# Patient Record
Sex: FEMALE | Race: WHITE | Employment: FULL TIME | URBAN - METROPOLITAN AREA
[De-identification: names, ages, dates, MRNs, and addresses within clinical notes are randomized per-mention and may not be internally consistent; named-entity substitution may affect disease eponyms.]

---

## 2017-01-06 RX ORDER — GABAPENTIN 300 MG/1
CAPSULE ORAL
Qty: 90 CAPSULE | Refills: 0 | OUTPATIENT
Start: 2017-01-06

## 2017-01-11 RX ORDER — GABAPENTIN 300 MG/1
CAPSULE ORAL
Qty: 90 CAPSULE | Refills: 0 | Status: SHIPPED | OUTPATIENT
Start: 2017-01-11 | End: 2017-02-07 | Stop reason: SDUPTHER

## 2017-01-13 RX ORDER — BUSPIRONE HYDROCHLORIDE 10 MG/1
TABLET ORAL
Qty: 30 TABLET | Refills: 0 | Status: SHIPPED | OUTPATIENT
Start: 2017-01-13 | End: 2017-02-15 | Stop reason: SDUPTHER

## 2017-01-23 RX ORDER — BUSPIRONE HYDROCHLORIDE 10 MG/1
TABLET ORAL
Qty: 30 TABLET | Refills: 0 | OUTPATIENT
Start: 2017-01-23

## 2017-02-07 RX ORDER — GABAPENTIN 300 MG/1
CAPSULE ORAL
Qty: 90 CAPSULE | Refills: 0 | Status: SHIPPED | OUTPATIENT
Start: 2017-02-07 | End: 2017-03-07 | Stop reason: SDUPTHER

## 2017-02-15 RX ORDER — BUSPIRONE HYDROCHLORIDE 10 MG/1
TABLET ORAL
Qty: 30 TABLET | Refills: 0 | Status: SHIPPED | OUTPATIENT
Start: 2017-02-15 | End: 2017-03-15

## 2017-02-17 RX ORDER — BUSPIRONE HYDROCHLORIDE 10 MG/1
TABLET ORAL
Qty: 30 TABLET | Refills: 0 | Status: SHIPPED | OUTPATIENT
Start: 2017-02-17 | End: 2017-03-03 | Stop reason: SDUPTHER

## 2017-03-03 RX ORDER — BUSPIRONE HYDROCHLORIDE 10 MG/1
TABLET ORAL
Qty: 30 TABLET | Refills: 0 | Status: SHIPPED | OUTPATIENT
Start: 2017-03-03 | End: 2017-03-14 | Stop reason: SDUPTHER

## 2017-03-07 RX ORDER — GABAPENTIN 300 MG/1
CAPSULE ORAL
Qty: 90 CAPSULE | Refills: 0 | Status: SHIPPED | OUTPATIENT
Start: 2017-03-07 | End: 2017-03-15 | Stop reason: SDUPTHER

## 2017-03-14 ENCOUNTER — HOSPITAL ENCOUNTER (OUTPATIENT)
Dept: WOMENS IMAGING | Age: 57
Discharge: HOME | End: 2017-03-14
Attending: INTERNAL MEDICINE | Admitting: INTERNAL MEDICINE

## 2017-03-14 DIAGNOSIS — Z12.31 VISIT FOR SCREENING MAMMOGRAM: ICD-10-CM

## 2017-03-14 RX ORDER — BUSPIRONE HYDROCHLORIDE 10 MG/1
TABLET ORAL
Qty: 30 TABLET | Refills: 0 | Status: SHIPPED | OUTPATIENT
Start: 2017-03-14 | End: 2017-03-22 | Stop reason: SDUPTHER

## 2017-03-15 ENCOUNTER — OFFICE VISIT (OUTPATIENT)
Dept: FAMILY MEDICINE CLINIC | Age: 57
End: 2017-03-15

## 2017-03-15 VITALS
HEIGHT: 66 IN | WEIGHT: 194 LBS | BODY MASS INDEX: 31.18 KG/M2 | TEMPERATURE: 98.1 F | SYSTOLIC BLOOD PRESSURE: 118 MMHG | DIASTOLIC BLOOD PRESSURE: 82 MMHG

## 2017-03-15 DIAGNOSIS — Z00.00 PHYSICAL EXAM, ANNUAL: Primary | ICD-10-CM

## 2017-03-15 DIAGNOSIS — Z13.220 SCREENING FOR LIPOID DISORDERS: ICD-10-CM

## 2017-03-15 DIAGNOSIS — Z13.1 SCREENING FOR DIABETES MELLITUS: ICD-10-CM

## 2017-03-15 PROCEDURE — 99396 PREV VISIT EST AGE 40-64: CPT | Performed by: INTERNAL MEDICINE

## 2017-03-15 RX ORDER — GABAPENTIN 300 MG/1
CAPSULE ORAL
Qty: 90 CAPSULE | Refills: 0 | Status: SHIPPED | OUTPATIENT
Start: 2017-03-15 | End: 2017-04-19 | Stop reason: SDUPTHER

## 2017-03-15 ASSESSMENT — ENCOUNTER SYMPTOMS
SHORTNESS OF BREATH: 0
NAUSEA: 0
APNEA: 0
SORE THROAT: 0
SINUS PRESSURE: 0
COUGH: 0
BLOOD IN STOOL: 0
WHEEZING: 0
RHINORRHEA: 0
CONSTIPATION: 0
ABDOMINAL PAIN: 0
DIARRHEA: 0
VOMITING: 0

## 2017-03-15 ASSESSMENT — PATIENT HEALTH QUESTIONNAIRE - PHQ9
SUM OF ALL RESPONSES TO PHQ QUESTIONS 1-9: 0
1. LITTLE INTEREST OR PLEASURE IN DOING THINGS: 0
2. FEELING DOWN, DEPRESSED OR HOPELESS: 0
SUM OF ALL RESPONSES TO PHQ9 QUESTIONS 1 & 2: 0

## 2017-03-22 RX ORDER — BUSPIRONE HYDROCHLORIDE 10 MG/1
TABLET ORAL
Qty: 30 TABLET | Refills: 0 | Status: SHIPPED | OUTPATIENT
Start: 2017-03-22 | End: 2017-03-29 | Stop reason: SDUPTHER

## 2017-03-27 DIAGNOSIS — Z13.1 SCREENING FOR DIABETES MELLITUS: ICD-10-CM

## 2017-03-27 DIAGNOSIS — Z13.220 SCREENING FOR LIPOID DISORDERS: ICD-10-CM

## 2017-03-27 LAB
CHOLESTEROL, TOTAL: 217 MG/DL (ref 0–199)
GLUCOSE BLD-MCNC: 102 MG/DL (ref 70–99)
HDLC SERPL-MCNC: 58 MG/DL (ref 40–60)
LDL CHOLESTEROL CALCULATED: 138 MG/DL
TRIGL SERPL-MCNC: 106 MG/DL (ref 0–150)
VLDLC SERPL CALC-MCNC: 21 MG/DL

## 2017-03-29 RX ORDER — BUSPIRONE HYDROCHLORIDE 10 MG/1
TABLET ORAL
Qty: 30 TABLET | Refills: 0 | Status: SHIPPED | OUTPATIENT
Start: 2017-03-29 | End: 2017-04-07 | Stop reason: SDUPTHER

## 2017-04-07 RX ORDER — BUSPIRONE HYDROCHLORIDE 10 MG/1
TABLET ORAL
Qty: 30 TABLET | Refills: 0 | Status: SHIPPED | OUTPATIENT
Start: 2017-04-07 | End: 2017-04-14 | Stop reason: SDUPTHER

## 2017-04-17 RX ORDER — BUSPIRONE HYDROCHLORIDE 10 MG/1
TABLET ORAL
Qty: 30 TABLET | Refills: 0 | Status: SHIPPED | OUTPATIENT
Start: 2017-04-17 | End: 2017-04-19 | Stop reason: SDUPTHER

## 2017-04-18 RX ORDER — NAPROXEN 500 MG/1
TABLET ORAL
Qty: 20 TABLET | Refills: 0 | Status: SHIPPED | OUTPATIENT
Start: 2017-04-18 | End: 2017-04-19 | Stop reason: SDUPTHER

## 2017-04-19 RX ORDER — BUSPIRONE HYDROCHLORIDE 10 MG/1
TABLET ORAL
Qty: 30 TABLET | Refills: 1 | Status: SHIPPED | OUTPATIENT
Start: 2017-04-19 | End: 2017-07-03 | Stop reason: SDUPTHER

## 2017-04-19 RX ORDER — GABAPENTIN 300 MG/1
CAPSULE ORAL
Qty: 90 CAPSULE | Refills: 0 | Status: SHIPPED | OUTPATIENT
Start: 2017-04-19 | End: 2017-06-16 | Stop reason: SDUPTHER

## 2017-04-19 RX ORDER — NAPROXEN 500 MG/1
TABLET ORAL
Qty: 20 TABLET | Refills: 0 | Status: SHIPPED | OUTPATIENT
Start: 2017-04-19 | End: 2017-09-11 | Stop reason: SDUPTHER

## 2017-06-16 RX ORDER — GABAPENTIN 300 MG/1
CAPSULE ORAL
Qty: 90 CAPSULE | Refills: 0 | Status: SHIPPED | OUTPATIENT
Start: 2017-06-16 | End: 2017-07-28 | Stop reason: SDUPTHER

## 2017-06-27 ENCOUNTER — OFFICE VISIT (OUTPATIENT)
Dept: FAMILY MEDICINE CLINIC | Age: 57
End: 2017-06-27

## 2017-06-27 VITALS
BODY MASS INDEX: 30.89 KG/M2 | SYSTOLIC BLOOD PRESSURE: 134 MMHG | WEIGHT: 192.2 LBS | DIASTOLIC BLOOD PRESSURE: 80 MMHG | HEIGHT: 66 IN | TEMPERATURE: 98.1 F

## 2017-06-27 DIAGNOSIS — J01.10 ACUTE NON-RECURRENT FRONTAL SINUSITIS: Primary | ICD-10-CM

## 2017-06-27 PROCEDURE — 99213 OFFICE O/P EST LOW 20 MIN: CPT | Performed by: INTERNAL MEDICINE

## 2017-06-27 RX ORDER — CIPROFLOXACIN 500 MG/1
500 TABLET, FILM COATED ORAL 2 TIMES DAILY
Qty: 20 TABLET | Refills: 0 | Status: SHIPPED | OUTPATIENT
Start: 2017-06-27 | End: 2017-07-07

## 2017-06-27 ASSESSMENT — ENCOUNTER SYMPTOMS
SHORTNESS OF BREATH: 0
APNEA: 0
ABDOMINAL PAIN: 0
COUGH: 1

## 2017-07-03 RX ORDER — BUSPIRONE HYDROCHLORIDE 10 MG/1
TABLET ORAL
Qty: 30 TABLET | Refills: 0 | Status: SHIPPED | OUTPATIENT
Start: 2017-07-03 | End: 2017-07-12 | Stop reason: SDUPTHER

## 2017-07-12 RX ORDER — BUSPIRONE HYDROCHLORIDE 10 MG/1
TABLET ORAL
Qty: 30 TABLET | Refills: 0 | Status: SHIPPED | OUTPATIENT
Start: 2017-07-12 | End: 2017-07-19 | Stop reason: SDUPTHER

## 2017-07-19 RX ORDER — BUSPIRONE HYDROCHLORIDE 10 MG/1
TABLET ORAL
Qty: 30 TABLET | Refills: 0 | Status: SHIPPED | OUTPATIENT
Start: 2017-07-19 | End: 2017-07-28 | Stop reason: SDUPTHER

## 2017-07-24 RX ORDER — GABAPENTIN 300 MG/1
CAPSULE ORAL
Qty: 90 CAPSULE | Status: CANCELLED | OUTPATIENT
Start: 2017-07-24

## 2017-07-28 ENCOUNTER — TELEPHONE (OUTPATIENT)
Dept: FAMILY MEDICINE CLINIC | Age: 57
End: 2017-07-28

## 2017-07-28 RX ORDER — BUSPIRONE HYDROCHLORIDE 10 MG/1
TABLET ORAL
Qty: 30 TABLET | Refills: 0 | Status: SHIPPED | OUTPATIENT
Start: 2017-07-28 | End: 2017-08-03 | Stop reason: SDUPTHER

## 2017-07-28 RX ORDER — GABAPENTIN 300 MG/1
CAPSULE ORAL
Qty: 90 CAPSULE | Refills: 1 | Status: SHIPPED | OUTPATIENT
Start: 2017-07-28 | End: 2017-09-22 | Stop reason: SDUPTHER

## 2017-08-04 RX ORDER — BUSPIRONE HYDROCHLORIDE 10 MG/1
TABLET ORAL
Qty: 30 TABLET | Refills: 0 | Status: SHIPPED | OUTPATIENT
Start: 2017-08-04 | End: 2017-08-11 | Stop reason: SDUPTHER

## 2017-08-11 RX ORDER — BUSPIRONE HYDROCHLORIDE 10 MG/1
TABLET ORAL
Qty: 30 TABLET | Refills: 0 | Status: SHIPPED | OUTPATIENT
Start: 2017-08-11 | End: 2017-08-21 | Stop reason: SDUPTHER

## 2017-08-21 RX ORDER — BUSPIRONE HYDROCHLORIDE 10 MG/1
TABLET ORAL
Qty: 30 TABLET | Refills: 0 | Status: SHIPPED | OUTPATIENT
Start: 2017-08-21 | End: 2017-08-29 | Stop reason: SDUPTHER

## 2017-08-29 RX ORDER — BUSPIRONE HYDROCHLORIDE 10 MG/1
TABLET ORAL
Qty: 30 TABLET | Refills: 0 | Status: SHIPPED | OUTPATIENT
Start: 2017-08-29 | End: 2017-09-11 | Stop reason: SDUPTHER

## 2017-09-11 RX ORDER — BUSPIRONE HYDROCHLORIDE 10 MG/1
TABLET ORAL
Qty: 30 TABLET | Refills: 0 | Status: SHIPPED | OUTPATIENT
Start: 2017-09-11 | End: 2018-03-07 | Stop reason: ALTCHOICE

## 2017-09-11 RX ORDER — BUSPIRONE HYDROCHLORIDE 10 MG/1
TABLET ORAL
Qty: 30 TABLET | Refills: 3 | Status: SHIPPED | OUTPATIENT
Start: 2017-09-11 | End: 2017-10-20 | Stop reason: SDUPTHER

## 2017-09-12 RX ORDER — NAPROXEN 500 MG/1
TABLET ORAL
Qty: 20 TABLET | Refills: 0 | Status: SHIPPED | OUTPATIENT
Start: 2017-09-12 | End: 2018-03-07 | Stop reason: SDUPTHER

## 2017-09-22 RX ORDER — GABAPENTIN 300 MG/1
CAPSULE ORAL
Qty: 90 CAPSULE | Refills: 0 | Status: SHIPPED | OUTPATIENT
Start: 2017-09-22 | End: 2019-12-20 | Stop reason: SDUPTHER

## 2017-09-25 RX ORDER — GABAPENTIN 300 MG/1
CAPSULE ORAL
Qty: 90 CAPSULE | Refills: 0 | Status: SHIPPED | OUTPATIENT
Start: 2017-09-25 | End: 2017-11-21 | Stop reason: SDUPTHER

## 2017-10-04 ENCOUNTER — TELEPHONE (OUTPATIENT)
Dept: FAMILY MEDICINE CLINIC | Age: 57
End: 2017-10-04

## 2017-10-04 RX ORDER — PERMETHRIN 50 MG/G
CREAM TOPICAL
Qty: 1 TUBE | Refills: 0 | Status: SHIPPED | OUTPATIENT
Start: 2017-10-04 | End: 2018-03-07 | Stop reason: ALTCHOICE

## 2017-10-04 NOTE — TELEPHONE ENCOUNTER
Pt knows for sure it is scabies, exposed at work 2 weeks ago and work is being treated. Pt stated that she has a small batch on her back, it itches and burns and getting intense.     Pl advise   [phone]

## 2017-10-20 RX ORDER — BUSPIRONE HYDROCHLORIDE 10 MG/1
TABLET ORAL
Qty: 30 TABLET | Refills: 1 | Status: SHIPPED | OUTPATIENT
Start: 2017-10-20 | End: 2017-11-08 | Stop reason: SDUPTHER

## 2017-10-20 RX ORDER — NAPROXEN 500 MG/1
TABLET ORAL
Qty: 60 TABLET | Refills: 1 | Status: SHIPPED | OUTPATIENT
Start: 2017-10-20 | End: 2018-03-07 | Stop reason: SDUPTHER

## 2017-11-08 RX ORDER — BUSPIRONE HYDROCHLORIDE 10 MG/1
TABLET ORAL
Qty: 30 TABLET | Refills: 0 | Status: SHIPPED | OUTPATIENT
Start: 2017-11-08 | End: 2017-11-18 | Stop reason: SDUPTHER

## 2017-11-20 RX ORDER — BUSPIRONE HYDROCHLORIDE 10 MG/1
TABLET ORAL
Qty: 30 TABLET | Refills: 0 | Status: SHIPPED | OUTPATIENT
Start: 2017-11-20 | End: 2017-11-27 | Stop reason: SDUPTHER

## 2017-11-21 RX ORDER — GABAPENTIN 300 MG/1
CAPSULE ORAL
Qty: 90 CAPSULE | Refills: 0 | Status: SHIPPED | OUTPATIENT
Start: 2017-11-21 | End: 2017-12-21 | Stop reason: SDUPTHER

## 2017-11-27 RX ORDER — BUSPIRONE HYDROCHLORIDE 10 MG/1
TABLET ORAL
Qty: 30 TABLET | Refills: 0 | Status: SHIPPED | OUTPATIENT
Start: 2017-11-27 | End: 2018-03-07 | Stop reason: ALTCHOICE

## 2017-12-22 RX ORDER — GABAPENTIN 300 MG/1
CAPSULE ORAL
Qty: 90 CAPSULE | Refills: 0 | Status: SHIPPED | OUTPATIENT
Start: 2017-12-22 | End: 2018-01-19 | Stop reason: SDUPTHER

## 2018-01-19 RX ORDER — GABAPENTIN 300 MG/1
CAPSULE ORAL
Qty: 90 CAPSULE | Refills: 0 | Status: SHIPPED | OUTPATIENT
Start: 2018-01-19 | End: 2018-02-17 | Stop reason: SDUPTHER

## 2018-02-19 RX ORDER — GABAPENTIN 300 MG/1
CAPSULE ORAL
Qty: 90 CAPSULE | Refills: 0 | Status: SHIPPED | OUTPATIENT
Start: 2018-02-19 | End: 2018-03-07 | Stop reason: SDUPTHER

## 2018-03-07 ENCOUNTER — OFFICE VISIT (OUTPATIENT)
Dept: FAMILY MEDICINE CLINIC | Age: 58
End: 2018-03-07

## 2018-03-07 ENCOUNTER — HOSPITAL ENCOUNTER (OUTPATIENT)
Dept: NON INVASIVE DIAGNOSTICS | Age: 58
Discharge: HOME | End: 2018-03-07
Attending: INTERNAL MEDICINE | Admitting: INTERNAL MEDICINE

## 2018-03-07 VITALS
DIASTOLIC BLOOD PRESSURE: 74 MMHG | BODY MASS INDEX: 29.92 KG/M2 | SYSTOLIC BLOOD PRESSURE: 128 MMHG | HEIGHT: 66 IN | TEMPERATURE: 98.2 F | WEIGHT: 186.2 LBS

## 2018-03-07 DIAGNOSIS — M79.644 PAIN OF RIGHT THUMB: ICD-10-CM

## 2018-03-07 DIAGNOSIS — H10.021 PINK EYE DISEASE OF RIGHT EYE: ICD-10-CM

## 2018-03-07 PROCEDURE — 1036F TOBACCO NON-USER: CPT | Performed by: INTERNAL MEDICINE

## 2018-03-07 PROCEDURE — G8427 DOCREV CUR MEDS BY ELIG CLIN: HCPCS | Performed by: INTERNAL MEDICINE

## 2018-03-07 PROCEDURE — 3014F SCREEN MAMMO DOC REV: CPT | Performed by: INTERNAL MEDICINE

## 2018-03-07 PROCEDURE — G8484 FLU IMMUNIZE NO ADMIN: HCPCS | Performed by: INTERNAL MEDICINE

## 2018-03-07 PROCEDURE — G8417 CALC BMI ABV UP PARAM F/U: HCPCS | Performed by: INTERNAL MEDICINE

## 2018-03-07 PROCEDURE — 99213 OFFICE O/P EST LOW 20 MIN: CPT | Performed by: INTERNAL MEDICINE

## 2018-03-07 PROCEDURE — 3017F COLORECTAL CA SCREEN DOC REV: CPT | Performed by: INTERNAL MEDICINE

## 2018-03-07 RX ORDER — NAPROXEN 500 MG/1
TABLET ORAL
Qty: 20 TABLET | Refills: 3 | Status: SHIPPED | OUTPATIENT
Start: 2018-03-07 | End: 2018-12-18 | Stop reason: ALTCHOICE

## 2018-03-07 RX ORDER — MOXIFLOXACIN 5 MG/ML
1 SOLUTION OPHTHALMIC 2 TIMES DAILY
Qty: 3 ML | Refills: 0 | Status: SHIPPED | OUTPATIENT
Start: 2018-03-07 | End: 2018-12-18 | Stop reason: ALTCHOICE

## 2018-03-07 ASSESSMENT — ENCOUNTER SYMPTOMS
SINUS PAIN: 0
RHINORRHEA: 0
CONSTIPATION: 0
BLOOD IN STOOL: 0
SHORTNESS OF BREATH: 0
COUGH: 0

## 2018-03-07 NOTE — PROGRESS NOTES
MG tablet Take 25 mg by mouth nightly as needed. No current facility-administered medications for this visit. Allergies: Dye [iodides]; Cephalexin; Clindamycin/lincomycin cross reactors; Erythromycin; Nortriptyline; Paxil [paroxetine]; Pcn [penicillins]; Sertraline; Sulfa antibiotics; Tetracyclines & related; and Amoxicillin-pot clavulanate  Past Medical History:   Diagnosis Date    Allergic rhinitis     Anxiety disorder     Depression     HSV (herpes simplex virus) infection     IBS (irritable bowel syndrome)     SVT (supraventricular tachycardia) (HCC)     Stewart-Parkinson-White syndrome      Past Surgical History:   Procedure Laterality Date    APPENDECTOMY  07/05/1999    CHOLECYSTECTOMY      INCISIONAL HERNIA REPAIR  6-8-16    with mesh    PARTIAL HYSTERECTOMY  07/16/1999    TONSILLECTOMY       Family History   Problem Relation Age of Onset    Heart Disease Father     Diabetes Father     Cancer Mother      uterine    Kidney Disease Paternal Aunt      Social History   Substance Use Topics    Smoking status: Former Smoker     Quit date: 6/20/2008    Smokeless tobacco: Never Used    Alcohol use Yes      Comment: social use       Review of Systems   Constitutional: Negative for chills, diaphoresis and fatigue. HENT: Negative for congestion, postnasal drip, rhinorrhea and sinus pain. Eyes:        Patient presents with:  Conjunctivitis: patient c/o ? conjunctivitis- right eye x 3-4 days; redness, matting this morning and drainage  Finger Pain: patient c/o right thumb pain; ? arthritis. patient request Rx refill: Naprosyn     Respiratory: Negative for cough and shortness of breath. Cardiovascular: Negative for palpitations. Gastrointestinal: Negative for blood in stool and constipation. Genitourinary: Negative for dysuria and frequency.    Musculoskeletal:        Patient presents with:  Conjunctivitis: patient c/o ? conjunctivitis- right eye x 3-4 days; redness, matting this morning and drainage  Finger Pain: patient c/o right thumb pain; ? arthritis. patient request Rx refill: Naprosyn         Objective:   Physical Exam   Constitutional: She is oriented to person, place, and time. She appears well-developed and well-nourished. HENT:   Head: Normocephalic and atraumatic. Eyes:   [place] R   Cardiovascular: Normal rate. No murmur heard. Pulmonary/Chest: Effort normal and breath sounds normal. No respiratory distress. She has no wheezes. She has no rales. Abdominal: She exhibits no distension. There is no tenderness. There is no rebound. Neurological: She is alert and oriented to person, place, and time. Assessment:      1. Pain of right thumb  XR HAND RIGHT (2 VIEWS)   2. Pink eye disease of right eye           Plan:      Outpatient Encounter Prescriptions as of 3/7/2018   Medication Sig Dispense Refill    Moxifloxacin HCl (MOXEZA) 0.5 % SOLN Apply 1 drop to eye 2 times daily 3 mL 0    naproxen (NAPROSYN) 500 MG tablet TAKE ONE TABLET BY MOUTH TWICE A DAY WITH MEALS 20 tablet 3    gabapentin (NEURONTIN) 300 MG capsule TAKE ONE CAPSULE BY MOUTH THREE TIMES A DAY 90 capsule 0    omeprazole (PRILOSEC) 20 MG capsule Take 20 mg by mouth daily as needed       senna (SENOKOT) 8.6 MG tablet Take 1 tablet by mouth daily as needed       loratadine (CLARITIN) 10 MG tablet Take 10 mg by mouth daily as needed.  diphenhydrAMINE (BENADRYL) 25 MG tablet Take 25 mg by mouth nightly as needed.       [DISCONTINUED] gabapentin (NEURONTIN) 300 MG capsule TAKE ONE CAPSULE BY MOUTH THREE TIMES A DAY 90 capsule 0    [DISCONTINUED] busPIRone (BUSPAR) 10 MG tablet TAKE ONE TABLET BY MOUTH THREE TIMES A DAY 30 tablet 0    [DISCONTINUED] naproxen (NAPROSYN) 500 MG tablet TAKE ONE TABLET BY MOUTH TWICE A DAY WITH MEALS 60 tablet 1    [DISCONTINUED] permethrin (ELIMITE) 5 % cream Apply topically as directed 1 Tube 0    [DISCONTINUED] naproxen (NAPROSYN) 500 MG tablet TAKE ONE TABLET BY MOUTH TWICE A DAY WITH MEALS 20 tablet 0    [DISCONTINUED] busPIRone (BUSPAR) 10 MG tablet TAKE ONE TABLET BY MOUTH THREE TIMES A DAY 30 tablet 0     No facility-administered encounter medications on file as of 3/7/2018.       Orders Placed This Encounter   Procedures    XR HAND RIGHT (2 VIEWS)     Standing Status:   Future     Standing Expiration Date:   3/7/2019     Order Specific Question:   Reason for exam:     Answer:   pain base of R thumb

## 2018-03-19 RX ORDER — GABAPENTIN 300 MG/1
CAPSULE ORAL
Qty: 90 CAPSULE | Refills: 0 | Status: SHIPPED | OUTPATIENT
Start: 2018-03-19 | End: 2018-04-16 | Stop reason: SDUPTHER

## 2018-03-26 ENCOUNTER — HOSPITAL ENCOUNTER (OUTPATIENT)
Dept: WOMENS IMAGING | Age: 58
Discharge: HOME | End: 2018-03-26
Admitting: INTERNAL MEDICINE

## 2018-03-26 DIAGNOSIS — Z12.31 VISIT FOR SCREENING MAMMOGRAM: ICD-10-CM

## 2018-04-16 RX ORDER — GABAPENTIN 300 MG/1
CAPSULE ORAL
Qty: 90 CAPSULE | Refills: 0 | Status: SHIPPED | OUTPATIENT
Start: 2018-04-16 | End: 2018-04-27 | Stop reason: SDUPTHER

## 2018-04-27 ENCOUNTER — OFFICE VISIT (OUTPATIENT)
Dept: FAMILY MEDICINE CLINIC | Age: 58
End: 2018-04-27

## 2018-04-27 VITALS
SYSTOLIC BLOOD PRESSURE: 120 MMHG | HEIGHT: 66 IN | BODY MASS INDEX: 30.12 KG/M2 | TEMPERATURE: 98.1 F | DIASTOLIC BLOOD PRESSURE: 74 MMHG | WEIGHT: 187.4 LBS

## 2018-04-27 DIAGNOSIS — M79.644 PAIN OF RIGHT THUMB: Primary | ICD-10-CM

## 2018-04-27 PROCEDURE — 99213 OFFICE O/P EST LOW 20 MIN: CPT | Performed by: INTERNAL MEDICINE

## 2018-04-27 RX ORDER — METHYLPREDNISOLONE 4 MG/1
TABLET ORAL
Qty: 1 KIT | Refills: 0 | Status: SHIPPED | OUTPATIENT
Start: 2018-04-27 | End: 2018-12-18 | Stop reason: ALTCHOICE

## 2018-04-27 ASSESSMENT — ENCOUNTER SYMPTOMS
ABDOMINAL PAIN: 0
APNEA: 0
COUGH: 0

## 2018-05-07 ENCOUNTER — OFFICE VISIT (OUTPATIENT)
Dept: ORTHOPEDIC SURGERY | Age: 58
End: 2018-05-07

## 2018-05-07 ENCOUNTER — PRE-EVALUATION (OUTPATIENT)
Dept: ORTHOPEDIC SURGERY | Age: 58
End: 2018-05-07

## 2018-05-07 VITALS
WEIGHT: 187 LBS | SYSTOLIC BLOOD PRESSURE: 119 MMHG | HEIGHT: 66 IN | RESPIRATION RATE: 16 BRPM | HEART RATE: 91 BPM | DIASTOLIC BLOOD PRESSURE: 73 MMHG | BODY MASS INDEX: 30.05 KG/M2

## 2018-05-07 DIAGNOSIS — M65.321 TRIGGER INDEX FINGER OF RIGHT HAND: ICD-10-CM

## 2018-05-07 DIAGNOSIS — M18.11 PRIMARY OSTEOARTHRITIS OF FIRST CARPOMETACARPAL JOINT OF RIGHT HAND: Primary | ICD-10-CM

## 2018-05-07 PROCEDURE — 99243 OFF/OP CNSLTJ NEW/EST LOW 30: CPT | Performed by: ORTHOPAEDIC SURGERY

## 2018-05-07 PROCEDURE — APPNB30 APP NON BILLABLE TIME 0-30 MINS: Performed by: PHYSICIAN ASSISTANT

## 2018-05-07 PROCEDURE — L3924 HFO WITHOUT JOINTS PRE OTS: HCPCS | Performed by: ORTHOPAEDIC SURGERY

## 2018-05-14 RX ORDER — GABAPENTIN 300 MG/1
CAPSULE ORAL
Qty: 90 CAPSULE | Refills: 0 | Status: SHIPPED | OUTPATIENT
Start: 2018-05-14 | End: 2018-06-12 | Stop reason: SDUPTHER

## 2018-06-13 RX ORDER — GABAPENTIN 300 MG/1
CAPSULE ORAL
Qty: 90 CAPSULE | Refills: 0 | Status: SHIPPED | OUTPATIENT
Start: 2018-06-13 | End: 2018-07-11 | Stop reason: SDUPTHER

## 2018-07-11 RX ORDER — GABAPENTIN 300 MG/1
CAPSULE ORAL
Qty: 90 CAPSULE | Refills: 0 | Status: SHIPPED | OUTPATIENT
Start: 2018-07-11 | End: 2018-08-09 | Stop reason: SDUPTHER

## 2018-08-10 RX ORDER — GABAPENTIN 300 MG/1
CAPSULE ORAL
Qty: 90 CAPSULE | Refills: 0 | Status: SHIPPED | OUTPATIENT
Start: 2018-08-10 | End: 2018-09-08 | Stop reason: SDUPTHER

## 2018-09-10 RX ORDER — GABAPENTIN 300 MG/1
CAPSULE ORAL
Qty: 90 CAPSULE | Refills: 0 | Status: SHIPPED | OUTPATIENT
Start: 2018-09-10 | End: 2018-10-07 | Stop reason: SDUPTHER

## 2018-09-26 PROBLEM — Z13.1 SCREENING FOR DIABETES MELLITUS: Status: RESOLVED | Noted: 2017-03-15 | Resolved: 2018-09-26

## 2018-09-26 PROBLEM — Z13.220 ENCOUNTER FOR SCREENING FOR LIPOID DISORDERS: Status: RESOLVED | Noted: 2017-03-15 | Resolved: 2018-09-26

## 2018-09-26 PROBLEM — Z13.220 SCREENING FOR LIPOID DISORDERS: Status: RESOLVED | Noted: 2017-03-15 | Resolved: 2018-09-26

## 2018-09-26 PROBLEM — Z00.00 PHYSICAL EXAM, ANNUAL: Status: RESOLVED | Noted: 2017-03-15 | Resolved: 2018-09-26

## 2018-10-08 RX ORDER — GABAPENTIN 300 MG/1
CAPSULE ORAL
Qty: 90 CAPSULE | Refills: 0 | Status: SHIPPED | OUTPATIENT
Start: 2018-10-08 | End: 2018-11-09 | Stop reason: SDUPTHER

## 2018-11-09 RX ORDER — GABAPENTIN 300 MG/1
CAPSULE ORAL
Qty: 90 CAPSULE | Refills: 0 | Status: SHIPPED | OUTPATIENT
Start: 2018-11-09 | End: 2018-12-10 | Stop reason: SDUPTHER

## 2018-12-10 RX ORDER — GABAPENTIN 300 MG/1
CAPSULE ORAL
Qty: 90 CAPSULE | Refills: 0 | Status: SHIPPED | OUTPATIENT
Start: 2018-12-10 | End: 2018-12-18 | Stop reason: SDUPTHER

## 2018-12-18 ENCOUNTER — OFFICE VISIT (OUTPATIENT)
Dept: FAMILY MEDICINE CLINIC | Age: 58
End: 2018-12-18
Payer: COMMERCIAL

## 2018-12-18 VITALS
DIASTOLIC BLOOD PRESSURE: 70 MMHG | BODY MASS INDEX: 29.51 KG/M2 | SYSTOLIC BLOOD PRESSURE: 122 MMHG | HEIGHT: 66 IN | WEIGHT: 183.6 LBS

## 2018-12-18 DIAGNOSIS — L98.9 SKIN LESION OF FACE: Primary | ICD-10-CM

## 2018-12-18 PROCEDURE — 99213 OFFICE O/P EST LOW 20 MIN: CPT | Performed by: INTERNAL MEDICINE

## 2018-12-18 ASSESSMENT — PATIENT HEALTH QUESTIONNAIRE - PHQ9
SUM OF ALL RESPONSES TO PHQ QUESTIONS 1-9: 0
1. LITTLE INTEREST OR PLEASURE IN DOING THINGS: 0
SUM OF ALL RESPONSES TO PHQ QUESTIONS 1-9: 0
2. FEELING DOWN, DEPRESSED OR HOPELESS: 0
SUM OF ALL RESPONSES TO PHQ9 QUESTIONS 1 & 2: 0

## 2018-12-18 ASSESSMENT — ENCOUNTER SYMPTOMS
COUGH: 0
APNEA: 0
RHINORRHEA: 0
ABDOMINAL PAIN: 0
SHORTNESS OF BREATH: 0
CONSTIPATION: 0

## 2019-01-07 RX ORDER — GABAPENTIN 300 MG/1
CAPSULE ORAL
Qty: 90 CAPSULE | Refills: 0 | Status: SHIPPED | OUTPATIENT
Start: 2019-01-07 | End: 2019-02-04 | Stop reason: SDUPTHER

## 2019-02-04 RX ORDER — GABAPENTIN 300 MG/1
CAPSULE ORAL
Qty: 90 CAPSULE | Refills: 0 | Status: SHIPPED | OUTPATIENT
Start: 2019-02-04 | End: 2019-03-04 | Stop reason: SDUPTHER

## 2019-03-04 RX ORDER — GABAPENTIN 300 MG/1
CAPSULE ORAL
Qty: 90 CAPSULE | Refills: 0 | Status: SHIPPED | OUTPATIENT
Start: 2019-03-04 | End: 2019-04-02 | Stop reason: SDUPTHER

## 2019-04-02 RX ORDER — GABAPENTIN 300 MG/1
CAPSULE ORAL
Qty: 90 CAPSULE | Refills: 0 | Status: SHIPPED | OUTPATIENT
Start: 2019-04-02 | End: 2019-04-17 | Stop reason: SDUPTHER

## 2019-04-17 ENCOUNTER — OFFICE VISIT (OUTPATIENT)
Dept: FAMILY MEDICINE CLINIC | Age: 59
End: 2019-04-17
Payer: COMMERCIAL

## 2019-04-17 VITALS
WEIGHT: 189.2 LBS | TEMPERATURE: 98.5 F | HEIGHT: 66 IN | BODY MASS INDEX: 30.41 KG/M2 | DIASTOLIC BLOOD PRESSURE: 76 MMHG | SYSTOLIC BLOOD PRESSURE: 120 MMHG

## 2019-04-17 DIAGNOSIS — J04.0 LARYNGITIS: Primary | ICD-10-CM

## 2019-04-17 PROCEDURE — 99213 OFFICE O/P EST LOW 20 MIN: CPT | Performed by: INTERNAL MEDICINE

## 2019-04-17 ASSESSMENT — ENCOUNTER SYMPTOMS
APNEA: 0
ABDOMINAL PAIN: 0
SHORTNESS OF BREATH: 0
COUGH: 0

## 2019-04-17 NOTE — PROGRESS NOTES
Subjective:      Patient ID: Leonarda Collet is a 66 y.o. female. HPI   Chief Complaint   Patient presents with    Other     patient c/o headache since yesterday, laryngitis and non-productive cough this morning, facial swelling, \"knot on jawline\". patient denies sore throat, fever     Leonarda Collet is a 66 y.o. female with the following history as recorded in Epic[employer]:  Patient Active Problem List    Diagnosis Date Noted    Trigger index finger of right hand 05/07/2018    Primary osteoarthritis of first carpometacarpal joint of right hand 05/07/2018    Pain of right thumb 03/07/2018    Pink eye disease of right eye 03/07/2018    Incisional hernia, without obstruction or gangrene     Neck pain 05/23/2016    Abdominal wall pain 05/23/2016    Skull mass 02/13/2015    Skin lesion of face 12/12/2014    Skin lesion 12/12/2014    Trigeminal neuralgia 11/11/2014    Change in vision 10/14/2014    Pharyngitis 07/01/2014    Left foot pain 07/01/2014    Chills 07/01/2014    GERD (gastroesophageal reflux disease) 04/01/2014    Abdominal wall pain in epigastric region 04/01/2014    Nausea 04/01/2014    IBS (irritable bowel syndrome)     Anxiety disorder     Depression     Allergic rhinitis     HSV (herpes simplex virus) infection     SVT (supraventricular tachycardia) ([facility])      Current Outpatient Medications   Medication Sig Dispense Refill    gabapentin (NEURONTIN) 300 MG capsule TAKE ONE CAPSULE BY MOUTH THREE TIMES A DAY 90 capsule 0    omeprazole (PRILOSEC) 20 MG capsule Take 20 mg by mouth daily as needed       senna (SENOKOT) 8.6 MG tablet Take 1 tablet by mouth daily as needed       loratadine (CLARITIN) 10 MG tablet Take 10 mg by mouth daily as needed.  diphenhydrAMINE (BENADRYL) 25 MG tablet Take 25 mg by mouth nightly as needed. No current facility-administered medications for this visit. Allergies: Dye [iodides];  Cephalexin; Clindamycin/lincomycin cross reactors; heart sounds. Pulmonary/Chest: No respiratory distress. Abdominal: She exhibits no distension. There is no guarding. Assessment:       Diagnosis Orders   1.  Laryngitis           Plan:      observation        [facility], DO

## 2019-04-18 ENCOUNTER — HOSPITAL ENCOUNTER (OUTPATIENT)
Dept: WOMENS IMAGING | Age: 59
Discharge: HOME | End: 2019-04-18
Payer: COMMERCIAL

## 2019-04-18 DIAGNOSIS — Z12.39 BREAST CANCER SCREENING: ICD-10-CM

## 2019-04-18 PROCEDURE — 77067 SCR MAMMO BI INCL CAD: CPT

## 2019-04-19 RX ORDER — NAPROXEN 500 MG/1
TABLET ORAL
Qty: 60 TABLET | Refills: 0 | Status: SHIPPED | OUTPATIENT
Start: 2019-04-19 | End: 2020-04-14

## 2019-04-30 RX ORDER — GABAPENTIN 300 MG/1
CAPSULE ORAL
Qty: 90 CAPSULE | Refills: 0 | Status: SHIPPED | OUTPATIENT
Start: 2019-04-30 | End: 2019-05-28 | Stop reason: SDUPTHER

## 2019-05-28 RX ORDER — GABAPENTIN 300 MG/1
CAPSULE ORAL
Qty: 90 CAPSULE | Refills: 0 | Status: SHIPPED | OUTPATIENT
Start: 2019-05-28 | End: 2019-06-24 | Stop reason: SDUPTHER

## 2019-06-24 RX ORDER — GABAPENTIN 300 MG/1
CAPSULE ORAL
Qty: 90 CAPSULE | Refills: 0 | Status: SHIPPED | OUTPATIENT
Start: 2019-06-24 | End: 2019-07-22 | Stop reason: SDUPTHER

## 2019-06-28 RX ORDER — NAPROXEN 500 MG/1
TABLET ORAL
Qty: 60 TABLET | Refills: 0 | Status: SHIPPED | OUTPATIENT
Start: 2019-06-28 | End: 2020-02-24

## 2019-07-22 RX ORDER — GABAPENTIN 300 MG/1
CAPSULE ORAL
Qty: 90 CAPSULE | Refills: 0 | Status: SHIPPED | OUTPATIENT
Start: 2019-07-22 | End: 2019-08-19 | Stop reason: SDUPTHER

## 2019-08-19 RX ORDER — GABAPENTIN 300 MG/1
CAPSULE ORAL
Qty: 90 CAPSULE | Refills: 0 | Status: SHIPPED | OUTPATIENT
Start: 2019-08-19 | End: 2019-09-16 | Stop reason: SDUPTHER

## 2019-09-16 RX ORDER — GABAPENTIN 300 MG/1
CAPSULE ORAL
Qty: 90 CAPSULE | Refills: 0 | Status: SHIPPED | OUTPATIENT
Start: 2019-09-16 | End: 2019-10-14 | Stop reason: SDUPTHER

## 2019-10-14 RX ORDER — GABAPENTIN 300 MG/1
CAPSULE ORAL
Qty: 90 CAPSULE | Refills: 0 | Status: SHIPPED | OUTPATIENT
Start: 2019-10-14 | End: 2019-12-20 | Stop reason: SDUPTHER

## 2019-11-11 RX ORDER — GABAPENTIN 300 MG/1
CAPSULE ORAL
Qty: 90 CAPSULE | Refills: 0 | OUTPATIENT
Start: 2019-11-11 | End: 2019-12-11

## 2019-12-20 ENCOUNTER — OFFICE VISIT (OUTPATIENT)
Dept: FAMILY MEDICINE CLINIC | Age: 59
End: 2019-12-20
Payer: COMMERCIAL

## 2019-12-20 VITALS
BODY MASS INDEX: 26.97 KG/M2 | DIASTOLIC BLOOD PRESSURE: 74 MMHG | HEIGHT: 66 IN | WEIGHT: 167.8 LBS | TEMPERATURE: 97.8 F | SYSTOLIC BLOOD PRESSURE: 126 MMHG

## 2019-12-20 DIAGNOSIS — K21.9 GASTROESOPHAGEAL REFLUX DISEASE WITHOUT ESOPHAGITIS: ICD-10-CM

## 2019-12-20 DIAGNOSIS — R10.31 RIGHT LOWER QUADRANT ABDOMINAL PAIN: ICD-10-CM

## 2019-12-20 DIAGNOSIS — F41.1 GENERALIZED ANXIETY DISORDER: Primary | ICD-10-CM

## 2019-12-20 PROCEDURE — 99214 OFFICE O/P EST MOD 30 MIN: CPT | Performed by: INTERNAL MEDICINE

## 2019-12-20 RX ORDER — BUSPIRONE HYDROCHLORIDE 10 MG/1
TABLET ORAL
Qty: 90 TABLET | Refills: 3 | Status: SHIPPED | OUTPATIENT
Start: 2019-12-20 | End: 2020-04-14

## 2019-12-20 RX ORDER — GABAPENTIN 300 MG/1
CAPSULE ORAL
Qty: 90 CAPSULE | Refills: 0 | Status: SHIPPED | OUTPATIENT
Start: 2019-12-20 | End: 2020-01-15

## 2019-12-20 ASSESSMENT — ENCOUNTER SYMPTOMS
BLOOD IN STOOL: 0
SHORTNESS OF BREATH: 0
RHINORRHEA: 0
COUGH: 0
WHEEZING: 0
ABDOMINAL PAIN: 0
APNEA: 0

## 2019-12-26 ENCOUNTER — HOSPITAL ENCOUNTER (OUTPATIENT)
Dept: CT IMAGING | Age: 59
Discharge: HOME | End: 2019-12-26
Payer: COMMERCIAL

## 2019-12-26 DIAGNOSIS — R10.31 RIGHT LOWER QUADRANT ABDOMINAL PAIN: ICD-10-CM

## 2019-12-26 PROCEDURE — 6360000004 HC RX CONTRAST MEDICATION: Performed by: INTERNAL MEDICINE

## 2019-12-26 PROCEDURE — 74176 CT ABD & PELVIS W/O CONTRAST: CPT

## 2019-12-26 RX ADMIN — IOHEXOL 50 ML: 240 INJECTION, SOLUTION INTRATHECAL; INTRAVASCULAR; INTRAVENOUS; ORAL at 06:17

## 2019-12-30 ENCOUNTER — TELEPHONE (OUTPATIENT)
Dept: FAMILY MEDICINE CLINIC | Age: 59
End: 2019-12-30

## 2020-01-15 RX ORDER — GABAPENTIN 300 MG/1
CAPSULE ORAL
Qty: 90 CAPSULE | Refills: 1 | Status: SHIPPED | OUTPATIENT
Start: 2020-01-15 | End: 2020-03-13

## 2020-02-24 RX ORDER — NAPROXEN 500 MG/1
TABLET ORAL
Qty: 60 TABLET | Refills: 0 | Status: SHIPPED | OUTPATIENT
Start: 2020-02-24 | End: 2020-02-27

## 2020-02-25 ENCOUNTER — TELEPHONE (OUTPATIENT)
Dept: FAMILY MEDICINE CLINIC | Age: 60
End: 2020-02-25

## 2020-02-25 NOTE — TELEPHONE ENCOUNTER
Pt is getting a colonoscopy done on March 6 and pt would like to know if/when  she should stop taking the Gabapentin and Naproxen prior to the colonoscopy? Pl advise.    [phone]

## 2020-02-27 RX ORDER — M-VIT,TX,IRON,MINS/CALC/FOLIC 27MG-0.4MG
1 TABLET ORAL DAILY
COMMUNITY

## 2020-02-27 NOTE — PROGRESS NOTES
[address] [facility] time____0730________        Surgery time__0830__________    Take the following medications with a sip of water:    Do not eat or drink anything after 12:00 midnight prior to your surgery. EXCEPT PREP  This includes water chewing gum, mints and ice chips. You may brush your teeth and gargle the morning of your surgery, but do not swallow the water     You may be asked to stop blood thinners such as Coumadin, Plavix, Fragmin, Lovenox, etc., or any anti-inflammatories such as:  Aspirin, Ibuprofen, Advil, Naproxen prior to your surgery. We also ask that you stop any OTC medications such as fish oil, vitamin E, glucosamine, garlic, Multivitamins, COQ 10, etc.    We ask that you do not smoke 24 hours prior to surgery  We ask that you do not  drink any alcoholic beverages 24 hours prior to surgery     You must make arrangements for a responsible adult to take you home after your surgery. For your safety you will not be allowed to leave alone or drive yourself home. Your surgery will be cancelled if you do not have a ride home. Also for your safety, it is strongly suggested that someone stay with you the first 24 hours after your surgery. A parent or legal guardian must accompany a child scheduled for surgery and plan to stay at the hospital until the child is discharged. Please do not bring other children with you. For your comfort, please wear simple loose fitting clothing to the hospital.  Please do not bring valuables. Do not wear any make-up or nail polish on your fingers or toes      For your safety, please do not wear any jewelry or body piercing's on the day of surgery. All jewelry must be removed. If you have dentures, they will be removed before going to operating room. For your convenience, we will provide you with a container.     If you wear contact lenses or glasses, they will be removed, please bring a case for them.     If you have a living will and a durable power of  for healthcare, please bring in a copy. As part of our patient safety program to minimize surgical site infections, we ask you to do the following:    · Please notify your surgeon if you develop any illness between         now and the  day of your surgery. · This includes a cough, cold, fever, sore throat, nausea,         or vomiting, and diarrhea, etc.  ·  Please notify your surgeon if you experience dizziness, shortness         of breath or blurred vision between now and the time of your surgery. You may shower the night before surgery or the morning of   your surgery with an antibacterial soap. You will need to bring a photo ID and insurance card    [facility] has an onsite pharmacy, would you like to utilize our pharmacy     If you will be staying overnight and use a C-pap machine, please bring   your C-pap to hospital     Our goal is to provide you with excellent care, therefore, visitors will be limited to two(2) in the room at a time so that we may focus on providing this care for you. Please contact pre-admission testing if you have any further questions. [facility] phone number:  [phone]  Please note these are generalized instructions for all surgical cases, you may be provided with more specific instructions according to your surgery.

## 2020-03-04 ENCOUNTER — ANESTHESIA EVENT (OUTPATIENT)
Dept: ENDOSCOPY | Age: 60
End: 2020-03-04
Payer: COMMERCIAL

## 2020-03-05 ENCOUNTER — HOSPITAL ENCOUNTER (OUTPATIENT)
Age: 60
Setting detail: OUTPATIENT SURGERY
Discharge: HOME | End: 2020-03-05
Attending: INTERNAL MEDICINE | Admitting: INTERNAL MEDICINE
Payer: COMMERCIAL

## 2020-03-05 ENCOUNTER — ANESTHESIA (OUTPATIENT)
Dept: ENDOSCOPY | Age: 60
End: 2020-03-05
Payer: COMMERCIAL

## 2020-03-05 VITALS
OXYGEN SATURATION: 100 % | TEMPERATURE: 96.9 F | SYSTOLIC BLOOD PRESSURE: 113 MMHG | HEIGHT: 66 IN | RESPIRATION RATE: 16 BRPM | DIASTOLIC BLOOD PRESSURE: 61 MMHG | BODY MASS INDEX: 27 KG/M2 | WEIGHT: 168 LBS | HEART RATE: 65 BPM

## 2020-03-05 VITALS — OXYGEN SATURATION: 97 % | SYSTOLIC BLOOD PRESSURE: 82 MMHG | DIASTOLIC BLOOD PRESSURE: 60 MMHG

## 2020-03-05 PROCEDURE — 3700000000 HC ANESTHESIA ATTENDED CARE: Performed by: INTERNAL MEDICINE

## 2020-03-05 PROCEDURE — 6360000002 HC RX W HCPCS: Performed by: NURSE ANESTHETIST, CERTIFIED REGISTERED

## 2020-03-05 PROCEDURE — 2580000003 HC RX 258: Performed by: NURSE ANESTHETIST, CERTIFIED REGISTERED

## 2020-03-05 PROCEDURE — 3700000001 HC ADD 15 MINUTES (ANESTHESIA): Performed by: INTERNAL MEDICINE

## 2020-03-05 PROCEDURE — 2500000003 HC RX 250 WO HCPCS: Performed by: NURSE ANESTHETIST, CERTIFIED REGISTERED

## 2020-03-05 PROCEDURE — 2580000003 HC RX 258: Performed by: ANESTHESIOLOGY

## 2020-03-05 PROCEDURE — 7100000010 HC PHASE II RECOVERY - FIRST 15 MIN: Performed by: INTERNAL MEDICINE

## 2020-03-05 PROCEDURE — 3609027000 HC COLONOSCOPY: Performed by: INTERNAL MEDICINE

## 2020-03-05 PROCEDURE — 7100000011 HC PHASE II RECOVERY - ADDTL 15 MIN: Performed by: INTERNAL MEDICINE

## 2020-03-05 RX ORDER — ONDANSETRON 2 MG/ML
4 INJECTION INTRAMUSCULAR; INTRAVENOUS
Status: DISCONTINUED | OUTPATIENT
Start: 2020-03-05 | End: 2020-03-05 | Stop reason: HOSPADM

## 2020-03-05 RX ORDER — SODIUM CHLORIDE 0.9 % (FLUSH) 0.9 %
10 SYRINGE (ML) INJECTION EVERY 12 HOURS SCHEDULED
Status: DISCONTINUED | OUTPATIENT
Start: 2020-03-05 | End: 2020-03-05 | Stop reason: HOSPADM

## 2020-03-05 RX ORDER — SODIUM CHLORIDE 9 MG/ML
INJECTION, SOLUTION INTRAVENOUS CONTINUOUS PRN
Status: DISCONTINUED | OUTPATIENT
Start: 2020-03-05 | End: 2020-03-05 | Stop reason: SDUPTHER

## 2020-03-05 RX ORDER — PROPOFOL 10 MG/ML
INJECTION, EMULSION INTRAVENOUS PRN
Status: DISCONTINUED | OUTPATIENT
Start: 2020-03-05 | End: 2020-03-05 | Stop reason: SDUPTHER

## 2020-03-05 RX ORDER — LIDOCAINE HYDROCHLORIDE 20 MG/ML
INJECTION, SOLUTION EPIDURAL; INFILTRATION; INTRACAUDAL; PERINEURAL PRN
Status: DISCONTINUED | OUTPATIENT
Start: 2020-03-05 | End: 2020-03-05 | Stop reason: SDUPTHER

## 2020-03-05 RX ORDER — SODIUM CHLORIDE 0.9 % (FLUSH) 0.9 %
10 SYRINGE (ML) INJECTION PRN
Status: DISCONTINUED | OUTPATIENT
Start: 2020-03-05 | End: 2020-03-05 | Stop reason: HOSPADM

## 2020-03-05 RX ORDER — SODIUM CHLORIDE 9 MG/ML
INJECTION, SOLUTION INTRAVENOUS CONTINUOUS
Status: DISCONTINUED | OUTPATIENT
Start: 2020-03-05 | End: 2020-03-05 | Stop reason: HOSPADM

## 2020-03-05 RX ADMIN — SODIUM CHLORIDE: 9 INJECTION, SOLUTION INTRAVENOUS at 08:38

## 2020-03-05 RX ADMIN — PROPOFOL 50 MG: 10 INJECTION, EMULSION INTRAVENOUS at 08:53

## 2020-03-05 RX ADMIN — SODIUM CHLORIDE: 0.9 INJECTION, SOLUTION INTRAVENOUS at 08:06

## 2020-03-05 RX ADMIN — LIDOCAINE HYDROCHLORIDE 50 MG: 20 INJECTION, SOLUTION EPIDURAL; INFILTRATION; INTRACAUDAL; PERINEURAL at 08:42

## 2020-03-05 RX ADMIN — PROPOFOL 50 MG: 10 INJECTION, EMULSION INTRAVENOUS at 08:48

## 2020-03-05 RX ADMIN — PROPOFOL 100 MG: 10 INJECTION, EMULSION INTRAVENOUS at 08:42

## 2020-03-05 RX ADMIN — PROPOFOL 50 MG: 10 INJECTION, EMULSION INTRAVENOUS at 08:58

## 2020-03-05 RX ADMIN — PROPOFOL 50 MG: 10 INJECTION, EMULSION INTRAVENOUS at 08:45

## 2020-03-05 ASSESSMENT — PAIN DESCRIPTION - PROGRESSION: CLINICAL_PROGRESSION: NOT CHANGED

## 2020-03-05 ASSESSMENT — PAIN DESCRIPTION - LOCATION: LOCATION: ABDOMEN

## 2020-03-05 ASSESSMENT — PAIN DESCRIPTION - PAIN TYPE: TYPE: ACUTE PAIN

## 2020-03-05 ASSESSMENT — PAIN DESCRIPTION - ORIENTATION: ORIENTATION: MID

## 2020-03-05 ASSESSMENT — PAIN DESCRIPTION - ONSET: ONSET: ON-GOING

## 2020-03-05 ASSESSMENT — PAIN DESCRIPTION - FREQUENCY: FREQUENCY: CONTINUOUS

## 2020-03-05 ASSESSMENT — PAIN SCALES - GENERAL
PAINLEVEL_OUTOF10: 4
PAINLEVEL_OUTOF10: 0
PAINLEVEL_OUTOF10: 0

## 2020-03-05 ASSESSMENT — PAIN - FUNCTIONAL ASSESSMENT
PAIN_FUNCTIONAL_ASSESSMENT: ACTIVITIES ARE NOT PREVENTED
PAIN_FUNCTIONAL_ASSESSMENT: 0-10

## 2020-03-05 NOTE — ANESTHESIA PRE PROCEDURE
Smoker     Last attempt to quit: 2008     Years since quittin.7    Smokeless tobacco: Never Used   Substance Use Topics    Alcohol use: Yes     Comment: social use    Drug use: No     Medications  No current facility-administered medications on file prior to encounter. Current Outpatient Medications on File Prior to Encounter   Medication Sig Dispense Refill    Calcium Carbonate-Vitamin D (CALTRATE 600+D PO) Take by mouth      Multiple Vitamins-Minerals (THERAPEUTIC MULTIVITAMIN-MINERALS) tablet Take 1 tablet by mouth daily      gabapentin (NEURONTIN) 300 MG capsule TAKE ONE CAPSULE BY MOUTH THREE TIMES A DAY 90 capsule 1    busPIRone (BUSPAR) 10 MG tablet TAKE ONE TABLET BY MOUTH THREE TIMES A DAY 90 tablet 3    naproxen (NAPROSYN) 500 MG tablet TAKE ONE TABLET BY MOUTH TWICE A DAY WITH MEALS 60 tablet 0    omeprazole (PRILOSEC) 20 MG capsule Take 20 mg by mouth daily as needed       senna (SENOKOT) 8.6 MG tablet Take 1 tablet by mouth daily as needed       loratadine (CLARITIN) 10 MG tablet Take 10 mg by mouth daily as needed.  diphenhydrAMINE (BENADRYL) 25 MG tablet Take 25 mg by mouth nightly as needed.        Current Facility-Administered Medications   Medication Dose Route Frequency Provider Last Rate Last Dose    0.9 % sodium chloride infusion   Intravenous Continuous Trinity Cordero MD        sodium chloride flush 0.9 % injection 10 mL  10 mL Intravenous 2 times per day Trinity Cordero MD        sodium chloride flush 0.9 % injection 10 mL  10 mL Intravenous PRN Trinity Cordero MD         Vital Signs (Current)   Vitals:    20 1141   Weight: 165 lb (74.8 kg)   Height: 5' 6\" (1.676 m)                                          BP Readings from Last 3 Encounters:   19 126/74   19 120/76   18 122/70     Vital Signs Statistics (for past 48 hrs)     No data recorded  BP Readings from Last 3 Encounters:   19 126/74   19 120/76 12/18/18 122/70       BMI  Body mass index is 26.63 kg/m². Estimated body mass index is 26.63 kg/m² as calculated from the following:    Height as of this encounter: 5' 6\" (1.676 m). Weight as of this encounter: 165 lb (74.8 kg). CBC   Lab Results   Component Value Date    WBC 8.3 01/02/2015    RBC 4.17 01/02/2015    HGB 12.3 01/02/2015    HCT 37.1 01/02/2015    MCV 88.8 01/02/2015    RDW 13.4 01/02/2015     01/02/2015     CMP    Lab Results   Component Value Date     04/01/2014    K 3.9 04/01/2014     04/01/2014    CO2 25 04/01/2014    BUN 14 04/01/2014    CREATININE 0.5 04/01/2014    GFRAA >60 04/01/2014    GFRAA >60 03/25/2011    AGRATIO 1.5 03/25/2011    LABGLOM >60 04/01/2014    GLUCOSE 102 03/27/2017    PROT 6.9 04/01/2014    PROT 7.6 03/25/2011    CALCIUM 9.7 04/01/2014    BILITOT 0.2 04/01/2014    ALKPHOS 58 04/01/2014    AST 15 04/01/2014    ALT 11 04/01/2014     BMP    Lab Results   Component Value Date     04/01/2014    K 3.9 04/01/2014     04/01/2014    CO2 25 04/01/2014    BUN 14 04/01/2014    CREATININE 0.5 04/01/2014    CALCIUM 9.7 04/01/2014    GFRAA >60 04/01/2014    GFRAA >60 03/25/2011    LABGLOM >60 04/01/2014    GLUCOSE 102 03/27/2017     POCGlucose  No results for input(s): GLUCOSE in the last 72 hours.    Coags  No results found for: PROTIME, INR, APTT  HCG (If Applicable) No results found for: PREGTESTUR, PREGSERUM, HCG, HCGQUANT   ABGs No results found for: PHART, PO2ART, ZHM7ZUC, WKQ4RDK, BEART, K4RCPIRD   Type & Screen (If Applicable)  Lab Results   Component Value Date    LABABO A 04/06/2011    [address] Positive 04/06/2011                            BMI: Wt Readings from Last 3 Encounters:       NPO Status:   Date of last liquid consumption: 03/05/20   Time of last liquid consumption: 0400   Date of last solid food consumption: 03/03/20      Time of last solid consumption: 1900       Anesthesia Evaluation  Patient summary reviewed no history of anesthetic complications:   Airway: Mallampati: II       Mouth opening: > = 3 FB Dental:    (+) upper dentures      Pulmonary:       (-) pneumonia                           Cardiovascular:    (+) dysrhythmias (WPW): SVT,     (-) pacemaker, valvular problems/murmurs and past MI                Neuro/Psych:   (+) neuromuscular disease:, psychiatric history:   (-) seizures and CVA           GI/Hepatic/Renal:   (+) GERD:, bowel prep,      (-) hepatitis and liver disease       Endo/Other:    (+) : arthritis: OA., .    (-) diabetes mellitus, hypothyroidism               Abdominal:           Vascular: negative vascular ROS. Anesthesia Plan      MAC     ASA 2       Induction: intravenous. Anesthetic plan and risks discussed with patient. Plan discussed with CRNA. Attending anesthesiologist reviewed and agrees with Pre Eval content              This pre-anesthesia assessment may be used as a history and physical.    DOS STAFF ADDENDUM:    Pt seen and examined, chart reviewed (including anesthesia, drug and allergy history). No interval changes to history and physical examination. Anesthetic plan, risks, benefits, alternatives, and personnel involved discussed with patient. Patient verbalized an understanding and agrees to proceed.       Paola Bingham MD  March 5, 2020  7:44 AM

## 2020-03-05 NOTE — H&P
Pre-operative History and Physical    Patient: Jeyson Jimenez  : 1960  Acct#:     Intended Procedure:  Colonoscopy     HISTORY OF PRESENT ILLNESS:  The patient is a 64 y.o. female  who presents for/due to Surveillance/hx Colon Polyps     Past Medical History:        Diagnosis Date    Allergic rhinitis     Anxiety disorder     Depression     HSV (herpes simplex virus) infection     IBS (irritable bowel syndrome)     SVT (supraventricular tachycardia) (HCC)     Stewart-Parkinson-White syndrome      Past Surgical History:        Procedure Laterality Date    APPENDECTOMY  1999    CHOLECYSTECTOMY      INCISIONAL HERNIA REPAIR  16    with mesh    PARTIAL HYSTERECTOMY  1999    TONSILLECTOMY       Medications Prior to Admission:   Prior to Admission medications    Medication Sig Start Date End Date Taking? Authorizing Provider   Calcium Carbonate-Vitamin D (CALTRATE 600+D PO) Take by mouth   Yes Historical Provider, MD   Multiple Vitamins-Minerals (THERAPEUTIC MULTIVITAMIN-MINERALS) tablet Take 1 tablet by mouth daily   Yes Historical Provider, MD   gabapentin (NEURONTIN) 300 MG capsule TAKE ONE CAPSULE BY MOUTH THREE TIMES A DAY 1/15/20 2/27/20 Yes Antonino Barger DO   busPIRone (BUSPAR) 10 MG tablet TAKE ONE TABLET BY MOUTH THREE TIMES A DAY 19  Yes Antonino Barger DO   naproxen (NAPROSYN) 500 MG tablet TAKE ONE TABLET BY MOUTH TWICE A DAY WITH MEALS 19  Yes Antonino Barger DO   omeprazole (PRILOSEC) 20 MG capsule Take 20 mg by mouth daily as needed    Yes Historical Provider, MD   senna (SENOKOT) 8.6 MG tablet Take 1 tablet by mouth daily as needed    Yes Historical Provider, MD   loratadine (CLARITIN) 10 MG tablet Take 10 mg by mouth daily as needed. Yes Historical Provider, MD   diphenhydrAMINE (BENADRYL) 25 MG tablet Take 25 mg by mouth nightly as needed. Yes Historical Provider, MD       Allergies:  Dye [iodides];  Cephalexin; Clindamycin/lincomycin cross home

## 2020-03-05 NOTE — OP NOTE
gI       Colonoscopy Procedure Note      Patient: Vanna Lazo  : 1960  Acct#:     Procedure: Colonoscopy    Date:  3/5/2020    Surgeon:  Britt Stewart MD    Referring Physician:  Joanne Partida DO    Previous Colonoscopy: YES  Date:   Greater than 3 years: YES    Preoperative Diagnosis:  1. Surveillance/Hx Colon Polyps     Postoperative Diagnosis:  1. Normal Colonoscopy     Consent:  The patient or their legal guardian has signed a consent, and is aware of the potential risks, benefits, alternatives, and potential complications of this procedure. These include, but are not limited to hemorrhage, bleeding, post procedural pain, perforation, phlebitis, aspiration, hypotension, hypoxia, cardiovascular events such as arryhthmia, and possibly death. Additionally, the possibility of missed colonic polyps and interval colon cancer was discussed in the consent. Anesthesia: The patient was administered IV propofol per anesthesiology team.  Please see their operative records for full details. Procedure: An informed consent was obtained from the patient after explanation of indications, benefits, possible risks and complications of the procedure. The patient was then taken to the endoscopy suite, placed in the left lateral decubitus position, and the above IV anesthesia was administered. A digital rectal examination was performed and revealed negative without mass, lesions or tenderness. The Olympus video colonoscope was placed in the patient's rectum under digital direction and advanced to the cecum. The cecum was identified by characteristic anatomy and ballottment. The preparation was excellent. The ileocecal valve was identified. The scope was then withdrawn back through the cecum, ascending, transverse, descending, sigmoid colon, and rectum. Careful circumferential examination of the mucosa in these areas demonstrated:    1.  The entire colon was normal with no polyps,

## 2020-03-13 RX ORDER — GABAPENTIN 300 MG/1
CAPSULE ORAL
Qty: 90 CAPSULE | Refills: 0 | Status: SHIPPED | OUTPATIENT
Start: 2020-03-13 | End: 2020-04-13

## 2020-04-13 RX ORDER — GABAPENTIN 300 MG/1
CAPSULE ORAL
Qty: 90 CAPSULE | Refills: 0 | Status: SHIPPED | OUTPATIENT
Start: 2020-04-13 | End: 2020-04-14

## 2020-04-14 RX ORDER — NAPROXEN 500 MG/1
TABLET ORAL
Qty: 60 TABLET | Refills: 0 | Status: SHIPPED | OUTPATIENT
Start: 2020-04-14 | End: 2020-07-10 | Stop reason: SDUPTHER

## 2020-04-14 RX ORDER — BUSPIRONE HYDROCHLORIDE 10 MG/1
TABLET ORAL
Qty: 90 TABLET | Refills: 2 | Status: SHIPPED | OUTPATIENT
Start: 2020-04-14 | End: 2020-10-05

## 2020-04-14 RX ORDER — GABAPENTIN 300 MG/1
CAPSULE ORAL
Qty: 90 CAPSULE | Refills: 0 | Status: SHIPPED | OUTPATIENT
Start: 2020-04-14 | End: 2020-06-09

## 2020-06-09 RX ORDER — GABAPENTIN 300 MG/1
CAPSULE ORAL
Qty: 90 CAPSULE | Refills: 0 | Status: SHIPPED | OUTPATIENT
Start: 2020-06-09 | End: 2020-07-07

## 2020-07-07 RX ORDER — GABAPENTIN 300 MG/1
CAPSULE ORAL
Qty: 90 CAPSULE | Refills: 0 | Status: SHIPPED | OUTPATIENT
Start: 2020-07-07 | End: 2020-07-10

## 2020-07-10 ENCOUNTER — OFFICE VISIT (OUTPATIENT)
Dept: FAMILY MEDICINE CLINIC | Age: 60
End: 2020-07-10
Payer: COMMERCIAL

## 2020-07-10 VITALS
SYSTOLIC BLOOD PRESSURE: 120 MMHG | BODY MASS INDEX: 27.64 KG/M2 | DIASTOLIC BLOOD PRESSURE: 80 MMHG | TEMPERATURE: 98.4 F | WEIGHT: 172 LBS | HEIGHT: 66 IN

## 2020-07-10 DIAGNOSIS — R25.2 LEG CRAMPS: ICD-10-CM

## 2020-07-10 DIAGNOSIS — R53.82 CHRONIC FATIGUE: ICD-10-CM

## 2020-07-10 LAB
ANION GAP SERPL CALCULATED.3IONS-SCNC: 14 MMOL/L (ref 3–16)
BASOPHILS ABSOLUTE: 0 K/UL (ref 0–0.2)
BASOPHILS RELATIVE PERCENT: 0.7 %
BUN BLDV-MCNC: 14 MG/DL (ref 7–20)
CALCIUM SERPL-MCNC: 9 MG/DL (ref 8.3–10.6)
CHLORIDE BLD-SCNC: 101 MMOL/L (ref 99–110)
CO2: 23 MMOL/L (ref 21–32)
CREAT SERPL-MCNC: 0.6 MG/DL (ref 0.6–1.1)
EOSINOPHILS ABSOLUTE: 0.1 K/UL (ref 0–0.6)
EOSINOPHILS RELATIVE PERCENT: 1.9 %
GFR AFRICAN AMERICAN: >60
GFR NON-AFRICAN AMERICAN: >60
GLUCOSE BLD-MCNC: 99 MG/DL (ref 70–99)
HCT VFR BLD CALC: 40.8 % (ref 36–48)
HEMOGLOBIN: 13.4 G/DL (ref 12–16)
LYMPHOCYTES ABSOLUTE: 2.3 K/UL (ref 1–5.1)
LYMPHOCYTES RELATIVE PERCENT: 37 %
MCH RBC QN AUTO: 29.6 PG (ref 26–34)
MCHC RBC AUTO-ENTMCNC: 32.8 G/DL (ref 31–36)
MCV RBC AUTO: 90.2 FL (ref 80–100)
MONOCYTES ABSOLUTE: 0.5 K/UL (ref 0–1.3)
MONOCYTES RELATIVE PERCENT: 8.1 %
NEUTROPHILS ABSOLUTE: 3.3 K/UL (ref 1.7–7.7)
NEUTROPHILS RELATIVE PERCENT: 52.3 %
PDW BLD-RTO: 13.6 % (ref 12.4–15.4)
PLATELET # BLD: 283 K/UL (ref 135–450)
PMV BLD AUTO: 8.8 FL (ref 5–10.5)
POTASSIUM SERPL-SCNC: 3.9 MMOL/L (ref 3.5–5.1)
RBC # BLD: 4.52 M/UL (ref 4–5.2)
SODIUM BLD-SCNC: 138 MMOL/L (ref 136–145)
TSH SERPL DL<=0.05 MIU/L-ACNC: 1.11 UIU/ML (ref 0.27–4.2)
WBC # BLD: 6.2 K/UL (ref 4–11)

## 2020-07-10 PROCEDURE — 99214 OFFICE O/P EST MOD 30 MIN: CPT | Performed by: INTERNAL MEDICINE

## 2020-07-10 RX ORDER — NAPROXEN 500 MG/1
TABLET ORAL
Qty: 60 TABLET | Refills: 2 | Status: SHIPPED | OUTPATIENT
Start: 2020-07-10 | End: 2021-08-03

## 2020-07-10 RX ORDER — GABAPENTIN 300 MG/1
CAPSULE ORAL
Qty: 90 CAPSULE | Refills: 0 | Status: SHIPPED | OUTPATIENT
Start: 2020-07-10 | End: 2020-09-02

## 2020-07-10 RX ORDER — GABAPENTIN 300 MG/1
CAPSULE ORAL
Qty: 90 CAPSULE | Status: CANCELLED | OUTPATIENT
Start: 2020-07-10 | End: 2020-08-10

## 2020-07-10 ASSESSMENT — ENCOUNTER SYMPTOMS
SORE THROAT: 0
SINUS PRESSURE: 0
APNEA: 0
ABDOMINAL PAIN: 0
RHINORRHEA: 0
WHEEZING: 0
SHORTNESS OF BREATH: 0
COUGH: 0

## 2020-07-10 NOTE — PROGRESS NOTES
Subjective:      Patient ID: Diana Prado is a 64 y.o. female.     HPI   Chief Complaint   Patient presents with    Fatigue     falling asleep alot even at work no energy    Leg Pain     cramping up at night     Diana Prado is a 64 y.o. female with the following history as recorded in [employer]:  Patient Active Problem List    Diagnosis Date Noted    Trigger index finger of right hand 05/07/2018    Primary osteoarthritis of first carpometacarpal joint of right hand 05/07/2018    Pain of right thumb 03/07/2018    Pink eye disease of right eye 03/07/2018    Incisional hernia, without obstruction or gangrene     Neck pain 05/23/2016    Abdominal wall pain 05/23/2016    Skull mass 02/13/2015    Skin lesion of face 12/12/2014    Skin lesion 12/12/2014    Trigeminal neuralgia 11/11/2014    Change in vision 10/14/2014    Pharyngitis 07/01/2014    Left foot pain 07/01/2014    Chills 07/01/2014    GERD (gastroesophageal reflux disease) 04/01/2014    Abdominal wall pain in epigastric region 04/01/2014    Nausea 04/01/2014    IBS (irritable bowel syndrome)     Anxiety disorder     Depression     Allergic rhinitis     HSV (herpes simplex virus) infection     SV[facility]) [facility])      Current Outpatient Medications   Medication Sig Dispense Refill    gabapentin (NEURONTIN) 300 MG capsule TAKE ONE CAPSULE BY MOUTH THREE TIMES A DAY 90 capsule 0    naproxen (NAPROSYN) 500 MG tablet TAKE ONE TABLET BY MOUTH TWICE A DAY WITH MEALS 60 tablet 0    busPIRone (BUSPAR) 10 MG tablet TAKE ONE TABLET BY MOUTH THREE TIMES A DAY 90 tablet 2    Calcium Carbonate-Vitamin D (CALTRATE 600+D PO) Take by mouth      Multiple Vitamins-Minerals (THERAPEUTIC MULTIVITAMIN-MINERALS) tablet Take 1 tablet by mouth daily      omeprazole (PRILOSEC) 20 MG capsule Take 20 mg by mouth daily as needed       senna (SENOKOT) 8.6 MG tablet Take 1 tablet by mouth daily as needed       loratadine (CLARITIN) 10 MG tablet Take 10 mg by mouth daily as needed.  diphenhydrAMINE (BENADRYL) 25 MG tablet Take 25 mg by mouth nightly as needed. No current facility-administered medications for this visit. Allergies: Dye [iodides]; Cephalexin; Clindamycin/lincomycin cross reactors; Erythromycin; Nortriptyline; Paxil [paroxetine]; Pcn [penicillins]; Sertraline; Sulfa antibiotics; Tetracyclines & related; and Amoxicillin-pot clavulanate  Past Medical History:   Diagnosis Date    Allergic rhinitis     Anxiety disorder     Depression     HSV (herpes simplex virus) infection     IBS (irritable bowel syndrome)     SVT (supraventricular tachycardia) (HCC)     Stewart-Parkinson-White syndrome      Past Surgical History:   Procedure Laterality Date    APPENDECTOMY  1999    CHOLECYSTECTOMY      COLONOSCOPY  2020    Dr. Ruby Laughlin    COLONOSCOPY N/A 3/5/2020    COLORECTAL CANCER SCREENING, NOT HIGH RISK performed by Ferdinand Lundborg, MD at [address]  16    with mesh    PARTIAL HYSTERECTOMY  1999    TONSILLECTOMY       Family History   Problem Relation Age of Onset    Heart Disease Father     Diabetes Father     Cancer Mother         uterine    Kidney Disease Paternal Aunt      Social History     Tobacco Use    Smoking status: Former Smoker     Last attempt to quit: 2008     Years since quittin.0    Smokeless tobacco: Never Used   Substance Use Topics    Alcohol use: Yes     Comment: social use       Review of Systems   Constitutional: Negative for chills, diaphoresis and fever. HENT: Negative for congestion, postnasal drip, rhinorrhea, sinus pressure and sore throat. Eyes: Negative for visual disturbance. Respiratory: Negative for apnea, cough, shortness of breath and wheezing. Cardiovascular: Negative for chest pain and palpitations. Gastrointestinal: Negative for abdominal pain.    Genitourinary: Negative for dysuria, MOUTH THREE TIMES A DAY 90 tablet 2    Calcium Carbonate-Vitamin D (CALTRATE 600+D PO) Take by mouth      Multiple Vitamins-Minerals (THERAPEUTIC MULTIVITAMIN-MINERALS) tablet Take 1 tablet by mouth daily      omeprazole (PRILOSEC) 20 MG capsule Take 20 mg by mouth daily as needed       senna (SENOKOT) 8.6 MG tablet Take 1 tablet by mouth daily as needed       loratadine (CLARITIN) 10 MG tablet Take 10 mg by mouth daily as needed.  diphenhydrAMINE (BENADRYL) 25 MG tablet Take 25 mg by mouth nightly as needed.  [DISCONTINUED] naproxen (NAPROSYN) 500 MG tablet TAKE ONE TABLET BY MOUTH TWICE A DAY WITH MEALS 60 tablet 0     No facility-administered encounter medications on file as of 7/10/2020.       Orders Placed This Encounter   Procedures    Basic Metabolic Panel     Standing Status:   Future     Standing Expiration Date:   7/10/2021    CBC Auto Differential     Standing Status:   Future     Standing Expiration Date:   7/10/2021    TSH without Reflex     Standing Status:   Future     Standing Expiration Date:   7/10/2021           [facility] DO [place]

## 2020-08-10 ENCOUNTER — VIRTUAL VISIT (OUTPATIENT)
Dept: FAMILY MEDICINE CLINIC | Age: 60
End: 2020-08-10
Payer: COMMERCIAL

## 2020-08-10 PROCEDURE — 99213 OFFICE O/P EST LOW 20 MIN: CPT | Performed by: INTERNAL MEDICINE

## 2020-08-10 RX ORDER — CIPROFLOXACIN 500 MG/1
500 TABLET, FILM COATED ORAL 2 TIMES DAILY
Qty: 20 TABLET | Refills: 0 | Status: SHIPPED | OUTPATIENT
Start: 2020-08-10 | End: 2020-08-20

## 2020-08-10 ASSESSMENT — ENCOUNTER SYMPTOMS
RHINORRHEA: 1
SINUS PAIN: 1
SHORTNESS OF BREATH: 0
APNEA: 0
SINUS PRESSURE: 1
ABDOMINAL PAIN: 0
COUGH: 1

## 2020-08-10 ASSESSMENT — PATIENT HEALTH QUESTIONNAIRE - PHQ9
SUM OF ALL RESPONSES TO PHQ QUESTIONS 1-9: 0
1. LITTLE INTEREST OR PLEASURE IN DOING THINGS: 0
2. FEELING DOWN, DEPRESSED OR HOPELESS: 0
SUM OF ALL RESPONSES TO PHQ QUESTIONS 1-9: 0
SUM OF ALL RESPONSES TO PHQ9 QUESTIONS 1 & 2: 0

## 2020-08-10 NOTE — PATIENT INSTRUCTIONS
Thank you for choosing [facility]. Please bring a current list of medications to every appointment. Please contact your pharmacy for any prescription refill(s) that you are requesting.

## 2020-08-10 NOTE — PROGRESS NOTES
Ciro Torres is a 64 y.o. female evaluated via telephone on 8/10/2020. Consent:  She and/or health care decision maker is aware that that she may receive a bill for this telephone service, depending on her insurance coverage, and has provided verbal consent to proceed: Yes  Chief Complaint   Patient presents with    Sinusitis     ph. [phone]. patient c/o laryngitis, itchy watery eyes, clogged ears, head congestion, productive cough x 2 days; sore throat from coughing. patient denies fever.   patient has taken OTC Advil and Severe Cold & Flu medication     Ciro Torres is a 64 y.o. female [facility] the [facility]:  Patient Active Problem List    Diagnosis Date Noted    Trigger index finger of right hand 05/07/2018    Primary osteoarthritis of first carpometacarpal joint of right hand 05/07/2018    Pain of right thumb 03/07/2018    Pink eye disease of right eye 03/07/2018    Incisional hernia, without obstruction or gangrene     Neck pain 05/23/2016    Abdominal wall pain 05/23/2016    Skull mass 02/13/2015    Skin lesion of face 12/12/2014    Skin lesion 12/12/2014    Trigeminal neuralgia 11/11/2014    Change in vision 10/14/2014    Pharyngitis 07/01/2014    Left foot pain 07/01/2014    Chills 07/01/2014    GERD (gastroesophageal reflux disease) 04/01/2014    Abdominal wall pain in epigastric region 04/01/2014    Nausea 04/01/2014    IBS (irritable bowel syndrome)     Anxiety disorder     Depression     Allergic rhinitis     HSV (herpes simplex virus) infection     SVT [facility]supra[facility]ricular tachycardia) ([facility])      Current Outpatient Medications   Medication Sig Dispense Refill    gabapentin (NEURONTIN) 300 MG capsule TAKE ONE CAPSULE BY MOUTH THREE TIMES A DAY 90 capsule 0    naproxen (NAPROSYN) 500 MG tablet TAKE ONE TABLET BY MOUTH TWICE A DAY WITH MEALS 60 tablet 2    busPIRone (BUSPAR) 10 MG tablet TAKE ONE TABLET BY MOUTH THREE TIMES A DAY 90 tablet 2    Calcium Carbonate-Vitamin D (CALTRATE 600+D PO) Take by mouth      Multiple Vitamins-Minerals (THERAPEUTIC MULTIVITAMIN-MINERALS) tablet Take 1 tablet by mouth daily      omeprazole (PRILOSEC) 20 MG capsule Take 20 mg by mouth daily as needed       senna (SENOKOT) 8.6 MG tablet Take 1 tablet by mouth daily as needed       loratadine (CLARITIN) 10 MG tablet Take 10 mg by mouth daily as needed.  diphenhydrAMINE (BENADRYL) 25 MG tablet Take 25 mg by mouth nightly as needed. No current facility-administered medications for this visit. Allergies: Dye [iodides]; Cephalexin; Clindamycin/lincomycin cross reactors; Erythromycin; Nortriptyline; Paxil [paroxetine]; Pcn [penicillins]; Sertraline; Sulfa antibiotics;  Tetracyclines & related; and Amoxicillin-pot clavulanate  Past Medical History:   Diagnosis Date    Allergic rhinitis     Anxiety disorder     Depression     HSV (herpes simplex virus) infection     IBS (irritable bowel syndrome)     SVT (supraventricular tachycardia) (HCC)     Stewart-Parkinson-White syndrome      Past Surgical History:   Procedure Laterality Date    APPENDECTOMY  1999    CHOLECYSTECTOMY      COLONOSCOPY  2020    Dr. Thelma Terry    COLONOSCOPY N/A 3/5/2020    COLORECTAL CANCER SCREENING, NOT HIGH RISK performed by Cierra Porter MD at [address] [place] [address]  16    with mesh    PARTIAL HYSTERECTOMY  1999    TONSILLECTOMY       Family History   Problem Relation Age of Onset    Heart Disease Father     Diabetes Father     Cancer Mother         uterine    Kidney Disease Paternal Aunt      Social History     Tobacco Use    Smoking status: Former Smoker     Last attempt to quit: 2008     Years since quittin.1    Smokeless tobacco: Never Used   Substance Use Topics    Alcohol use: Yes     Comment: social use   Review of Systems   Constitutional: Negative for chills, diaphoresis, fatigue and fever.   HENT: Positive for congestion, postnasal drip, rhinorrhea, sinus pressure and sinus pain. Eyes: Negative for visual disturbance. Respiratory: Positive for cough. Negative for apnea and shortness of breath. Cardiovascular: Negative for chest pain. Gastrointestinal: Negative for abdominal pain. Genitourinary: Negative for dysuria and frequency. Documentation:  I communicated with the patient and/or health care decision maker about   Chief Complaint   Patient presents with    Sinusitis     ph. [phone]. patient c/o laryngitis, itchy watery eyes, clogged ears, head congestion, productive cough x 2 days; sore throat from coughing. patient denies fever. patient has taken OTC Advil and Severe Cold & Flu medication   . Details of this discussion including any medical advice provided:   Outpatient Encounter Medications as of 8/10/2020   Medication Sig Dispense Refill    ciprofloxacin (CIPRO) 500 MG tablet Take 1 tablet by mouth 2 times daily for 10 days 20 tablet 0    gabapentin (NEURONTIN) 300 MG capsule TAKE ONE CAPSULE BY MOUTH THREE TIMES A DAY 90 capsule 0    naproxen (NAPROSYN) 500 MG tablet TAKE ONE TABLET BY MOUTH TWICE A DAY WITH MEALS 60 tablet 2    busPIRone (BUSPAR) 10 MG tablet TAKE ONE TABLET BY MOUTH THREE TIMES A DAY 90 tablet 2    Calcium Carbonate-Vitamin D (CALTRATE 600+D PO) Take by mouth      Multiple Vitamins-Minerals (THERAPEUTIC MULTIVITAMIN-MINERALS) tablet Take 1 tablet by mouth daily      omeprazole (PRILOSEC) 20 MG capsule Take 20 mg by mouth daily as needed       senna (SENOKOT) 8.6 MG tablet Take 1 tablet by mouth daily as needed       loratadine (CLARITIN) 10 MG tablet Take 10 mg by mouth daily as needed.  diphenhydrAMINE (BENADRYL) 25 MG tablet Take 25 mg by mouth nightly as needed. No facility-administered encounter medications on file as of 8/10/2020. No orders of the defined types were placed in this encounter.         I affirm

## 2020-09-02 RX ORDER — GABAPENTIN 300 MG/1
CAPSULE ORAL
Qty: 90 CAPSULE | Refills: 0 | Status: SHIPPED | OUTPATIENT
Start: 2020-09-02 | End: 2020-09-29

## 2020-09-29 RX ORDER — GABAPENTIN 300 MG/1
CAPSULE ORAL
Qty: 90 CAPSULE | Refills: 0 | Status: SHIPPED | OUTPATIENT
Start: 2020-09-29 | End: 2020-10-26

## 2020-10-05 ENCOUNTER — OFFICE VISIT (OUTPATIENT)
Dept: FAMILY MEDICINE CLINIC | Age: 60
End: 2020-10-05
Payer: COMMERCIAL

## 2020-10-05 VITALS
HEIGHT: 66 IN | SYSTOLIC BLOOD PRESSURE: 118 MMHG | DIASTOLIC BLOOD PRESSURE: 72 MMHG | BODY MASS INDEX: 28.67 KG/M2 | TEMPERATURE: 97.9 F | WEIGHT: 178.4 LBS

## 2020-10-05 PROCEDURE — 99213 OFFICE O/P EST LOW 20 MIN: CPT | Performed by: INTERNAL MEDICINE

## 2020-10-05 RX ORDER — METHYLPREDNISOLONE 4 MG/1
TABLET ORAL
Qty: 1 KIT | Refills: 0 | Status: SHIPPED | OUTPATIENT
Start: 2020-10-05 | End: 2020-11-30 | Stop reason: ALTCHOICE

## 2020-10-05 ASSESSMENT — ENCOUNTER SYMPTOMS
ABDOMINAL PAIN: 0
APNEA: 0
COUGH: 0
WHEEZING: 0

## 2020-10-05 NOTE — PROGRESS NOTES
Subjective:      Patient ID: Evonne Albarran is a 64 y.o. female. HPI   Chief Complaint   Patient presents with    Finger Pain     patient c/o right thumb pain \"for awhile\"- worse x 1 month.  patient denies injury and has consulted with Dr. Camila Carolina in 2018.   patient has taken Naprosyn as needed     Evonne Albarran is a 64 y.o. female [facility] the [facility]  Patient Active Problem List    Diagnosis Date Noted    Trigger index finger of right hand 05/07/2018    Primary osteoarthritis of first carpometacarpal joint of right hand 05/07/2018    Pain of right thumb 03/07/2018    Pink eye disease of right eye 03/07/2018    Incisional hernia, without obstruction or gangrene     Neck pain 05/23/2016    Abdominal wall pain 05/23/2016    Skull mass 02/13/2015    Skin lesion of face 12/12/2014    Skin lesion 12/12/2014    Trigeminal neuralgia 11/11/2014    Change in vision 10/14/2014    Pharyngitis 07/01/2014    Left foot pain 07/01/2014    Chills 07/01/2014    GERD (gastroesophageal reflux disease) 04/01/2014    Abdominal wall pain in epigastric region 04/01/2014    Nausea 04/01/2014    IBS (irritable bowel syndrome)     Anxiety disorder     Depression     Allergic rhinitis     HSV (herpes simplex virus) infection     [facility] ([facility])      Current Outpatient Medications   Medication Sig Dispense Refill    gabapentin (NEURONTIN) 300 MG capsule TAKE ONE CAPSULE BY MOUTH THREE TIMES A DAY 90 capsule 0    naproxen (NAPROSYN) 500 MG tablet TAKE ONE TABLET BY MOUTH TWICE A DAY WITH MEALS 60 tablet 2    Calcium Carbonate-Vitamin D (CALTRATE 600+D PO) Take by mouth      Multiple Vitamins-Minerals (THERAPEUTIC MULTIVITAMIN-MINERALS) tablet Take 1 tablet by mouth daily      omeprazole (PRILOSEC) 20 MG capsule Take 20 mg by mouth daily as needed       senna (SENOKOT) 8.6 MG tablet Take 1 tablet by mouth daily as needed       loratadine (CLARITIN) 10 MG tablet Take 10 mg by mouth daily as needed.  diphenhydrAMINE (BENADRYL) 25 MG tablet Take 25 mg by mouth nightly as needed. No current facility-administered medications for this visit. Allergies: Dye [iodides]; Cephalexin; Clindamycin/lincomycin cross reactors; Erythromycin; Nortriptyline; Paxil [paroxetine]; Pcn [penicillins]; Sertraline; Sulfa antibiotics; Tetracyclines & related; and Amoxicillin-pot clavulanate  Past Medical History:   Diagnosis Date    Allergic rhinitis     Anxiety disorder     Depression     HSV (herpes simplex virus) infection     IBS (irritable bowel syndrome)     SVT (supraventricular tachycardia) (HCC)     Stewart-Parkinson-White syndrome      Past Surgical History:   Procedure Laterality Date    APPENDECTOMY  1999    CHOLECYSTECTOMY      COLONOSCOPY  2020    Dr. Zara Maravilla    COLONOSCOPY N/A 3/5/2020    COLORECTAL CANCER SCREENING, NOT HIGH RISK performed by Carla Burks MD at [address]  16    with mesh    PARTIAL HYSTERECTOMY  1999    TONSILLECTOMY       Family History   Problem Relation Age of Onset    Heart Disease Father     Diabetes Father     Cancer Mother         uterine    Kidney Disease Paternal Aunt      Social History     Tobacco Use    Smoking status: Former Smoker     Last attempt to quit: 2008     Years since quittin.3    Smokeless tobacco: Never Used   Substance Use Topics    Alcohol use: Yes     Comment: social use       Review of Systems   Constitutional: Negative for chills, diaphoresis and fatigue. HENT: Negative for congestion and postnasal drip. Eyes: Negative for visual disturbance. Respiratory: Negative for apnea, cough and wheezing. Cardiovascular: Negative for chest pain and palpitations. Gastrointestinal: Negative for abdominal pain. Genitourinary: Negative for dysuria and frequency.    Musculoskeletal:        Patient presents with:  Finger Pain: patient c/o right thumb pain \"for awhile\"- worse x 1 month.  patient denies injury and has consulted with Dr. Jazzmine Muñoz in 2018. patient has taken Naprosyn as needed         Objective:   Physical Exam  Constitutional:       General: She is not in acute distress. Appearance: Normal appearance. HENT:      Head: Normocephalic and atraumatic. Cardiovascular:      Rate and Rhythm: Normal rate. Heart sounds: No murmur. Pulmonary:      Effort: No respiratory distress. Abdominal:      General: There is no distension. Tenderness: There is no abdominal tenderness. Musculoskeletal:      Comments: Pain base of R thumb   Neurological:      Mental Status: She is alert. Assessment:       Diagnosis Orders   1. Pain of right thumb           Plan:      Outpatient Encounter Medications as of 10/5/2020   Medication Sig Dispense Refill    methylPREDNISolone (MEDROL, ZHANNA,) 4 MG tablet Take by mouth. 1 kit 0    gabapentin (NEURONTIN) 300 MG capsule TAKE ONE CAPSULE BY MOUTH THREE TIMES A DAY 90 capsule 0    naproxen (NAPROSYN) 500 MG tablet TAKE ONE TABLET BY MOUTH TWICE A DAY WITH MEALS 60 tablet 2    Calcium Carbonate-Vitamin D (CALTRATE 600+D PO) Take by mouth      Multiple Vitamins-Minerals (THERAPEUTIC MULTIVITAMIN-MINERALS) tablet Take 1 tablet by mouth daily      omeprazole (PRILOSEC) 20 MG capsule Take 20 mg by mouth daily as needed       senna (SENOKOT) 8.6 MG tablet Take 1 tablet by mouth daily as needed       loratadine (CLARITIN) 10 MG tablet Take 10 mg by mouth daily as needed.  diphenhydrAMINE (BENADRYL) 25 MG tablet Take 25 mg by mouth nightly as needed.  [DISCONTINUED] busPIRone (BUSPAR) 10 MG tablet TAKE ONE TABLET BY MOUTH THREE TIMES A DAY 90 tablet 2     No facility-administered encounter medications on file as of 10/5/2020. No orders of the defined types were placed in this encounter.   back to dr Petra Conklin if no improvement        Richie Mosley

## 2020-10-05 NOTE — PATIENT INSTRUCTIONS
Thank you for choosing [facility]. Please bring a current list of medications to every appointment. Please contact your pharmacy for any prescription refill(s) that you are requesting.

## 2020-10-26 RX ORDER — GABAPENTIN 300 MG/1
CAPSULE ORAL
Qty: 90 CAPSULE | Refills: 0 | Status: SHIPPED | OUTPATIENT
Start: 2020-10-26 | End: 2020-11-23

## 2020-11-23 RX ORDER — GABAPENTIN 300 MG/1
CAPSULE ORAL
Qty: 90 CAPSULE | Refills: 0 | Status: SHIPPED | OUTPATIENT
Start: 2020-11-23 | End: 2020-12-21

## 2020-11-30 ENCOUNTER — OFFICE VISIT (OUTPATIENT)
Dept: FAMILY MEDICINE CLINIC | Age: 60
End: 2020-11-30
Payer: COMMERCIAL

## 2020-11-30 ENCOUNTER — HOSPITAL ENCOUNTER (OUTPATIENT)
Age: 60
Discharge: HOME | End: 2020-11-30
Payer: COMMERCIAL

## 2020-11-30 VITALS
HEIGHT: 66 IN | BODY MASS INDEX: 28.57 KG/M2 | DIASTOLIC BLOOD PRESSURE: 78 MMHG | TEMPERATURE: 97.5 F | SYSTOLIC BLOOD PRESSURE: 122 MMHG | WEIGHT: 177.8 LBS

## 2020-11-30 LAB
EKG ATRIAL RATE: 75 BPM
EKG DIAGNOSIS: NORMAL
EKG P AXIS: 64 DEGREES
EKG P-R INTERVAL: 144 MS
EKG Q-T INTERVAL: 384 MS
EKG QRS DURATION: 86 MS
EKG QTC CALCULATION (BAZETT): 428 MS
EKG R AXIS: 50 DEGREES
EKG T AXIS: 50 DEGREES
EKG VENTRICULAR RATE: 75 BPM

## 2020-11-30 PROCEDURE — 99242 OFF/OP CONSLTJ NEW/EST SF 20: CPT | Performed by: INTERNAL MEDICINE

## 2020-11-30 PROCEDURE — 93010 ELECTROCARDIOGRAM REPORT: CPT | Performed by: INTERNAL MEDICINE

## 2020-11-30 PROCEDURE — 93005 ELECTROCARDIOGRAM TRACING: CPT

## 2020-11-30 RX ORDER — LANOLIN ALCOHOL/MO/W.PET/CERES
3 CREAM (GRAM) TOPICAL NIGHTLY PRN
COMMUNITY

## 2020-11-30 RX ORDER — CETIRIZINE HYDROCHLORIDE 10 MG/1
10 TABLET ORAL PRN
COMMUNITY

## 2020-11-30 ASSESSMENT — PATIENT HEALTH QUESTIONNAIRE - PHQ9
SUM OF ALL RESPONSES TO PHQ QUESTIONS 1-9: 0
SUM OF ALL RESPONSES TO PHQ9 QUESTIONS 1 & 2: 0
SUM OF ALL RESPONSES TO PHQ QUESTIONS 1-9: 0
SUM OF ALL RESPONSES TO PHQ QUESTIONS 1-9: 0
2. FEELING DOWN, DEPRESSED OR HOPELESS: 0
1. LITTLE INTEREST OR PLEASURE IN DOING THINGS: 0

## 2020-11-30 ASSESSMENT — ENCOUNTER SYMPTOMS
SINUS PRESSURE: 0
ABDOMINAL PAIN: 0
CHEST TIGHTNESS: 0
SORE THROAT: 0
SHORTNESS OF BREATH: 0
CONSTIPATION: 0
BLOOD IN STOOL: 0
SINUS PAIN: 0
APNEA: 0
RHINORRHEA: 0
WHEEZING: 0
COUGH: 0

## 2020-11-30 NOTE — PROGRESS NOTES
Subjective:      Patient ID: Anjana Amor is a 64 y.o. female.     HPI   Chief Complaint   Patient presents with    Pre-op Exam     left cataract surgery with Dr. Jazmin Diaz on 12/8/2020  fax: [phone]     Anjana Amor is a 64 y.o. female with the following history as recorded in [employer]:  Patient Active Problem List    Diagnosis Date Noted    Trigger index finger of right hand 05/07/2018    Primary osteoarthritis of first carpometacarpal joint of right hand 05/07/2018    Pain of right thumb 03/07/2018    Pink eye disease of right eye 03/07/2018    Incisional hernia, without obstruction or gangrene     Neck pain 05/23/2016    Abdominal wall pain 05/23/2016    Skull mass 02/13/2015    Skin lesion of face 12/12/2014    Skin lesion 12/12/2014    Trigeminal neuralgia 11/11/2014    Change in vision 10/14/2014    Pharyngitis 07/01/2014    Left foot pain 07/01/2014    Chills 07/01/2014    GERD (gastroesophageal reflux disease) 04/01/2014    Abdominal wall pain in epigastric region 04/01/2014    Nausea 04/01/2014    IBS (irritable bowel syndrome)     Anxiety disorder     Depression     Allergic rhinitis     HSV (herpes simplex virus) infection     SV[facility]ra[facility]ricular [facility]cardia) ([facility])      Current Outpatient Medications   Medication Sig Dispense Refill    melatonin 3 MG TABS tablet Take 3 mg by mouth nightly as needed      cetirizine (ZYRTEC) 10 MG tablet Take 10 mg by mouth daily      gabapentin (NEURONTIN) 300 MG capsule TAKE ONE CAPSULE BY MOUTH THREE TIMES A DAY 90 capsule 0    naproxen (NAPROSYN) 500 MG tablet TAKE ONE TABLET BY MOUTH TWICE A DAY WITH MEALS 60 tablet 2    Calcium Carbonate-Vitamin D (CALTRATE 600+D PO) Take by mouth      Multiple Vitamins-Minerals (THERAPEUTIC MULTIVITAMIN-MINERALS) tablet Take 1 tablet by mouth daily      omeprazole (PRILOSEC) 20 MG capsule Take 20 mg by mouth daily as needed       senna (SENOKOT) 8.6 MG tablet Take 1 tablet by mouth daily as needed       loratadine (CLARITIN) 10 MG tablet Take 10 mg by mouth daily as needed.  diphenhydrAMINE (BENADRYL) 25 MG tablet Take 25 mg by mouth nightly as needed. No current facility-administered medications for this visit. Allergies: Dye [iodides]; Cephalexin; Clindamycin/lincomycin cross reactors; Erythromycin; Nortriptyline; Paxil [paroxetine]; Pcn [penicillins]; Sertraline; Sulfa antibiotics; Tetracyclines & related; and Amoxicillin-pot clavulanate  Past Medical History:   Diagnosis Date    Allergic rhinitis     Anxiety disorder     Depression     HSV (herpes simplex virus) infection     IBS (irritable bowel syndrome)     SVT (supraventricular tachycardia) (HCC)     Stewart-Parkinson-White syndrome      Past Surgical History:   Procedure Laterality Date    APPENDECTOMY  1999    CHOLECYSTECTOMY      COLONOSCOPY  2020    Dr. Martina Shepherd    COLONOSCOPY N/A 3/5/2020    COLORECTAL CANCER SCREENING, NOT HIGH RISK performed by Romy Nelson MD at [place] [address]  16    with mesh    PARTIAL HYSTERECTOMY  1999    TONSILLECTOMY       Family History   Problem Relation Age of Onset    Heart Disease Father     Diabetes Father     Cancer Mother         uterine    Kidney Disease Paternal Aunt      Social History     Tobacco Use    Smoking status: Former Smoker     Last attempt to quit: 2008     Years since quittin.4    Smokeless tobacco: Never Used   Substance Use Topics    Alcohol use: Yes     Comment: social use       Review of Systems   Constitutional: Negative for chills, diaphoresis and fatigue. HENT: Negative for congestion, postnasal drip, rhinorrhea, sinus pressure, sinus pain and sore throat. Eyes: Positive for visual disturbance. Respiratory: Negative for apnea, cough, chest tightness, shortness of breath and wheezing.     Cardiovascular: Negative for chest pain and palpitations. Gastrointestinal: Negative for abdominal pain, blood in stool and constipation. Genitourinary: Negative for dysuria and frequency. Musculoskeletal: Negative for arthralgias and myalgias. Skin: Negative for rash. Neurological: Negative for dizziness, weakness, numbness and headaches. Hematological: Negative for adenopathy. Objective:   Physical Exam  Constitutional:       General: She is not in acute distress. Appearance: Normal appearance. She is not ill-appearing. HENT:      Head: Normocephalic and atraumatic. Right Ear: Tympanic membrane normal.      Left Ear: Tympanic membrane normal.   Eyes:      General:         Right eye: No discharge. Left eye: No discharge. Extraocular Movements: Extraocular movements intact. Pupils: Pupils are equal, round, and reactive to light. Comments: Cataract L eye   Neck:      Musculoskeletal: No neck rigidity. Cardiovascular:      Rate and Rhythm: Normal rate and regular rhythm. Heart sounds: No murmur. Pulmonary:      Effort: No respiratory distress. Abdominal:      General: There is no distension. Tenderness: There is no abdominal tenderness. There is no guarding. Lymphadenopathy:      Cervical: No cervical adenopathy. Skin:     Coloration: Skin is not jaundiced. Findings: No rash. Neurological:      General: No focal deficit present. Mental Status: She is alert. Psychiatric:         Mood and Affect: Mood normal.         Behavior: Behavior normal.         Thought Content: Thought content normal.         Judgment: Judgment normal.         Assessment:       Diagnosis Orders   1. Cortical age-related cataract of left eye  EKG 12 Lead   2. Gastroesophageal reflux disease without esophagitis     3. SVT (supraventricular tachycardia) ([address]     4.  Trigeminal neuralgia           Plan:      Chief Complaint   Patient presents with    Pre-op Exam     left cataract surgery with Dr. Beth Carlos Malissa Larios on 12/8/2020  fax: [phone]   EKG pending        [address] [place] [address],

## 2020-12-21 RX ORDER — GABAPENTIN 300 MG/1
CAPSULE ORAL
Qty: 90 CAPSULE | Refills: 0 | Status: SHIPPED | OUTPATIENT
Start: 2020-12-21 | End: 2021-01-20

## 2021-01-19 ENCOUNTER — TELEPHONE (OUTPATIENT)
Dept: FAMILY MEDICINE CLINIC | Age: 61
End: 2021-01-19

## 2021-01-19 NOTE — TELEPHONE ENCOUNTER
Pt is at work and pt is supposed to get Reviews42 vaccine  Today and she has allergies to shellfish and iodine and they are needing the ok from her PCP. She is needing to know this ASAP b/c they are waiting. Pl advise.   [phone]

## 2021-01-19 NOTE — TELEPHONE ENCOUNTER
She can get the vaccine  They will tell her if she can based on her allergies when she get there  I do not think it will be a problem

## 2021-01-20 RX ORDER — GABAPENTIN 300 MG/1
CAPSULE ORAL
Qty: 90 CAPSULE | Refills: 0 | Status: SHIPPED | OUTPATIENT
Start: 2021-01-20 | End: 2021-02-17

## 2021-02-17 RX ORDER — GABAPENTIN 300 MG/1
CAPSULE ORAL
Qty: 90 CAPSULE | Refills: 0 | Status: SHIPPED | OUTPATIENT
Start: 2021-02-17 | End: 2021-04-05

## 2021-02-24 ENCOUNTER — OFFICE VISIT (OUTPATIENT)
Dept: FAMILY MEDICINE CLINIC | Age: 61
End: 2021-02-24
Payer: COMMERCIAL

## 2021-02-24 VITALS
HEIGHT: 66 IN | SYSTOLIC BLOOD PRESSURE: 126 MMHG | WEIGHT: 179 LBS | TEMPERATURE: 97.9 F | DIASTOLIC BLOOD PRESSURE: 76 MMHG | BODY MASS INDEX: 28.77 KG/M2

## 2021-02-24 DIAGNOSIS — F41.9 ANXIETY: Primary | ICD-10-CM

## 2021-02-24 PROCEDURE — 99213 OFFICE O/P EST LOW 20 MIN: CPT | Performed by: INTERNAL MEDICINE

## 2021-02-24 SDOH — ECONOMIC STABILITY: INCOME INSECURITY: HOW HARD IS IT FOR YOU TO PAY FOR THE VERY BASICS LIKE FOOD, HOUSING, MEDICAL CARE, AND HEATING?: NOT HARD AT ALL

## 2021-02-24 SDOH — ECONOMIC STABILITY: TRANSPORTATION INSECURITY
IN THE PAST 12 MONTHS, HAS THE LACK OF TRANSPORTATION KEPT YOU FROM MEDICAL APPOINTMENTS OR FROM GETTING MEDICATIONS?: NO

## 2021-02-24 ASSESSMENT — ENCOUNTER SYMPTOMS
ABDOMINAL PAIN: 0
APNEA: 0
COUGH: 0

## 2021-02-24 NOTE — PROGRESS NOTES
Frandy Nation (:  1960) is a 64 y.o. female,Established patient, here for evaluation of the following chief complaint(s): Anxiety (patient c/o anxiety; increased stress \"for awhile\")  patient was on buspar she states that she can not take     ASSESSMENT/PLAN:   Diagnosis Orders   1.  Anxiety     will refer to NP  Patient is un able to tolerate multiple meds  SUBJECTIVE/OBJECTIVE:  HPI   Chief Complaint   Patient presents with    Anxiety     patient c/o anxiety; increased stress \"for awhile\"     Frandy Nation is a 64 y.o. female with the following history as recorded in EpicCare:  Patient Active Problem List    Diagnosis Date Noted    Trigger index finger of right hand 2018    Primary osteoarthritis of first carpometacarpal joint of right hand 2018    Pain of right thumb 2018    Pink eye disease of right eye 2018    Incisional hernia, without obstruction or gangrene     Neck pain 2016    Abdominal wall pain 2016    Skull mass 2015    Skin lesion of face 2014    Skin lesion 2014    Trigeminal neuralgia 2014    Change in vision 10/14/2014    Pharyngitis 2014    Left foot pain 2014    Chills 2014    GERD (gastroesophageal reflux disease) 2014    Abdominal wall pain in epigastric region 2014    Nausea 2014    IBS (irritable bowel syndrome)     Anxiety disorder     Depression     Allergic rhinitis     HSV (herpes simplex virus) infection     SVT (supraventricular tachycardia) ([facility])      Current Outpatient Medications   Medication Sig Dispense Refill    gabapentin (NEURONTIN) 300 MG capsule TAKE ONE CAPSULE BY MOUTH THREE TIMES A DAY 90 capsule 0    melatonin 3 MG TABS tablet Take 3 mg by mouth nightly as needed      cetirizine (ZYRTEC) 10 MG tablet Take 10 mg by mouth daily      naproxen (NAPROSYN) 500 MG tablet TAKE ONE TABLET BY MOUTH TWICE A DAY WITH MEALS 60 tablet 2  Calcium Carbonate-Vitamin D (CALTRATE 600+D PO) Take by mouth      Multiple Vitamins-Minerals (THERAPEUTIC MULTIVITAMIN-MINERALS) tablet Take 1 tablet by mouth daily      omeprazole (PRILOSEC) 20 MG capsule Take 20 mg by mouth daily as needed       senna (SENOKOT) 8.6 MG tablet Take 1 tablet by mouth daily as needed       loratadine (CLARITIN) 10 MG tablet Take 10 mg by mouth daily as needed.  diphenhydrAMINE (BENADRYL) 25 MG tablet Take 25 mg by mouth nightly as needed. No current facility-administered medications for this visit.       Allergies: Dye [iodides], Cephalexin, Clindamycin/lincomycin cross reactors, Erythromycin, Nortriptyline, Paxil [paroxetine], Pcn [penicillins], Sertraline, Sulfa antibiotics, Tetracyclines & related, and Amoxicillin-pot clavulanate  Past Medical History:   Diagnosis Date    Allergic rhinitis     Anxiety disorder     Depression     HSV (herpes simplex virus) infection     IBS (irritable bowel syndrome)     SVT (supraventricular tachycardia) (HCC)     Stewart-Parkinson-White syndrome      Past Surgical History:   Procedure Laterality Date    APPENDECTOMY  1999    CHOLECYSTECTOMY      COLONOSCOPY  2020    Dr[facility] Salguero [facility]    COLONOSCOPY N/A 3/5/2020    COLORECTAL CANCER SCREENING, NOT HIGH RISK performed by Shiva Charles MD at [place] [id number]  16    with mesh    PARTIAL HYSTERECTOMY  1999    TONSILLECTOMY       Family History   Problem Relation Age of Onset    Heart Disease Father     Diabetes Father     Cancer Mother         uterine    Kidney Disease Paternal Aunt      Social History     Tobacco Use    Smoking status: Former Smoker     Quit date: 2008     Years since quittin.6    Smokeless tobacco: Never Used   Substance Use Topics    Alcohol use: Yes     Comment: social use       Review of Systems Constitutional: Negative for chills, diaphoresis and fatigue. HENT: Negative for congestion and nosebleeds. Eyes: Negative for visual disturbance. Respiratory: Negative for apnea and cough. Cardiovascular: Negative for chest pain and palpitations. Gastrointestinal: Negative for abdominal pain. Genitourinary: Negative for frequency. Psychiatric/Behavioral:        Patient presents with: Anxiety: patient c/o anxiety; increased stress \"for awhile\". patient is very tearful         Physical Exam  Vitals signs and nursing note reviewed. Constitutional:       Appearance: Normal appearance. Cardiovascular:      Rate and Rhythm: Normal rate and regular rhythm. Heart sounds: No murmur. Pulmonary:      Effort: No respiratory distress. Breath sounds: No wheezing. Abdominal:      General: There is no distension. Tenderness: There is no abdominal tenderness. There is no guarding. Skin:     Coloration: Skin is not jaundiced. Findings: No rash. Neurological:      General: No focal deficit present. Mental Status: She is alert and oriented to person, place, and time. Psychiatric:         Mood and Affect: Mood normal.         Thought Content:  Thought content normal.                 An electronic signature was used to authenticate this note.    --Chalmer Better, DO
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
## 2021-03-03 ENCOUNTER — OFFICE VISIT (OUTPATIENT)
Dept: PSYCHIATRY | Age: 61
End: 2021-03-03
Payer: COMMERCIAL

## 2021-03-03 DIAGNOSIS — F41.1 GAD (GENERALIZED ANXIETY DISORDER): ICD-10-CM

## 2021-03-03 DIAGNOSIS — G47.9 SLEEP DIFFICULTIES: ICD-10-CM

## 2021-03-03 DIAGNOSIS — F33.2 SEVERE EPISODE OF RECURRENT MAJOR DEPRESSIVE DISORDER, WITHOUT PSYCHOTIC FEATURES (HCC): Primary | ICD-10-CM

## 2021-03-03 PROCEDURE — 99205 OFFICE O/P NEW HI 60 MIN: CPT | Performed by: NURSE PRACTITIONER

## 2021-03-03 RX ORDER — HYDROXYZINE HYDROCHLORIDE 25 MG/1
25 TABLET, FILM COATED ORAL 3 TIMES DAILY PRN
Qty: 90 TABLET | Refills: 0 | Status: SHIPPED | OUTPATIENT
Start: 2021-03-03 | End: 2021-04-02

## 2021-03-03 ASSESSMENT — PATIENT HEALTH QUESTIONNAIRE - PHQ9
SUM OF ALL RESPONSES TO PHQ QUESTIONS 1-9: 20
SUM OF ALL RESPONSES TO PHQ QUESTIONS 1-9: 20
10. IF YOU CHECKED OFF ANY PROBLEMS, HOW DIFFICULT HAVE THESE PROBLEMS MADE IT FOR YOU TO DO YOUR WORK, TAKE CARE OF THINGS AT HOME, OR GET ALONG WITH OTHER PEOPLE: 3
4. FEELING TIRED OR HAVING LITTLE ENERGY: 2
SUM OF ALL RESPONSES TO PHQ QUESTIONS 1-9: 20
2. FEELING DOWN, DEPRESSED OR HOPELESS: 3

## 2021-03-03 ASSESSMENT — ANXIETY QUESTIONNAIRES
5. BEING SO RESTLESS THAT IT IS HARD TO SIT STILL: 0-NOT AT ALL
2. NOT BEING ABLE TO STOP OR CONTROL WORRYING: 2-OVER HALF THE DAYS
1. FEELING NERVOUS, ANXIOUS, OR ON EDGE: 3-NEARLY EVERY DAY
7. FEELING AFRAID AS IF SOMETHING AWFUL MIGHT HAPPEN: 0-NOT AT ALL

## 2021-03-03 NOTE — PATIENT INSTRUCTIONS
Mobile Crisis, Emergency Psychiatric Clinic for patients in need of medication and or a Psychiatrist, temporarily. [place] [address] [address], [id number]. ([facility]). [phone]      [address] [place] [address]   [phone]  [phone])    National Suicide Prevention Lifeline  (500) 522-TALK (7154)  www.suicidepreventionlifeline. Magdalene Horne [address] (PES): [phone]  [address] [place] [address]) provides authorization for Crisis Stabilization services in [facility] for both Adults and Children.   Phone: [phone]  Fax: [phone]  [address]    Mobile Crisis Team: [phone]    Text Support  Text 405969 \"connect\" if suicidal for contact via text or phone call

## 2021-03-23 ENCOUNTER — HOSPITAL ENCOUNTER (OUTPATIENT)
Dept: WOMENS IMAGING | Age: 61
Discharge: HOME | End: 2021-03-23
Payer: COMMERCIAL

## 2021-03-23 DIAGNOSIS — Z12.31 VISIT FOR SCREENING MAMMOGRAM: ICD-10-CM

## 2021-03-23 PROCEDURE — 77067 SCR MAMMO BI INCL CAD: CPT

## 2021-04-05 RX ORDER — GABAPENTIN 300 MG/1
CAPSULE ORAL
Qty: 90 CAPSULE | Refills: 0 | Status: SHIPPED | OUTPATIENT
Start: 2021-04-05 | End: 2021-05-03

## 2021-04-06 ENCOUNTER — TELEPHONE (OUTPATIENT)
Dept: FAMILY MEDICINE CLINIC | Age: 61
End: 2021-04-06

## 2021-04-06 NOTE — TELEPHONE ENCOUNTER
Please schedule a follow up visit with her. I have her results from [employer] testing as well to discuss and we can look into meds.

## 2021-04-06 NOTE — TELEPHONE ENCOUNTER
Pt stated that it was mentioned that she should be put on something, pt is getting angry real fast lately, she is being nasty to people and she doesn't want to be like that.     Gee Silva

## 2021-04-19 ENCOUNTER — ANESTHESIA EVENT (OUTPATIENT)
Dept: OPERATING ROOM | Age: 61
End: 2021-04-19
Payer: COMMERCIAL

## 2021-04-19 ENCOUNTER — OFFICE VISIT (OUTPATIENT)
Dept: PRIMARY CARE CLINIC | Age: 61
End: 2021-04-19
Payer: COMMERCIAL

## 2021-04-19 DIAGNOSIS — Z20.828 EXPOSURE TO SARS-ASSOCIATED CORONAVIRUS: Primary | ICD-10-CM

## 2021-04-19 PROCEDURE — 99211 OFF/OP EST MAY X REQ PHY/QHP: CPT | Performed by: NURSE PRACTITIONER

## 2021-04-19 NOTE — PATIENT INSTRUCTIONS
You have received a viral test for COVID-19. Below is education on quarantine per the CDC guidelines. For any symptoms, seek care from your PCP, call [phone] to establish care with a doctor, or go directly to an urgent care or the emergency room. Test results will take 2-7 days and will be sent to you in your [employer] account. If you test positive, you will be contacted via phone. If you test negative, the ONLY communication will be through [address] GO TO [employer] AND SIGN UP FOR [employer]  (LOWER LEFT OF THE HOME PAGE)  No test is 100%. If you have symptoms, you should follow the guidance of quarantine as previously stated. You can still be contagious if you have symptoms. Your [facility] will reach out to you if you have a positive result. They will provide you with a return to work date and note. If you were tested for a pre-op, then you should remain in quarantine until your procedure. How do I know if I need to be in quarantine? If you live in a community where COVID-19 is or might be spreading (currently, that is virtually everywhere in the [place])  Be alert for symptoms. Watch for fever, cough, shortness of breath, or other symptoms of COVID-19.  Take your temperature if symptoms develop.  Practice social distancing. Maintain 6 feet of distance from others and stay out of crowded places.  Follow CDC guidance if symptoms develop. If you feel healthy but:   Recently had close contact with a person with COVID-19 you need to Quarantine:   Stay home until 14 days after your last exposure.  Check your temperature twice a day and watch for symptoms of COVID-19.  If possible, stay away from people who are at higher-risk for getting very sick from COVID-19.   Stay Home and Monitor Your Health if you:   Have been diagnosed with COVID-19, or   Are waiting for test results, or   Have cough, fever, or shortness of breath, or symptoms of COVID-19      When You Can

## 2021-04-19 NOTE — PROGRESS NOTES
Migdalia Orellana received a viral test for COVID-19. They were educated on isolation and quarantine as appropriate. For any symptoms, they were directed to seek care from their PCP, given contact information to establish with a doctor, directed to an urgent care or the emergency room.

## 2021-04-20 ENCOUNTER — OFFICE VISIT (OUTPATIENT)
Dept: PSYCHIATRY | Age: 61
End: 2021-04-20
Payer: COMMERCIAL

## 2021-04-20 ENCOUNTER — OFFICE VISIT (OUTPATIENT)
Dept: FAMILY MEDICINE CLINIC | Age: 61
End: 2021-04-20
Payer: COMMERCIAL

## 2021-04-20 ENCOUNTER — HOSPITAL ENCOUNTER (OUTPATIENT)
Age: 61
Discharge: HOME | End: 2021-04-20
Payer: COMMERCIAL

## 2021-04-20 VITALS
TEMPERATURE: 97.9 F | BODY MASS INDEX: 30.05 KG/M2 | WEIGHT: 187 LBS | DIASTOLIC BLOOD PRESSURE: 82 MMHG | SYSTOLIC BLOOD PRESSURE: 122 MMHG | HEIGHT: 66 IN

## 2021-04-20 DIAGNOSIS — M67.40 GANGLION CYST: ICD-10-CM

## 2021-04-20 DIAGNOSIS — G47.9 SLEEP DIFFICULTIES: ICD-10-CM

## 2021-04-20 DIAGNOSIS — I47.1 SVT (SUPRAVENTRICULAR TACHYCARDIA) (HCC): ICD-10-CM

## 2021-04-20 DIAGNOSIS — F33.2 SEVERE EPISODE OF RECURRENT MAJOR DEPRESSIVE DISORDER, WITHOUT PSYCHOTIC FEATURES (HCC): Primary | ICD-10-CM

## 2021-04-20 DIAGNOSIS — M67.40 GANGLION CYST: Primary | ICD-10-CM

## 2021-04-20 DIAGNOSIS — K21.9 GASTROESOPHAGEAL REFLUX DISEASE WITHOUT ESOPHAGITIS: ICD-10-CM

## 2021-04-20 DIAGNOSIS — F41.1 GAD (GENERALIZED ANXIETY DISORDER): ICD-10-CM

## 2021-04-20 DIAGNOSIS — F41.1 GENERALIZED ANXIETY DISORDER: ICD-10-CM

## 2021-04-20 LAB
EKG ATRIAL RATE: 64 BPM
EKG DIAGNOSIS: NORMAL
EKG P AXIS: 35 DEGREES
EKG P-R INTERVAL: 142 MS
EKG Q-T INTERVAL: 392 MS
EKG QRS DURATION: 86 MS
EKG QTC CALCULATION (BAZETT): 404 MS
EKG R AXIS: 37 DEGREES
EKG T AXIS: 50 DEGREES
EKG VENTRICULAR RATE: 64 BPM
SARS-COV-2: NOT DETECTED

## 2021-04-20 PROCEDURE — 99242 OFF/OP CONSLTJ NEW/EST SF 20: CPT | Performed by: INTERNAL MEDICINE

## 2021-04-20 PROCEDURE — 93005 ELECTROCARDIOGRAM TRACING: CPT

## 2021-04-20 PROCEDURE — 99213 OFFICE O/P EST LOW 20 MIN: CPT | Performed by: NURSE PRACTITIONER

## 2021-04-20 PROCEDURE — 93010 ELECTROCARDIOGRAM REPORT: CPT | Performed by: INTERNAL MEDICINE

## 2021-04-20 RX ORDER — HYDROXYZINE HYDROCHLORIDE 25 MG/1
25 TABLET, FILM COATED ORAL 3 TIMES DAILY PRN
COMMUNITY
End: 2021-05-26 | Stop reason: SDUPTHER

## 2021-04-20 RX ORDER — QUETIAPINE FUMARATE 25 MG/1
TABLET, FILM COATED ORAL
Qty: 60 TABLET | Refills: 3 | Status: SHIPPED | OUTPATIENT
Start: 2021-04-20 | End: 2021-08-16

## 2021-04-20 ASSESSMENT — ANXIETY QUESTIONNAIRES
GAD7 TOTAL SCORE: 13
1. FEELING NERVOUS, ANXIOUS, OR ON EDGE: 3-NEARLY EVERY DAY
3. WORRYING TOO MUCH ABOUT DIFFERENT THINGS: 3-NEARLY EVERY DAY
5. BEING SO RESTLESS THAT IT IS HARD TO SIT STILL: 1-SEVERAL DAYS
4. TROUBLE RELAXING: 2-OVER HALF THE DAYS

## 2021-04-20 ASSESSMENT — ENCOUNTER SYMPTOMS
RHINORRHEA: 0
WHEEZING: 0
COUGH: 0
DIARRHEA: 0
SORE THROAT: 0
SHORTNESS OF BREATH: 0
SINUS PAIN: 0
APNEA: 0
CONSTIPATION: 0
SINUS PRESSURE: 0
BLOOD IN STOOL: 0
ABDOMINAL PAIN: 0
NAUSEA: 0
VOMITING: 0

## 2021-04-20 ASSESSMENT — PATIENT HEALTH QUESTIONNAIRE - PHQ9
2. FEELING DOWN, DEPRESSED OR HOPELESS: 1
SUM OF ALL RESPONSES TO PHQ QUESTIONS 1-9: 16
9. THOUGHTS THAT YOU WOULD BE BETTER OFF DEAD, OR OF HURTING YOURSELF: 0
4. FEELING TIRED OR HAVING LITTLE ENERGY: 3
8. MOVING OR SPEAKING SO SLOWLY THAT OTHER PEOPLE COULD HAVE NOTICED. OR THE OPPOSITE, BEING SO FIGETY OR RESTLESS THAT YOU HAVE BEEN MOVING AROUND A LOT MORE THAN USUAL: 0

## 2021-04-20 NOTE — PROGRESS NOTES
PSYCHIATRY PROGRESS NOTE    Channing Avelar  1960 04/20/21      CC:   Chief Complaint   Patient presents with    Depression    Anxiety    Follow-up       HPI:   Channing Avelar is a 64 y.o. female with h/o anxiety and depression who p/t clinic for follow up. Since initial evaluation, patient has had worsening of previous complaints of anxiety, depression. Ruminating complaints today of increased irritability, anger, outbursts. Feels that she is on edge a lot. Feels like her mind is racing and cannot slow down. Temperament the same as previous visit. Sleep continues to be an issues. Sleeps some nights, then up all night other nights. Mood impacted by all of this.  stating that she is just angry and needs help. Unsure what is provoking it, if anything. Son is currently in rehab. She was going to go see him but decided not too. He has just caused too many family issues. Does not want him to move back here at all. Will be anxiety provoking and worries for her grandkids. Work continues to be extremely stressful. Having foot surgery on Friday and will be off work for 6+ weeks and she is overall excited about this. Experiencing a lot of foot pain with this. Psych ROS:     Depression: Continues to rate mood 4-5/10 (10 best). Sleep issues continue. No longer taking Tylenol PM. Overeating. Concentration awful. Continues to experience g/h/h. Poor energy. Less tearful but more angry. Denies SI. Historically rates 4-5/10 (10 best); takes Benadryl/melatonin/tylenol PM to falls asleep and stay asleep. Appetite is poor, overeating and emotionally eating. Concentration/focus is awful. + guilt, hopelessness, helplessness with the situation with her son. Some days she has difficulty with ADL completion, will go a couple of days. Self esteem \"never really thought about it. \" Loss of interest in hiking, outdoors, [place]. Poor energy. Increased being tearful. Tries not to isolate, but sometimes. avoids or dislikes tasks requiring sustained mental effort    + fails to give close attention to details        PTSD: Denies nightmares, flashbacks, hypervigilance, easily startled, decreased sleep, reliving the event, avoiding situations that remind you of trauma, physical and mental paralysis when reminded of the experience, same despair, easily angry or irritable, trouble concentrating, fear for safety,  Numbness of emotions, feeling of detachment     Eating disorders: Denies    LUIZ 7 SCORE 4/20/2021 3/3/2021   LUIZ-7 Total Score 13 13     Interpretation of LUIZ-7 score: 5-9 = mild anxiety, 10-14 = moderate anxiety,   15+ = severe anxiety. Recommend referral to behavioral health for scores 10 or greater. PHQ-9 Total Score: 16 (4/20/2021  2:21 PM)  Thoughts that you would be better off dead, or of hurting yourself in some way: 0 (4/20/2021  2:21 PM)    Interpretation of PHQ-9 score:  1-4 = minimal depression, 5-9 = mild depression,   10-14 = moderate depression; 15-19 = moderately severe depression, 20-27 = severe depression      Past Psychiatric History:               Prior hospitalizations: Denies              Prior diagnoses: Depression              Outpatient Treatment: See below                          Psychiatrist: Denies                          Therapist: 5 years ago.                Suicide Attempts: Denies              Hx SH:  Denies     Past Psychopharmacologic Trials (including response/reactions):  Buspar- light headedness, dizzy  Zoloft- rash  Paxil- hallucinations  Nortriptyline  Xanax- rash  Valium-rash  Wellbutrin       Past Medical/Surgical History:   Past Medical History:   Diagnosis Date    Allergic rhinitis     Anxiety disorder     Depression     HSV (herpes simplex virus) infection     IBS (irritable bowel syndrome)     SVT (supraventricular tachycardia) (HCC)     Stewart-Parkinson-White syndrome      Past Surgical History:   Procedure Laterality Date    APPENDECTOMY  07/05/1999    CHOLECYSTECTOMY      COLONOSCOPY  03/2020    [facility]    COLONOSCOPY N/A 3/5/2020    COLORECTAL CANCER SCREENING, NOT HIGH RISK performed by Joseluis Barfield MD at [place] [address]  6-8-16    with mesh    PARTIAL HYSTERECTOMY  07/16/1999    TONSILLECTOMY         Family History   Problem Relation Age of Onset    Heart Disease Father     Diabetes Father     Cancer Mother         uterine    Kidney Disease Paternal Aunt          PCP: Huey TELLEZ,       Allergies: Allergies   Allergen Reactions    Dye [Iodides] Hives and Shortness Of Breath    Cephalexin     Clindamycin/Lincomycin Cross Reactors     Erythromycin     Nortriptyline     Paxil [Paroxetine]     Pcn [Penicillins]     Sertraline     Sulfa Antibiotics     Tetracyclines & Related     Amoxicillin-Pot Clavulanate Rash         Current Medications:   Current Outpatient Medications on File Prior to Visit   Medication Sig Dispense Refill    hydrOXYzine (ATARAX) 25 MG tablet Take 25 mg by mouth 3 times daily as needed for Anxiety      gabapentin (NEURONTIN) 300 MG capsule TAKE ONE CAPSULE BY MOUTH THREE TIMES A DAY 90 capsule 0    melatonin 3 MG TABS tablet Take 3 mg by mouth nightly as needed      cetirizine (ZYRTEC) 10 MG tablet Take 10 mg by mouth daily      naproxen (NAPROSYN) 500 MG tablet TAKE ONE TABLET BY MOUTH TWICE A DAY WITH MEALS 60 tablet 2    Calcium Carbonate-Vitamin D (CALTRATE 600+D PO) Take by mouth      Multiple Vitamins-Minerals (THERAPEUTIC MULTIVITAMIN-MINERALS) tablet Take 1 tablet by mouth daily      omeprazole (PRILOSEC) 20 MG capsule Take 20 mg by mouth daily as needed       senna (SENOKOT) 8.6 MG tablet Take 1 tablet by mouth daily as needed       loratadine (CLARITIN) 10 MG tablet Take 10 mg by mouth daily as needed.  diphenhydrAMINE (BENADRYL) 25 MG tablet Take 25 mg by mouth nightly as needed.        No current facility-administered 04/20/2021 35     R Axis 04/20/2021 37     T Axis 04/20/2021 50     Diagnosis 04/20/2021 Normal sinus rhythmNormal ECGWhen compared with ECG of 30-NOV-2020 13:43,No significant change was foundConfirmed by Mj ESPINOZA MD ([id number]) on 4/20/2021 1:14:31 PM    Office Visit on 04/19/2021   Component Date Value    SARS-CoV-2 04/19/2021 Not Detected    Hospital Outpatient Visit on 11/30/2020   Component Date Value    Ventricular Rate 11/30/2020 75     Atrial Rate 11/30/2020 75     P-R Interval 11/30/2020 144     QRS Duration 11/30/2020 86     Q-T Interval 11/30/2020 384     QTc Calculation (Bazett) 11/30/2020 428     P Axis 11/30/2020 64     R Axis 11/30/2020 50     T Axis 11/30/2020 50     Diagnosis 11/30/2020 Normal sinus rhythm with sinus arrhythmiaNormal ECGWhen compared with ECG of 08-JUN-2016 08:31,No significant change was foundConfirmed by [facility] Bhupinder KEN ([id number]) on 11/30/2020 5:06:36 PM        Last Drug screen:  No results found for: [place], LABBENZ, COCAIMETSCRU, THC, MDMA, LABMETH, OPIATESCREENURINE, OXTCOSU, PHENCYCLIDINESCREENURINE, PROPOXYPHENE, METAMPU        Imaging: no head imaging on file      ASSESSMENT AND PLAN     Diagnosis Orders   1. Severe episode of recurrent major depressive disorder, without psychotic features ([facility] [address]  QUEtiapine (SEROQUEL) 25 MG tablet   2. LUIZ (generalized anxiety disorder)  QUEtiapine (SEROQUEL) 25 MG tablet   3. Sleep difficulties  QUEtiapine (SEROQUEL) 25 MG tablet     4. RULE OUT MOOD DISORDER       1. Safety: NO Imminent risk of danger to/self/others based on the factors considered below. Appropriate for outpatient level of care.   Safety plan includes: 911, PES, hotlines, and interventions discussed today.      Risk factors: Age <25 or >55,  depressed mood, suicidal ideation, suicidal plan, access to lethal means, prior suicide attempt, family h/o completed suicide, substance abuse, chronic pain or medical illness, social isolation, history of violence, active psychosis, cognitive impairment, no outpatient services in place, medication noncompliance, and no collateral information to support safety.     Protective factors:female gender, denies suicidal ideation, does not have lethal plan,no prior suicide attempts, no family h/o suicide, no substance abuse, patient has social or family support, no active psychosis or cognitive dysfunction, physically healthy,compliant with recommended medications, and patient is future oriented.        2. Psychiatric Plan     MDD, severe, without psychotic features/LUIZ: Patient struggling with severe depressive sx related to issues she has had to deal with over the years. Issues with son have heightened her depression and anxiety. April and May worse months as she is triggered.     -Continue Hydroxyzine 25 mg TID for anxiety and sleep. Side effect discussed. Beneficial to patient.     - ADD Seroquel 25 mg 1-2 tablets nightly for sleep/mood issues/MDD augmentation. Will monitor metabolic side effects with this. Talked extensively with patient about this.    -Consider Buspar in future. On before, did not like but was started at 30 mg. Too high? Could be beneficial.      -Labs: reviewed in [facility] therapy. Declines at this time.     -OARRS reviewed, c/w history  -R/b/se/a d/w pt who consents.     3. Medical  -Following with Mely Martinez DO     4. Substance   -No active issues.     5. RTC - 4 weeks  Georganna Morning, [address], [place] Nurse Practitioner    On this date 4/20/2021 I have spent 25 minutes reviewing previous notes, test results and face to face with the patient discussing the diagnosis and importance of compliance with the treatment plan as well as documenting on the day of the visit.

## 2021-04-20 NOTE — PATIENT INSTRUCTIONS
Mobile Crisis, Emergency Psychiatric Clinic for patients in need of medication and or a Psychiatrist, temporarily. [facility] [place], [address]. ([facility]). [phone]      [address] [place] [address] [phone]  [phone])    National Suicide Prevention Lifeline  (572) 273-TALK (9414)  www.suicidepreventionlifeline. [address] (PES): [phone]  [address] [place] [address]) provides authorization for Crisis Stabilization services in [facility] for both Adults and Children.   Phone: [phone]  Fax: [phone]  [address]    Mobile Crisis Team: [phone]    Text Support  Text 992930 \"connect\" if suicidal for contact via text or phone call

## 2021-04-20 NOTE — PATIENT INSTRUCTIONS
Thank you for choosing [facility]. Please bring a current list of medications to every appointment. Please contact your pharmacy for any prescription refill(s) that you are requesting.

## 2021-04-20 NOTE — PROGRESS NOTES
Dr. Baljeet Junior office called to fax new antiobiotic order due to patient is allergic to cephlasporins and ceclor

## 2021-04-20 NOTE — PROGRESS NOTES
tablet Take 1 tablet by mouth daily      omeprazole (PRILOSEC) 20 MG capsule Take 20 mg by mouth daily as needed       senna (SENOKOT) 8.6 MG tablet Take 1 tablet by mouth daily as needed       loratadine (CLARITIN) 10 MG tablet Take 10 mg by mouth daily as needed.  diphenhydrAMINE (BENADRYL) 25 MG tablet Take 25 mg by mouth nightly as needed. No current facility-administered medications for this visit.       Allergies: Dye [iodides], Cephalexin, Clindamycin/lincomycin cross reactors, Erythromycin, Nortriptyline, Paxil [paroxetine], Pcn [penicillins], Sertraline, Sulfa antibiotics, Tetracyclines & related, and Amoxicillin-pot clavulanate  Past Medical History:   Diagnosis Date    Allergic rhinitis     Anxiety disorder     Depression     HSV (herpes simplex virus) infection     IBS (irritable bowel syndrome)     SVT (supraventricular tachycardia) (HCC)     Stewart-Parkinson-White syndrome      Past Surgical History:   Procedure Laterality Date    APPENDECTOMY  1999    CHOLECYSTECTOMY      COLONOSCOPY  2020    Dr. Demarco Canela    COLONOSCOPY N/A 3/5/2020    COLORECTAL CANCER SCREENING, NOT HIGH RISK performed by Rafia Flores MD at [place] [address]  16    with mesh    PARTIAL HYSTERECTOMY  1999    TONSILLECTOMY       Family History   Problem Relation Age of Onset    Heart Disease Father     Diabetes Father     Cancer Mother         uterine    Kidney Disease Paternal Aunt      Social History     Tobacco Use    Smoking status: Former Smoker     Quit date: 2008     Years since quittin.8    Smokeless tobacco: Never Used   Substance Use Topics    Alcohol use: Yes     Comment: social use     Patient Active Problem List   Diagnosis    IBS (irritable bowel syndrome)    Anxiety disorder    Depression    Allergic rhinitis    HSV (herpes simplex virus) infection    SVT (supraventricular tachycardia) ([address]    GERD (gastroesophageal reflux disease)    Abdominal wall pain in epigastric region    Nausea    Pharyngitis    Left foot pain    Chills    Change in vision    Trigeminal neuralgia    Skin lesion of face    Skin lesion    Skull mass    Neck pain    Abdominal wall pain    Incisional hernia, without obstruction or gangrene    Pain of right thumb    Pink eye disease of right eye    Trigger index finger of right hand    Primary osteoarthritis of first carpometacarpal joint of right hand       Review of Systems   Constitutional: Negative for chills, diaphoresis, fatigue and fever. HENT: Negative for congestion, postnasal drip, rhinorrhea, sinus pressure, sinus pain and sore throat. Eyes: Negative for visual disturbance. Respiratory: Negative for apnea, cough, shortness of breath and wheezing. Cardiovascular: Negative for chest pain and palpitations. Gastrointestinal: Negative for abdominal pain, blood in stool, constipation, diarrhea, nausea and vomiting. Endocrine: Negative for polyuria. Genitourinary: Negative for dysuria, frequency, hematuria and urgency. Musculoskeletal: Negative for arthralgias and myalgias. Skin: Negative for rash. Neurological: Positive for numbness. Negative for dizziness, syncope, weakness and headaches. Numbness R foot   Hematological: Negative for adenopathy. Prior to Visit Medications    Medication Sig Taking?  Authorizing Provider   hydrOXYzine (ATARAX) 25 MG tablet Take 25 mg by mouth 3 times daily as needed for Anxiety Yes Historical Provider, MD   gabapentin (NEURONTIN) 300 MG capsule TAKE ONE CAPSULE BY MOUTH THREE TIMES A DAY Yes Dahiana Jarrell, DO   melatonin 3 MG TABS tablet Take 3 mg by mouth nightly as needed Yes Historical Provider, MD   cetirizine (ZYRTEC) 10 MG tablet Take 10 mg by mouth daily Yes Historical Provider, MD   naproxen (NAPROSYN) 500 MG tablet TAKE ONE TABLET BY MOUTH TWICE A DAY WITH MEALS Yes Kaila Suh resource strain: Not hard at all   [employer] insecurity     Worry: Never true     Inability: Never true    Transportation needs     Medical: No     Non-medical: No   Tobacco Use    Smoking status: Former Smoker     Quit date: 2008     Years since quittin.8    Smokeless tobacco: Never Used   Substance and Sexual Activity    Alcohol use: Yes     Comment: social use    Drug use: No    Sexual activity: Not on file   Lifestyle    Physical activity     Days per week: Not on file     Minutes per session: Not on file    Stress: Not on file   Relationships    Social connections     Talks on phone: Not on file     Gets together: Not on file     Attends Spiritism service: Not on file     Active member of club or organization: Not on file     Attends meetings of clubs or organizations: Not on file     Relationship status: Not on file    Intimate partner violence     Fear of current or ex partner: Not on file     Emotionally abused: Not on file     Physically abused: Not on file     Forced sexual activity: Not on file   Other Topics Concern    Not on file   Social History Narrative    Not on file        Family History   Problem Relation Age of Onset    Heart Disease Father     Diabetes Father     Cancer Mother         uterine    Kidney Disease Paternal Aunt        ADVANCE DIRECTIVE: N, <no information>    Vitals:    21 0940   Temp: 97.9 °F (36.6 °C)   TempSrc: Temporal   Weight: 187 lb (84.8 kg)   Height: 5' 6\" (1.676 m)     Estimated body mass index is 30.18 kg/m² as calculated from the following:    Height as of this encounter: 5' 6\" (1.676 m). Weight as of this encounter: 187 lb (84.8 kg). Physical Exam  Constitutional:       Appearance: Normal appearance. She is normal weight. HENT:      Head: Normocephalic and atraumatic.       Right Ear: Tympanic membrane, ear canal and external ear normal.      Left Ear: Tympanic membrane, ear canal and external ear normal.   Eyes:      General: No scleral icterus. Right eye: No discharge. Left eye: No discharge. Extraocular Movements: Extraocular movements intact. Pupils: Pupils are equal, round, and reactive to light. Neck:      Musculoskeletal: No neck rigidity. Cardiovascular:      Rate and Rhythm: Normal rate and regular rhythm. Heart sounds: No murmur. Pulmonary:      Effort: No respiratory distress. Breath sounds: No wheezing. Abdominal:      General: There is no distension. Palpations: There is no mass. Tenderness: There is no abdominal tenderness. There is no guarding or rebound. Musculoskeletal:         General: No swelling or deformity. Lymphadenopathy:      Cervical: No cervical adenopathy. Skin:     Coloration: Skin is not jaundiced. Findings: No rash. Comments: No ganglion cyst noted at this time . Patient states that is is episodic has had it drained multiple times in the past .   Neurological:      General: No focal deficit present. Mental Status: She is alert and oriented to person, place, and time. Cranial Nerves: No cranial nerve deficit. Motor: No weakness. Psychiatric:         Mood and Affect: Mood normal.         Behavior: Behavior normal.         Thought Content: Thought content normal.         Judgment: Judgment normal.         No flowsheet data found.     Lab Results   Component Value Date    CHOL 217 03/27/2017    TRIG 106 03/27/2017    HDL 58 03/27/2017    LDLCALC 138 03/27/2017    GLUCOSE 99 07/10/2020       The ASCVD Risk score (Sapphire Capone., et al., 2013) failed to calculate for the following reasons:    Cannot find a previous HDL lab    Cannot find a previous total cholesterol lab    Immunization History   Administered Date(s) Administered    Influenza Virus Vaccine 09/28/2019    Influenza, MDCK Quadv, IM, PF (Flucelvax 4 yrs and older) 10/07/2020    Influenza, MDCK Quadv, with preserv IM (Flucelvax 4 yrs and older) 09/28/2019       Health

## 2021-04-20 NOTE — PROGRESS NOTES
[address]  time___1pm_________        Surgery time____2;15 pm________    Take the following medications with a sip of water: Follow your MD/Surgeons pre-procedure instructions regarding your medications    Do not eat or drink anything after 12:00 midnight prior to your surgery. This includes water chewing gum, mints and ice chips. You may brush your teeth and gargle the morning of your surgery, but do not swallow the water     Please see your family doctor/pediatrician for a history and physical and/or concerning medications. Bring any test results/reports from your physicians office. If you are under the care of a heart doctor or specialist doctor, please be aware that you may be asked to them for clearance    You may be asked to stop blood thinners such as Coumadin, Plavix, Fragmin, Lovenox, etc., or any anti-inflammatories such as:  Aspirin, Ibuprofen, Advil, Naproxen prior to your surgery. We also ask that you stop any OTC medications such as fish oil, vitamin E, glucosamine, garlic, Multivitamins, COQ 10, etc. May take tylenol    We ask that you do not smoke 24 hours prior to surgery  We ask that you do not  drink any alcoholic beverages 24 hours prior to surgery     You must make arrangements for a responsible adult to take you home after your surgery. For your safety you will not be allowed to leave alone or drive yourself home. Your surgery will be cancelled if you do not have a ride home. Also for your safety, it is strongly suggested that someone stay with you the first 24 hours after your surgery. A parent or legal guardian must accompany a child scheduled for surgery and plan to stay at the hospital until the child is discharged. Please do not bring other children with you. For your comfort, please wear simple loose fitting clothing to the hospital.  Please do not bring valuables.     Do not wear any make-up or nail polish on surgery. Remember. Kenan Mariano Safety First! Call before you Fall Remember

## 2021-04-23 ENCOUNTER — HOSPITAL ENCOUNTER (OUTPATIENT)
Age: 61
Setting detail: OUTPATIENT SURGERY
Discharge: HOME | End: 2021-04-23
Attending: PODIATRIST | Admitting: PODIATRIST
Payer: COMMERCIAL

## 2021-04-23 ENCOUNTER — ANESTHESIA (OUTPATIENT)
Dept: OPERATING ROOM | Age: 61
End: 2021-04-23
Payer: COMMERCIAL

## 2021-04-23 VITALS
RESPIRATION RATE: 16 BRPM | HEART RATE: 66 BPM | DIASTOLIC BLOOD PRESSURE: 76 MMHG | SYSTOLIC BLOOD PRESSURE: 118 MMHG | WEIGHT: 180 LBS | TEMPERATURE: 97 F | OXYGEN SATURATION: 96 % | HEIGHT: 66 IN | BODY MASS INDEX: 28.93 KG/M2

## 2021-04-23 VITALS — DIASTOLIC BLOOD PRESSURE: 48 MMHG | OXYGEN SATURATION: 96 % | SYSTOLIC BLOOD PRESSURE: 85 MMHG

## 2021-04-23 DIAGNOSIS — G89.18 POST-OPERATIVE PAIN: Primary | ICD-10-CM

## 2021-04-23 PROCEDURE — 3700000001 HC ADD 15 MINUTES (ANESTHESIA): Performed by: PODIATRIST

## 2021-04-23 PROCEDURE — 7100000000 HC PACU RECOVERY - FIRST 15 MIN: Performed by: PODIATRIST

## 2021-04-23 PROCEDURE — 2500000003 HC RX 250 WO HCPCS: Performed by: NURSE ANESTHETIST, CERTIFIED REGISTERED

## 2021-04-23 PROCEDURE — 2580000003 HC RX 258: Performed by: PODIATRIST

## 2021-04-23 PROCEDURE — 2500000003 HC RX 250 WO HCPCS: Performed by: PODIATRIST

## 2021-04-23 PROCEDURE — 3600000003 HC SURGERY LEVEL 3 BASE: Performed by: PODIATRIST

## 2021-04-23 PROCEDURE — 2580000003 HC RX 258: Performed by: NURSE ANESTHETIST, CERTIFIED REGISTERED

## 2021-04-23 PROCEDURE — 3700000000 HC ANESTHESIA ATTENDED CARE: Performed by: PODIATRIST

## 2021-04-23 PROCEDURE — 6360000002 HC RX W HCPCS: Performed by: NURSE ANESTHETIST, CERTIFIED REGISTERED

## 2021-04-23 PROCEDURE — 2580000003 HC RX 258: Performed by: ANESTHESIOLOGY

## 2021-04-23 PROCEDURE — 7100000011 HC PHASE II RECOVERY - ADDTL 15 MIN: Performed by: PODIATRIST

## 2021-04-23 PROCEDURE — 7100000010 HC PHASE II RECOVERY - FIRST 15 MIN: Performed by: PODIATRIST

## 2021-04-23 PROCEDURE — 2709999900 HC NON-CHARGEABLE SUPPLY: Performed by: PODIATRIST

## 2021-04-23 PROCEDURE — 6360000002 HC RX W HCPCS: Performed by: PODIATRIST

## 2021-04-23 PROCEDURE — 3600000013 HC SURGERY LEVEL 3 ADDTL 15MIN: Performed by: PODIATRIST

## 2021-04-23 RX ORDER — FENTANYL CITRATE 50 UG/ML
25 INJECTION, SOLUTION INTRAMUSCULAR; INTRAVENOUS EVERY 5 MIN PRN
Status: DISCONTINUED | OUTPATIENT
Start: 2021-04-23 | End: 2021-04-23 | Stop reason: HOSPADM

## 2021-04-23 RX ORDER — PROPOFOL 10 MG/ML
INJECTION, EMULSION INTRAVENOUS PRN
Status: DISCONTINUED | OUTPATIENT
Start: 2021-04-23 | End: 2021-04-23 | Stop reason: SDUPTHER

## 2021-04-23 RX ORDER — BUPIVACAINE HYDROCHLORIDE 5 MG/ML
INJECTION, SOLUTION EPIDURAL; INTRACAUDAL
Status: COMPLETED | OUTPATIENT
Start: 2021-04-23 | End: 2021-04-23

## 2021-04-23 RX ORDER — LIDOCAINE HYDROCHLORIDE 20 MG/ML
INJECTION, SOLUTION EPIDURAL; INFILTRATION; INTRACAUDAL; PERINEURAL PRN
Status: DISCONTINUED | OUTPATIENT
Start: 2021-04-23 | End: 2021-04-23 | Stop reason: SDUPTHER

## 2021-04-23 RX ORDER — SODIUM CHLORIDE 0.9 % (FLUSH) 0.9 %
5-40 SYRINGE (ML) INJECTION PRN
Status: DISCONTINUED | OUTPATIENT
Start: 2021-04-23 | End: 2021-04-23 | Stop reason: HOSPADM

## 2021-04-23 RX ORDER — PROMETHAZINE HYDROCHLORIDE 25 MG/ML
6.25 INJECTION, SOLUTION INTRAMUSCULAR; INTRAVENOUS
Status: DISCONTINUED | OUTPATIENT
Start: 2021-04-23 | End: 2021-04-23 | Stop reason: HOSPADM

## 2021-04-23 RX ORDER — PROPOFOL 10 MG/ML
INJECTION, EMULSION INTRAVENOUS CONTINUOUS PRN
Status: DISCONTINUED | OUTPATIENT
Start: 2021-04-23 | End: 2021-04-23 | Stop reason: SDUPTHER

## 2021-04-23 RX ORDER — FENTANYL CITRATE 50 UG/ML
INJECTION, SOLUTION INTRAMUSCULAR; INTRAVENOUS PRN
Status: DISCONTINUED | OUTPATIENT
Start: 2021-04-23 | End: 2021-04-23 | Stop reason: SDUPTHER

## 2021-04-23 RX ORDER — SODIUM CHLORIDE 9 MG/ML
INJECTION, SOLUTION INTRAVENOUS CONTINUOUS
Status: DISCONTINUED | OUTPATIENT
Start: 2021-04-23 | End: 2021-04-23 | Stop reason: HOSPADM

## 2021-04-23 RX ORDER — SODIUM CHLORIDE 0.9 % (FLUSH) 0.9 %
5-40 SYRINGE (ML) INJECTION EVERY 12 HOURS SCHEDULED
Status: DISCONTINUED | OUTPATIENT
Start: 2021-04-23 | End: 2021-04-23 | Stop reason: HOSPADM

## 2021-04-23 RX ORDER — SODIUM CHLORIDE 9 MG/ML
25 INJECTION, SOLUTION INTRAVENOUS PRN
Status: DISCONTINUED | OUTPATIENT
Start: 2021-04-23 | End: 2021-04-23 | Stop reason: HOSPADM

## 2021-04-23 RX ORDER — MIDAZOLAM HYDROCHLORIDE 1 MG/ML
INJECTION INTRAMUSCULAR; INTRAVENOUS PRN
Status: DISCONTINUED | OUTPATIENT
Start: 2021-04-23 | End: 2021-04-23 | Stop reason: SDUPTHER

## 2021-04-23 RX ORDER — HYDROCODONE BITARTRATE AND ACETAMINOPHEN 7.5; 325 MG/1; MG/1
1 TABLET ORAL EVERY 6 HOURS PRN
Qty: 28 TABLET | Refills: 0 | Status: SHIPPED | OUTPATIENT
Start: 2021-04-23 | End: 2021-04-30

## 2021-04-23 RX ORDER — LABETALOL HYDROCHLORIDE 5 MG/ML
5 INJECTION, SOLUTION INTRAVENOUS EVERY 10 MIN PRN
Status: DISCONTINUED | OUTPATIENT
Start: 2021-04-23 | End: 2021-04-23 | Stop reason: HOSPADM

## 2021-04-23 RX ORDER — DEXAMETHASONE SODIUM PHOSPHATE 4 MG/ML
INJECTION, SOLUTION INTRA-ARTICULAR; INTRALESIONAL; INTRAMUSCULAR; INTRAVENOUS; SOFT TISSUE
Status: COMPLETED | OUTPATIENT
Start: 2021-04-23 | End: 2021-04-23

## 2021-04-23 RX ADMIN — PHENYLEPHRINE HYDROCHLORIDE 100 MCG: 10 INJECTION INTRAVENOUS at 15:43

## 2021-04-23 RX ADMIN — PROPOFOL 100 MCG/KG/MIN: 10 INJECTION, EMULSION INTRAVENOUS at 15:22

## 2021-04-23 RX ADMIN — PROPOFOL 50 MG: 10 INJECTION, EMULSION INTRAVENOUS at 15:24

## 2021-04-23 RX ADMIN — LIDOCAINE HYDROCHLORIDE 60 MG: 20 INJECTION, SOLUTION EPIDURAL; INFILTRATION; INTRACAUDAL; PERINEURAL at 15:22

## 2021-04-23 RX ADMIN — SODIUM CHLORIDE: 9 INJECTION, SOLUTION INTRAVENOUS at 13:37

## 2021-04-23 RX ADMIN — FENTANYL CITRATE 25 MCG: 50 INJECTION INTRAMUSCULAR; INTRAVENOUS at 15:56

## 2021-04-23 RX ADMIN — FENTANYL CITRATE 50 MCG: 50 INJECTION INTRAMUSCULAR; INTRAVENOUS at 15:22

## 2021-04-23 RX ADMIN — Medication 1500 MG: at 13:38

## 2021-04-23 RX ADMIN — PHENYLEPHRINE HYDROCHLORIDE 100 MCG: 10 INJECTION INTRAVENOUS at 15:36

## 2021-04-23 RX ADMIN — PROPOFOL 50 MG: 10 INJECTION, EMULSION INTRAVENOUS at 15:22

## 2021-04-23 RX ADMIN — FENTANYL CITRATE 25 MCG: 50 INJECTION INTRAMUSCULAR; INTRAVENOUS at 15:58

## 2021-04-23 RX ADMIN — PHENYLEPHRINE HYDROCHLORIDE 100 MCG: 10 INJECTION INTRAVENOUS at 15:52

## 2021-04-23 RX ADMIN — MIDAZOLAM 2 MG: 1 INJECTION INTRAMUSCULAR; INTRAVENOUS at 15:13

## 2021-04-23 RX ADMIN — PHENYLEPHRINE HYDROCHLORIDE 100 MCG: 10 INJECTION INTRAVENOUS at 16:02

## 2021-04-23 RX ADMIN — PHENYLEPHRINE HYDROCHLORIDE 100 MCG: 10 INJECTION INTRAVENOUS at 15:59

## 2021-04-23 ASSESSMENT — PAIN SCALES - GENERAL
PAINLEVEL_OUTOF10: 0

## 2021-04-23 ASSESSMENT — PULMONARY FUNCTION TESTS
PIF_VALUE: 1
PIF_VALUE: 0
PIF_VALUE: 1

## 2021-04-23 NOTE — PROGRESS NOTES
Patient received from PACU awake, alert, VSS. Rt. Foot dressing dry and intact to site, good cap refill and positive pulse. Patient denies pain. Call light within reach, snack provided.

## 2021-04-23 NOTE — ANESTHESIA PRE PROCEDURE
syndrome)     SVT (supraventricular tachycardia) (HCC)     Stewart-Parkinson-White syndrome      Past Surgical History:   Procedure Laterality Date    APPENDECTOMY  1999    CATARACT REMOVAL WITH IMPLANT Left     CHOLECYSTECTOMY      COLONOSCOPY  2020    Dr. Jame Johnson    COLONOSCOPY N/A 3/5/2020    COLORECTAL CANCER SCREENING, NOT HIGH RISK performed by Dee Westbrook MD at [place] [address]  6-8-16    with mesh    OTHER SURGICAL HISTORY      bone spur removed from front of skull    PARTIAL HYSTERECTOMY  1999    TONSILLECTOMY       Social History     Tobacco Use    Smoking status: Former Smoker     Quit date: 2008     Years since quittin.8    Smokeless tobacco: Never Used   Substance Use Topics    Alcohol use: Yes     Comment: social use    Drug use: Never     Medications  No current facility-administered medications on file prior to encounter. Current Outpatient Medications on File Prior to Encounter   Medication Sig Dispense Refill    gabapentin (NEURONTIN) 300 MG capsule TAKE ONE CAPSULE BY MOUTH THREE TIMES A DAY 90 capsule 0    melatonin 3 MG TABS tablet Take 3 mg by mouth nightly as needed      cetirizine (ZYRTEC) 10 MG tablet Take 10 mg by mouth as needed       naproxen (NAPROSYN) 500 MG tablet TAKE ONE TABLET BY MOUTH TWICE A DAY WITH MEALS (Patient taking differently: Take 500 mg by mouth as needed TAKE ONE TABLET BY MOUTH TWICE A DAY WITH MEALS as needed) 60 tablet 2    Calcium Carbonate-Vitamin D (CALTRATE 600+D PO) Take 1 tablet by mouth daily       Multiple Vitamins-Minerals (THERAPEUTIC MULTIVITAMIN-MINERALS) tablet Take 1 tablet by mouth daily      omeprazole (PRILOSEC) 20 MG capsule Take 20 mg by mouth daily as needed       diphenhydrAMINE (BENADRYL) 25 MG tablet Take 25 mg by mouth nightly as needed.       senna (SENOKOT) 8.6 MG tablet Take 1 tablet by mouth daily as needed        Current Facility-Administered Medications   Medication Dose Route Frequency Provider Last Rate Last Admin    0.9 % sodium chloride infusion   Intravenous Continuous Kennedy Soria MD        sodium chloride flush 0.9 % injection 5-40 mL  5-40 mL Intravenous 2 times per day Kennedy Soria MD        sodium chloride flush 0.9 % injection 5-40 mL  5-40 mL Intravenous PRN Kennedy Soria MD        0.9 % sodium chloride infusion  25 mL Intravenous PRN Kennedy Soria MD        vancomycin (VANCOCIN) 1500 mg in dextrose 5 % 250 mL IVPB  1,500 mg Intravenous Once [facility]         Vital Signs (Current) There were no vitals filed for this visit. Vital Signs Statistics (for past 48 hrs)     No data recorded    BP Readings from Last 3 Encounters:   04/20/21 122/82   02/24/21 126/76   11/30/20 122/78     BMI  Body mass index is 29.05 kg/m². Estimated body mass index is 29.05 kg/m² as calculated from the following:    Height as of this encounter: 5' 6\" (1.676 m). Weight as of this encounter: 180 lb (81.6 kg).     CBC   Lab Results   Component Value Date    WBC 6.2 07/10/2020    RBC 4.52 07/10/2020    HGB 13.4 07/10/2020    HCT 40.8 07/10/2020    MCV 90.2 07/10/2020    RDW 13.6 07/10/2020     07/10/2020     CMP    Lab Results   Component Value Date     07/10/2020    K 3.9 07/10/2020     07/10/2020    CO2 23 07/10/2020    BUN 14 07/10/2020    CREATININE 0.6 07/10/2020    GFRAA >60 07/10/2020    GFRAA >60 03/25/2011    AGRATIO 1.5 03/25/2011    LABGLOM >60 07/10/2020    GLUCOSE 99 07/10/2020    PROT 6.9 04/01/2014    PROT 7.6 03/25/2011    CALCIUM 9.0 07/10/2020    BILITOT 0.2 04/01/2014    ALKPHOS 58 04/01/2014    AST 15 04/01/2014    ALT 11 04/01/2014     BMP    Lab Results   Component Value Date     07/10/2020    K 3.9 07/10/2020     07/10/2020    CO2 23 07/10/2020    BUN 14 07/10/2020    CREATININE 0.6 07/10/2020    CALCIUM 9.0 07/10/2020    GFRAA >60 07/10/2020    GFRAA >60 03/25/2011    LABGLOM >60 07/10/2020    GLUCOSE 99 07/10/2020     POCGlucose  No results for input(s): GLUCOSE in the last 72 hours. Coags  No results found for: PROTIME, INR, APTT  HCG (If Applicable) No results found for: PREGTESTUR, PREGSERUM, HCG, HCGQUANT   ABGs No results found for: PHART, PO2ART, ELW3JND, CII8EGE, BEART, A4AUOFDH   Type & Screen (If Applicable)  Lab Results   Component Value Date    LABABO A 04/06/2011    [facility] MAYKEL Positive 04/06/2011                            BMI: Wt Readings from Last 3 Encounters:       NPO Status: 8 hours                          Anesthesia Evaluation  Patient summary reviewed no history of anesthetic complications:   Airway: Mallampati: III  TM distance: >3 FB   Neck ROM: full   Dental: normal exam         Pulmonary:Negative Pulmonary ROS and normal exam                               Cardiovascular:  Exercise tolerance: good (>4 METS),   (+) dysrhythmias (WPW, ef 55): SVT,       ECG reviewed  Rhythm: regular  Rate: normal  Echocardiogram reviewed         Beta Blocker:  Not on Beta Blocker         Neuro/Psych:   (+) neuromuscular disease:, psychiatric history:depression/anxiety             GI/Hepatic/Renal:   (+) GERD:,           Endo/Other: Negative Endo/Other ROS                    Abdominal:           Vascular: negative vascular ROS. Anesthesia Plan      MAC     ASA 3       Induction: intravenous. MIPS: Postoperative opioids intended and Prophylactic antiemetics administered. Anesthetic plan and risks discussed with patient and spouse. Plan discussed with CRNA. This pre-anesthesia assessment may be used as a history and physical.    DOS STAFF ADDENDUM:    Pt seen and examined, chart reviewed (including anesthesia, drug and allergy history). No interval changes to history and physical examination. Anesthetic plan, risks, benefits, alternatives, and personnel involved discussed with patient.  Questions and concerns addressed. Patient(family) verbalized an understanding and agrees to proceed.       Rosy Brown MD  April 23, 2021  1:06 PM

## 2021-04-23 NOTE — BRIEF OP NOTE
Brief Postoperative Note      Patient: Efrain Clarke  YOB: 1960  MRN: [id number]    Date of Procedure: 4/23/2021    Pre-Op Diagnosis: GANGLION RIGHT ANKLE AND FOOT, PAIN IN RIGHT FOOT/ ANKLE    Post-Op Diagnosis: Same       Procedure(s):  EXCISION OF LARGE GANGLION CYST RIGHT FOOT AND ANKLE    Surgeon(s):  Daija Jones DPM    Assistant:  Surgical Assistant: Bailey Chambers    Anesthesia: Monitor Anesthesia Care    Estimated Blood Loss (mL): less than 50     Complications: None    Specimens:   * No specimens in log *    Implants:  * No implants in log *      Drains: * No LDAs found *    Findings: ganglion/sack    Electronically signed by Daija Jones DPM on 4/23/2021 at 4:13 PM

## 2021-04-24 NOTE — OP NOTE
[address] [place] [address] Ivy Stevens                                 OPERATIVE REPORT    PATIENT NAME: Aj Valdez                    :        1960  MED REC NO:   [id number]                          ROOM:  ACCOUNT NO:   [de-identified]                           ADMIT DATE: 2021  PROVIDER:     Willem Call DPM      DATE OF PROCEDURE:  2021    PREOPERATIVE DIAGNOSIS:  Ganglion cyst, right foot. POSTOPERATIVE DIAGNOSIS:  Ganglion cyst, right foot. OPERATION PERFORMED:  Excision of ganglion cyst, right foot. SURGEON:  Willem Call DPM    ANESTHESIA:  MAC. HEMOSTASIS:  Calf tourniquet at 250 mmHg. ESTIMATED BLOOD LOSS:  Less than 20 mL. MATERIALS:  3-0 Vicryl, 4-0 Vicryl, 4-0 Monocryl. INJECTABLES:  Carbocaine 2%, 0.5% plain Marcaine and Decadron. COMPLICATIONS:  None. INDICATIONS:  The patient presents to [facility] for surgical correction  of ganglion cyst, dorsal lateral aspect of right rearfoot. The patient  has had extensive conservative treatment without overall improvement in  symptoms and requests surgical intervention at this time. She was  advised of alternative treatment options, also advised of possible risks  and complications associated with surgery including infection, delayed  healing, swelling, pain, numbness, recurrence of deformity, need for  further surgical intervention, recurrence of cyst or loss of digit or  leg due to vascular or infectious process. The patient understands  these possibilities and requests that surgery be performed as planned. She was given the opportunity to ask any questions and all questions  were answered in a satisfactory manner. OPERATIVE PROCEDURE:  The patient was taken to the operating room via  cart and placed on the table in supine position. She received IV  sedation by the anesthesia staff.   She was also given local anesthetic  block of the right foot consisting of 2% Carbocaine. A well-padded  tourniquet was placed at the level of the right ankle. The patient's  foot was prepped and draped in the usual aseptic technique. An Esmarch  bandage was used to exsanguinate the foot, and the tourniquet was  inflated to 250 mmHg, which provided adequate hemostasis throughout the  entire procedure. Attention was focused to the dorsal lateral aspect of the rearfoot area,  where a ganglion cyst was previously identified. No fluid was present  on palpation at this time. Linear incision measuring approximately 6 cm in length was made. Incision was deepened and underscored taking care to retract all  neurovascular structures and electrocoagulating any bleeders as  necessary. Deep dissection was continued to the level of the extensor  digitorum brevis muscle. Muscle was carefully dissected and retracted  and deep dissection continued. At this time, the area of ganglion cyst  was identified, however, no ganglion fluid was present at this time. There was an area of tunneling under the extensor digitorum brevis  muscle toward the dorsal aspect of the foot with obvious cyst sac  present. This abnormal tissue was excised in toto. Thorough  examination and debridement was performed taking care to disrupt and  remove any remaining ganglion cyst sac remnants. Surgical site was  copiously irrigated with antibiotic solution. The deep tissue and  muscle was re-approximated using 4-0 Vicryl. Subcutaneous tissues were  reapproximated using 4-0 Vicryl. Skin was re-approximated using a  subcuticular suture of 4-0 Monocryl. Surgical site was injected with Decadron. The patient was also given a  postoperative local anesthetic block consisting of 0.5% plain Marcaine  for prolonged anesthesia. Sterile dressing consisting of Steri-Strips,  Xeroform gauze, 4x4s, Xavier, Kerlix, and Coban was applied to the right  foot. Tourniquet was released.   Immediate capillary refill was noticed  to all digits. The patient tolerated the procedure and anesthesia well, was sent to the  recovery room in good condition with vital signs stable and capillary  refill to all digits of right foot less than three seconds. She was  given written and verbal instructions, which were reinforced by the  nursing staff. She was advised to call if any problems or complications  arise or seek attention in the emergency room if unable to contact  myself. She was given a prescription for Norco 7.5/325, 28 tablets, one  tablet p.o. q.6 hours p.r.n. for pain. Pneumatic boot was dispensed  with instruction for the patient to remain minimal weightbearing, right  foot. The patient will return to the office in one week postop for  followup care.         Elva Daniel DPM    D: 04/23/2021 17:00:53       T: 04/23/2021 21:09:10     [email or web address]  Job#: [id number]     Doc#: [id number]    CC:

## 2021-05-03 RX ORDER — GABAPENTIN 300 MG/1
CAPSULE ORAL
Qty: 90 CAPSULE | Refills: 0 | Status: SHIPPED | OUTPATIENT
Start: 2021-05-03 | End: 2021-06-01

## 2021-05-26 ENCOUNTER — TELEPHONE (OUTPATIENT)
Dept: FAMILY MEDICINE CLINIC | Age: 61
End: 2021-05-26

## 2021-05-26 RX ORDER — HYDROXYZINE HYDROCHLORIDE 25 MG/1
25 TABLET, FILM COATED ORAL 3 TIMES DAILY PRN
Qty: 90 TABLET | Refills: 2 | Status: SHIPPED | OUTPATIENT
Start: 2021-05-26 | End: 2022-07-08 | Stop reason: SDUPTHER

## 2021-05-27 ENCOUNTER — PATIENT MESSAGE (OUTPATIENT)
Dept: PSYCHIATRY | Age: 61
End: 2021-05-27

## 2021-06-01 RX ORDER — GABAPENTIN 300 MG/1
CAPSULE ORAL
Qty: 90 CAPSULE | Refills: 0 | Status: SHIPPED | OUTPATIENT
Start: 2021-07-01 | End: 2021-06-28

## 2021-06-28 RX ORDER — GABAPENTIN 300 MG/1
CAPSULE ORAL
Qty: 90 CAPSULE | Refills: 0 | Status: SHIPPED | OUTPATIENT
Start: 2021-06-28 | End: 2021-07-26

## 2021-07-26 RX ORDER — GABAPENTIN 300 MG/1
CAPSULE ORAL
Qty: 90 CAPSULE | Refills: 0 | Status: SHIPPED | OUTPATIENT
Start: 2021-07-26 | End: 2021-09-27

## 2021-08-03 ENCOUNTER — HOSPITAL ENCOUNTER (EMERGENCY)
Age: 61
Discharge: HOME | End: 2021-08-03
Attending: EMERGENCY MEDICINE
Payer: COMMERCIAL

## 2021-08-03 ENCOUNTER — APPOINTMENT (OUTPATIENT)
Dept: GENERAL RADIOLOGY | Age: 61
End: 2021-08-03
Payer: COMMERCIAL

## 2021-08-03 VITALS
SYSTOLIC BLOOD PRESSURE: 143 MMHG | DIASTOLIC BLOOD PRESSURE: 79 MMHG | HEART RATE: 78 BPM | TEMPERATURE: 98.2 F | OXYGEN SATURATION: 98 % | RESPIRATION RATE: 16 BRPM | WEIGHT: 180 LBS | BODY MASS INDEX: 29.05 KG/M2

## 2021-08-03 DIAGNOSIS — S62.617A CLOSED DISPLACED FRACTURE OF PROXIMAL PHALANX OF LEFT LITTLE FINGER, INITIAL ENCOUNTER: Primary | ICD-10-CM

## 2021-08-03 PROCEDURE — 99284 EMERGENCY DEPT VISIT MOD MDM: CPT

## 2021-08-03 PROCEDURE — 73562 X-RAY EXAM OF KNEE 3: CPT

## 2021-08-03 PROCEDURE — 71101 X-RAY EXAM UNILAT RIBS/CHEST: CPT

## 2021-08-03 PROCEDURE — 6370000000 HC RX 637 (ALT 250 FOR IP): Performed by: EMERGENCY MEDICINE

## 2021-08-03 PROCEDURE — 73130 X-RAY EXAM OF HAND: CPT

## 2021-08-03 RX ORDER — IBUPROFEN 600 MG/1
600 TABLET ORAL EVERY 8 HOURS PRN
Qty: 15 TABLET | Refills: 0 | Status: SHIPPED | OUTPATIENT
Start: 2021-08-03 | End: 2021-10-05

## 2021-08-03 RX ORDER — IBUPROFEN 600 MG/1
600 TABLET ORAL ONCE
Status: COMPLETED | OUTPATIENT
Start: 2021-08-03 | End: 2021-08-03

## 2021-08-03 RX ORDER — ACETAMINOPHEN 325 MG/1
650 TABLET ORAL ONCE
Status: COMPLETED | OUTPATIENT
Start: 2021-08-03 | End: 2021-08-03

## 2021-08-03 RX ADMIN — ACETAMINOPHEN 650 MG: 325 TABLET ORAL at 12:12

## 2021-08-03 RX ADMIN — IBUPROFEN 600 MG: 600 TABLET, FILM COATED ORAL at 12:12

## 2021-08-03 ASSESSMENT — PAIN DESCRIPTION - ORIENTATION: ORIENTATION: LEFT

## 2021-08-03 ASSESSMENT — ENCOUNTER SYMPTOMS
VOMITING: 0
DIARRHEA: 0
VOICE CHANGE: 0
NAUSEA: 0
SHORTNESS OF BREATH: 0
TROUBLE SWALLOWING: 0

## 2021-08-03 ASSESSMENT — PAIN SCALES - GENERAL
PAINLEVEL_OUTOF10: 6
PAINLEVEL_OUTOF10: 5
PAINLEVEL_OUTOF10: 5

## 2021-08-03 ASSESSMENT — PAIN DESCRIPTION - LOCATION: LOCATION: HAND

## 2021-08-03 ASSESSMENT — PAIN DESCRIPTION - PAIN TYPE: TYPE: ACUTE PAIN

## 2021-08-03 ASSESSMENT — PAIN - FUNCTIONAL ASSESSMENT: PAIN_FUNCTIONAL_ASSESSMENT: 0-10

## 2021-08-03 NOTE — ED NOTES
Finger splint in place  Finger with rapid cap  Strong radial pulse  Aware to follow up with ortho prior to going back to work  To ice and elevate  To return for worsenings     Nehal Zepeda RN  08/03/21 [id number]

## 2021-08-03 NOTE — ED TRIAGE NOTES
States she ripped over a cord at work and fell injuring left hand, right ankle, knee, and shoulder. Swelling noted left lateral hand. Ice applied to area.

## 2021-08-03 NOTE — ED PROVIDER NOTES
[address] [facility]  eMERGENCY dEPARTMENT eNCOUnter      Pt Name: Izzy Alcaraz  MRN: [id number]  [place] 1960  Date of evaluation: 8/3/2021  Provider: Keri Meyer MD    CHIEF COMPLAINT       Chief Complaint   Patient presents with   Mariah Baum     states she tripped over a cord at work and injured her left hand and wrist, right ankle, knee, and shoulder         HISTORY OF PRESENT ILLNESS   (Location/Symptom, Timing/Onset, Context/Setting, Quality, Duration, Modifying Factors, Severity)  Note limiting factors. Izzy Alcaraz is a 63 y.o. female who reports left hand pain, right knee pain, and right rib pain after a fall at work. The patient reports that she tripped over a cord at work and landed on her left hand and right knee. She also reports right rib pain. Reports pain is moderate constant worsening. With movement worsens the pain and nothing improves it. She denies taking any medicine for coming to the ER. HPI    Nursing Notes were reviewed. REVIEW OFSYSTEMS    (2-9 systems for level 4, 10 or more for level 5)     Review of Systems   Constitutional: Negative for appetite change and fever. HENT: Negative for trouble swallowing and voice change. Eyes: Negative for visual disturbance. Respiratory: Negative for shortness of breath. Cardiovascular: Negative for chest pain and palpitations. Gastrointestinal: Negative for diarrhea, nausea and vomiting. Genitourinary: Negative for dysuria. Musculoskeletal: Positive for arthralgias. Negative for gait problem. Neurological: Negative for seizures and syncope. Psychiatric/Behavioral: Negative for self-injury and suicidal ideas. Except as noted above the remainder of the review of systems was reviewed and negative.        PAST MEDICAL HISTORY     Past Medical History:   Diagnosis Date    Allergic rhinitis     Anxiety disorder     Depression     HSV (herpes simplex virus) infection     IBS (irritable bowel syndrome)     SVT (supraventricular tachycardia) (HCC)     Stewart-Parkinson-White syndrome          SURGICAL HISTORY       Past Surgical History:   Procedure Laterality Date    APPENDECTOMY  07/05/1999    CATARACT REMOVAL WITH IMPLANT Left     CHOLECYSTECTOMY      COLONOSCOPY  03/2020    Dr. Alfonso Araujo N/A 3/5/2020    COLORECTAL CANCER SCREENING, NOT HIGH RISK performed by Ellis Brown MD at [facility] 4/23/2021    EXCISION OF LARGE GANGLION CYST RIGHT FOOT AND ANKLE performed by Mary García DPM at [address] [facility]  6-8-16    with mesh    OTHER SURGICAL HISTORY      bone spur removed from front of skull    PARTIAL HYSTERECTOMY  07/16/1999    TONSILLECTOMY           CURRENT MEDICATIONS       Previous Medications    CALCIUM CARBONATE-VITAMIN D (CALTRATE 600+D PO)    Take 1 tablet by mouth daily     CETIRIZINE (ZYRTEC) 10 MG TABLET    Take 10 mg by mouth as needed     DIPHENHYDRAMINE (BENADRYL) 25 MG TABLET    Take 25 mg by mouth nightly as needed.     GABAPENTIN (NEURONTIN) 300 MG CAPSULE    TAKE ONE CAPSULE BY MOUTH THREE TIMES A DAY    HYDROXYZINE (ATARAX) 25 MG TABLET    Take 1 tablet by mouth 3 times daily as needed for Anxiety    MELATONIN 3 MG TABS TABLET    Take 3 mg by mouth nightly as needed    MULTIPLE VITAMINS-MINERALS (THERAPEUTIC MULTIVITAMIN-MINERALS) TABLET    Take 1 tablet by mouth daily    OMEPRAZOLE (PRILOSEC) 20 MG CAPSULE    Take 20 mg by mouth daily as needed     QUETIAPINE (SEROQUEL) 25 MG TABLET    Take 1-2 tablets nightly    SENNA (SENOKOT) 8.6 MG TABLET    Take 1 tablet by mouth daily as needed        ALLERGIES     Dye [iodides], Shellfish-derived products, Shrimp (diagnostic), Bactrim [sulfamethoxazole-trimethoprim], Paxil [paroxetine], Pcn [penicillins], Amoxicillin-pot clavulanate, Cephalexin, Clindamycin/lincomycin cross reactors, Erythromycin, Nortriptyline, Sertraline, Sulfa antibiotics, and Tetracyclines & related    FAMILY HISTORY       Family History   Problem Relation Age of Onset    Heart Disease Father     Diabetes Father     No Known Problems Mother     Kidney Disease Paternal Aunt           SOCIAL HISTORY       Social History     Socioeconomic History    Marital status:      Spouse name: None    Number of children: None    Years of education: None    Highest education level: None   Occupational History    None   Tobacco Use    Smoking status: Former Smoker     Quit date: 2008     Years since quittin.1    Smokeless tobacco: Never Used   Vaping Use    Vaping Use: Never used   Substance and Sexual Activity    Alcohol use: Yes     Comment: social use    Drug use: Never    Sexual activity: None   Other Topics Concern    None   Social History Narrative    None     Social Determinants of Health     Financial Resource Strain: Low Risk     Difficulty of Paying Living Expenses: Not hard at all   Food Insecurity: No Food Insecurity    Worried About Running Out of Food in the Last Year: Never true    [place] of Food in the Last Year: Never true   Transportation Needs: No Transportation Needs    Lack of Transportation (Medical): No    Lack of Transportation (Non-Medical):  No   Physical Activity:     Days of Exercise per Week:     Minutes of Exercise per Session:    Stress:     Feeling of Stress :    Social Connections:     Frequency of Communication with Friends and Family:     Frequency of Social Gatherings with Friends and Family:     Attends Congregational Services:     Active Member of Clubs or Organizations:     Attends Club or Organization Meetings:     Marital Status:    Intimate Partner Violence:     Fear of Current or Ex-Partner:     Emotionally Abused:     Physically Abused:     Sexually Abused:          PHYSICAL EXAM    (up to 7 for level 4, 8 or more for level 5)     ED Triage Vitals [21 1130]   BP Temp Temp Source Pulse Resp SpO2 Height Weight   117/83 98.2 °F (36.8 °C) Oral 87 16 99 % -- 180 lb (81.6 kg)       Physical Exam  Constitutional:       General: She is not in acute distress. Appearance: She is well-developed. HENT:      Head: Normocephalic and atraumatic. Comments: No raccoon sign, fam sign, or depressible skull fractures. No tenderness to the head or neck. No signs of head or neck trauma. Eyes:      Conjunctiva/sclera: Conjunctivae normal.   Neck:      Vascular: No JVD. Cardiovascular:      Rate and Rhythm: Normal rate. Pulses: Normal pulses. Comments: 2+ radial and ulnar pulses to left upper extremity. Normal cap refill to fingers of left hand. Pulmonary:      Effort: Pulmonary effort is normal. No respiratory distress. Comments: Mild tenderness to anterior fourth and fifth ribs of the right chest.  Normal breath sounds bilaterally. Chest:      Chest wall: Tenderness present. Abdominal:      Tenderness: There is no abdominal tenderness. There is no rebound. Musculoskeletal:         General: Tenderness present. No swelling or deformity. Cervical back: Normal range of motion and neck supple. No rigidity or tenderness. Comments: Tenderness to diffuse left hand most prominently in left proximal pinky. No wrist tenderness. Mild right knee tenderness. Neurological:      General: No focal deficit present. Mental Status: She is alert and oriented to person, place, and time. Comments: Normal speech and normal gait on own power. No focal neurologic deficits. DIAGNOSTIC RESULTS         RADIOLOGY:     Interpretation per the Radiologist below, if available at the time of this note:    XR KNEE RIGHT (3 VIEWS)   Final Result   No acute abnormality         XR RIBS RIGHT INCLUDE CHEST (MIN 3 VIEWS)   Final Result   No acute cardiopulmonary disease. No acute acute osseous abnormality of the right ribs.          XR HAND LEFT (MIN 3 VIEWS)   Final Result   5th proximal phalanx fracture               ED BEDSIDE ULTRASOUND:   Performed by ED Physician - none    LABS:  Labs Reviewed - No data to display    All otherlabs were within normal range or not returned as of this dictation. EMERGENCY DEPARTMENT COURSE and DIFFERENTIAL DIAGNOSIS/MDM:   Vitals:    Vitals:    08/03/21 1130 08/03/21 1301   BP: 117/83 (!) 143/79   Pulse: 87 78   Resp: 16 16   Temp: 98.2 °F (36.8 °C)    TempSrc: Oral    SpO2: 99% 98%   Weight: 180 lb (81.6 kg)          MDM  Plain films show a mildly impacted fracture of the fifth proximal phalanx. The injury is closed and the patient is neurovascular intact. And plan films are unremarkable. Patient is placed in a splint at full extension and a work note is written. We have discussed pain management the patient prefers to take high-dose ibuprofen not take narcotics which I feel is reasonable. She is referred to her primary care doctor as well as orthopedic clinic and standard ER return precautions given for any new or worsening symptoms. The patient expresses understanding and agreement with this plan. I estimate there is low risk for COMPARTMENT SYNDROME, DEEP VENOUS THROMBOSIS, SEPTIC ARTHRITIS, TENDON OR NEUROVASCULAR INJURY, thus I consider the discharge disposition reasonable. The patient and I have discussed the diagnosis and risks, and we agree with discharging home to follow-up with their primary doctor or the referral orthopedist. We also discussed returning to the Emergency Department immediately if new or worsening symptoms occur. We have discussed the symptoms which are most concerning (e.g., changing or worsening pain, numbness, weakness) that necessitate immediate return           Procedures    FINAL IMPRESSION      1.  Closed displaced fracture of proximal phalanx of left little finger, initial encounter          DISPOSITION/PLAN   DISPOSITION Decision To Discharge 08/03/2021 01:34:03 PM        (Please note that portions of this note were completed with a voice recognition program.  Efforts were made to edit the dictations but occasionally words aremis-transcribed. )    Lisandra Jimenez MD (electronically signed)  Attending Emergency Physician           Lisandra Jimenez MD  08/03/21 [id number]

## 2021-08-05 ENCOUNTER — OFFICE VISIT (OUTPATIENT)
Dept: ORTHOPEDIC SURGERY | Age: 61
End: 2021-08-05
Payer: COMMERCIAL

## 2021-08-05 ENCOUNTER — TELEPHONE (OUTPATIENT)
Dept: ORTHOPEDIC SURGERY | Age: 61
End: 2021-08-05

## 2021-08-05 VITALS — WEIGHT: 180 LBS | BODY MASS INDEX: 28.93 KG/M2 | HEIGHT: 66 IN | RESPIRATION RATE: 16 BRPM

## 2021-08-05 DIAGNOSIS — S62.617A CLOSED DISPLACED FRACTURE OF PROXIMAL PHALANX OF LEFT LITTLE FINGER, INITIAL ENCOUNTER: Primary | ICD-10-CM

## 2021-08-05 PROCEDURE — 99204 OFFICE O/P NEW MOD 45 MIN: CPT | Performed by: PHYSICIAN ASSISTANT

## 2021-08-05 PROCEDURE — 26720 TREAT FINGER FRACTURE EACH: CPT | Performed by: PHYSICIAN ASSISTANT

## 2021-08-05 NOTE — TELEPHONE ENCOUNTER
Same day appointment add on. I was given okay to schedule IW yesterday 8/4/21 from [facility].   Reach IW this am to schedule

## 2021-08-05 NOTE — PROGRESS NOTES
Ms. Annemarie Mcneill is a 63 y.o. left handed woman  who is seen today in Hand Surgical Consultation at the request of Yamilet Fuentes DO. She is seen today regarding an injury occurring on August 2nd, 2021. She reports injuring her left Small Finger, having tripped and fallen at work. At the time of injury, there was clear dislocation or malposition of the finger. She was seen for Emergency evaluation elsewhere, radiographs were obtained & she has been immobilized. By report, there  was not an associated skin injury. She reports moderate pain located in the Dorsal aspect of the Small Finger, no tenderness of the wrist or elbow. She notes today, no neurologic symptoms in the Whole Hand. Symptoms show no change over time. I have today reviewed with Annemarie Mcneill the clinically relevant, past medical history, medications, allergies,  family history, social history, and Review Of Systems & I have documented any details relevant to today's presenting complaints in my history above. Ms. Rayne Magallon's self-reported past medical history, medications, allergies,  family history, social history, and Review Of Systems have been scanned into the chart under the \"Media\" tab. Physical Exam:  Ms. Rayne Magallon's most recent vitals:       She is well nourished, oriented to person, place & time. She demonstrates appropriate mood and affect as well as normal gait and station. Skin: Normal in appearance, Normal Color and Free of Lesions Bilaterally   Digital range of motion is stiff from immobilization on the Left, normal on the Right  Wrist range of motion is Full and equal bilaterally bilaterally  Sensation is subjectively normal in the Whole Hand, other digits all show normal sensation bilaterally  Vascular examination reveals normal, good capillary refill and good color bilaterally. There is mild acute ecchymosis. Swelling is mild in the Small Finger, centered about the Proximal Phalanx.   No other digit shows significant swelling bilaterally  There is no evidence of gross joint instability bilaterally. Muscular strength is clinically appropriate bilaterally. Maximal pain is elicited with palpation of the Proximal Phalanx of the Small Finger. The base of the hand & wrist are not tender to palpation. There is a 0 degree angular deformity and no clinical evidence of  mal-rotation of the injured digit. Radiographic Evaluation:  Radiographs, taken From a Hospital location outside of my practice were Personally Reviewed & Interpreted by myself today (3 views of the left hand). They demonstrate evidence of an acute fracture of the Base of the Proximal Phalanx of the Small Finger with mild comminution, 10 degrees of angular deformity and no  mal-rotation. The fracture does not involve the joint surface. There is not evidence of other injury or bony fracture. Impression:  Ms. Evonne Albarran has sustained recent mildly impacted Small Finger Proximal Phalanx Base fracture(s) and presents requesting further treatment. Plan:  I have discussed with Ms. Evonne Albarran the various treatment options for treatment of her left Small Finger Proximal Phalanx fracture. We discussed the options of Conservative management of the fracture (accepting its current position and the functional consequences thereof), attempted closed reduction & cast immobilization (and the limitations which go along with cast immobilization), and Surgical Management in the form of Closed Reduction & Percutaneous Pinning  and/or Open Reduction & Internal Fixation of the fracture. She has elected to proceed conservatively, voicing an understanding of the other options available to her. I have explained the complications, limitations, expectations, alternatives, & risks of her chosen treatment.   We discussed the possibility of residual symptoms as may be related to conservative treatment of fractures including the possibilities of: mal-union, non-union, delayed union, persistent deformity, persistent pain, limitation of motion, future arthritic symptoms & the possible need for further treatment. She understood our discussion and was comfortable with her decision; she was provided with appropriate expectations. She is today fitted with a carefully applied, well padded & appropriately molded short arm fiberglass cast incorporating the Ring Finger and Small Finger in an intrinsic safe position with the ring finger and small finger christian taped together. She was given a Patient Instruction Sheet related to cast care. I have asked her to schedule a follow-up appointment for 4 weeks from now at which time we will obtain repeat radiographs of the Small Finger  out of splint/cast.    She is specifically instructed to contact the office between now & her scheduled appointment if she has concerns related to the cast or the underlying injury. She is welcome to call for an appointment sooner if she has any additional concerns or questions. I have also discussed with Ms. Ling Rae  the other treatment options available to her  for this condition. We have today selected to proceed with conservative management. She and I have agreed that if our current course of conservative treatment does not prove to be effective over the short term future, that she will schedule a follow-up appointment to discuss and select an alternate course of therapy including possibly injection or surgical treatment. Ms. Ling Rae has been given a full verbal list of instructions and precautions related to her present condition. I have asked her to followup with me in the office at the prescribed time. She is also specifically requested to call or return to the office sooner if her symptoms change or worsen prior to the next scheduled appointment.

## 2021-08-05 NOTE — PROGRESS NOTES
Patricia Atkins PA-C. ordered a ulnar gutter  to be applied to [employer] right upper Hand . I applied a ulnar gutter cast to [employer] right upper Hand. I applied 2 rolls of under padding in a 50/50 fashion. The Daria Escort requested purple color fiberglass. I rolled 2 rolls of fiberglass in a 50/50 fashion. Her right upper Hand is in a neutral degree position Marco Antonio Roque PA-C . Moris Magallon's nail beds are pink in color, the extremity is warm to the touch. Capillary refill is less than 2 seconds. Daria Escort instructed on proper care of cast.  Do not get wet, keep all items out of cast.  If cast is painful please make appointment to get checked. Patient also briefed on circulation compromise. If digits are cold, blue, and tingling patient must must seek care. If after hours patient is to go to Emergency Room. During office hours patient must come in to office.    short arm fiberglass cast incorporating the Ring Finger and Small Finger in an intrinsic safe postion

## 2021-08-05 NOTE — LETTER
[facility]  [place] [address] [address] [place] [address] [address] [address]  Phone: [phone]  Fax: [phone]    Lexi Pathak        August 5, 2021     Patient: William Sprague   YOB: 1960   Date of Visit: 8/5/2021       To Whom It May Concern: It is my medical opinion that Judith Score may return to light duty immediately with the following restrictions: no work involving left hand for 4 weeks. If you have any questions or concerns, please don't hesitate to call.     Sincerely,        Elvis Jade PA-C

## 2021-08-20 ENCOUNTER — TELEPHONE (OUTPATIENT)
Dept: ORTHOPEDIC SURGERY | Age: 61
End: 2021-08-20

## 2021-08-20 NOTE — TELEPHONE ENCOUNTER
Received a fax from [facility] requesting a [employer] 14 be completed from 8/5/2021 office visit.

## 2021-08-23 ENCOUNTER — HOSPITAL ENCOUNTER (OUTPATIENT)
Age: 61
Discharge: HOME | End: 2021-08-23
Payer: COMMERCIAL

## 2021-08-23 ENCOUNTER — TELEPHONE (OUTPATIENT)
Dept: FAMILY MEDICINE CLINIC | Age: 61
End: 2021-08-23

## 2021-08-23 ENCOUNTER — HOSPITAL ENCOUNTER (OUTPATIENT)
Dept: GENERAL RADIOLOGY | Age: 61
Discharge: HOME | End: 2021-08-23
Payer: COMMERCIAL

## 2021-08-23 DIAGNOSIS — Z92.89 HISTORY OF POSITIVE PPD: Primary | ICD-10-CM

## 2021-08-23 DIAGNOSIS — Z92.89 HISTORY OF POSITIVE PPD: ICD-10-CM

## 2021-08-23 PROCEDURE — 71046 X-RAY EXAM CHEST 2 VIEWS: CPT

## 2021-08-23 NOTE — TELEPHONE ENCOUNTER
----- Message from Abdelrahman Martins sent at 8/23/2021  9:37 AM EDT -----  Subject: Referral Request    QUESTIONS   Reason for referral request? PT needs a chest X Ray for TB testing   Has the physician seen you for this condition before? Yes  Select a date? 2001-08-01  Select the Provider the patient wants to be referred to, if known (PCP or   Specialist)? Ashli Carvalho   Preferred Specialist (if applicable)? Do you already have an appointment scheduled? No  Additional Information for Provider?   ---------------------------------------------------------------------------  --------------  CALL BACK INFO  What is the best way for the office to contact you? OK to leave message on   voicemail, OK to respond with electronic message via [employer] portal (only   for patients who have registered [employer] account)  Preferred Call Back Phone Number?  [phone]

## 2021-09-03 ENCOUNTER — OFFICE VISIT (OUTPATIENT)
Dept: ORTHOPEDIC SURGERY | Age: 61
End: 2021-09-03

## 2021-09-03 VITALS — WEIGHT: 180 LBS | BODY MASS INDEX: 28.93 KG/M2 | HEIGHT: 66 IN | RESPIRATION RATE: 16 BRPM

## 2021-09-03 DIAGNOSIS — S62.617A CLOSED DISPLACED FRACTURE OF PROXIMAL PHALANX OF LEFT LITTLE FINGER, INITIAL ENCOUNTER: Primary | ICD-10-CM

## 2021-09-03 PROCEDURE — 99024 POSTOP FOLLOW-UP VISIT: CPT | Performed by: ORTHOPAEDIC SURGERY

## 2021-09-03 NOTE — Clinical Note
Dear  Marilee Bernabe, DO,    Thank you very much for your referral or Ms. Anastacio Anand to me for evaluation and treatment of her Hand & Wrist condition. I appreciate your confidence in me and thank you for allowing me the opportunity to care for your patients. If I can be of any further assistance to you on this or any other patient, please do not hesitate to contact me. Sincerely,    Jaja Walter.  Edin Juarez MD

## 2021-09-03 NOTE — PATIENT INSTRUCTIONS
Hand Fracture Instructions  After Cast is Removed    Dr. Caro Medeiros    1. Be cautions in resuming fulll activities and use of the hand for next 2 - 4 weeks. 2. If you were provided a brace, use is for more vigorous activity, but remove it for light activity and to perform the exercises lised below. 3. Perform the following exercises VIGOROUSLY at least four times a day. Exercises should be performed in the seated position with elbow on tabletop or other firm surface. If you cannot make these motions on your own, you may use other hand to assist in making these motions. A. Fully straighten fingers until hand is flat. Fully bend fingers until hand is in a full fist.   B. Bend wrist forward and backward (grasp hand around knuckles with other hand to do so). C. Rotate forearm so that your palm faces towards your face. Rotate forearm so that your palm faces away from your face (grasp hand around wrist with other hand to do so). D. Fully straighten elbow. Fully bend elbow. 4. Continue light use of the hand progressing to more normal us as it feels comfortable to do so.   5. In 2 - 4 weeks you may discontinue using the brace (if you were using one) and resume normal use of the hand and wrist if you have regained full and painless motion and function. 6. If you are unable to achieve  full and painless motion and function over 4 weeks, please call the office at [phone] to schedule a follow-up appointment with Dr. Sonia Martinez.

## 2021-09-07 RX ORDER — NAPROXEN 500 MG/1
TABLET ORAL
Qty: 60 TABLET | Refills: 2 | Status: SHIPPED | OUTPATIENT
Start: 2021-09-07

## 2021-09-08 ENCOUNTER — TELEPHONE (OUTPATIENT)
Dept: ORTHOPEDIC SURGERY | Age: 61
End: 2021-09-08

## 2021-09-27 RX ORDER — GABAPENTIN 300 MG/1
CAPSULE ORAL
Qty: 90 CAPSULE | Refills: 0 | Status: SHIPPED | OUTPATIENT
Start: 2021-09-27 | End: 2021-11-03

## 2021-10-01 ENCOUNTER — TELEPHONE (OUTPATIENT)
Dept: ORTHOPEDIC SURGERY | Age: 61
End: 2021-10-01

## 2021-10-01 NOTE — TELEPHONE ENCOUNTER
Pt was instructed to call back or make f/u appt if she was still having problems after a month. If not, pt may return to work with no restrictions.

## 2021-10-01 NOTE — TELEPHONE ENCOUNTER
WC patient was instructed to receive a follow up call from clinic in 4 weeks regarding left smal finger fracture. She may be reached at [phone].

## 2021-10-05 ENCOUNTER — OFFICE VISIT (OUTPATIENT)
Dept: FAMILY MEDICINE CLINIC | Age: 61
End: 2021-10-05
Payer: COMMERCIAL

## 2021-10-05 VITALS
TEMPERATURE: 98.2 F | HEIGHT: 66 IN | BODY MASS INDEX: 29.92 KG/M2 | SYSTOLIC BLOOD PRESSURE: 130 MMHG | DIASTOLIC BLOOD PRESSURE: 86 MMHG | WEIGHT: 186.2 LBS

## 2021-10-05 DIAGNOSIS — K21.9 GASTROESOPHAGEAL REFLUX DISEASE WITHOUT ESOPHAGITIS: ICD-10-CM

## 2021-10-05 DIAGNOSIS — F41.9 ANXIETY: ICD-10-CM

## 2021-10-05 DIAGNOSIS — R06.83 SNORING: ICD-10-CM

## 2021-10-05 DIAGNOSIS — R53.82 CHRONIC FATIGUE: Primary | ICD-10-CM

## 2021-10-05 PROCEDURE — 99214 OFFICE O/P EST MOD 30 MIN: CPT | Performed by: INTERNAL MEDICINE

## 2021-10-05 ASSESSMENT — ENCOUNTER SYMPTOMS
NAUSEA: 0
SHORTNESS OF BREATH: 0
DIARRHEA: 0
RHINORRHEA: 0
APNEA: 0
SINUS PRESSURE: 0
SINUS PAIN: 0
ABDOMINAL PAIN: 0
BLOOD IN STOOL: 0
COUGH: 0
CONSTIPATION: 0
WHEEZING: 0

## 2021-10-05 NOTE — PROGRESS NOTES
Immunization History   Administered Date(s) Administered    Influenza Virus Vaccine 09/28/2019    Influenza, MDCK Quadv, IM, PF (Flucelvax 2 yrs and older) 10/07/2020    Influenza, MDCK Quadv, with preserv IM (Flucelvax 2 yrs and older) 09/28/2019

## 2021-10-05 NOTE — PROGRESS NOTES
10/5/2021    Shari Sarmiento (:  1960) is a 63 y.o. female, here for a preventive medicine evaluation.   Chief Complaint   Patient presents with   [facility] Check-Up     patient request routine check-up and medication refills    Fatigue     patient c/o fatigue over the past couple of months; weight gain     Shari Sarmiento is a 63 y.o. female with the following history as recorded in [facility]:  Patient Active Problem List    Diagnosis Date Noted    Trigger index finger of right hand 2018    Primary osteoarthritis of first carpometacarpal joint of right hand 2018    Pain of right thumb 2018    Pink eye disease of right eye 2018    Incisional hernia, without obstruction or gangrene     Neck pain 2016    Abdominal wall pain 2016    Skull mass 2015    Skin lesion of face 2014    Skin lesion 2014    Trigeminal neuralgia 2014    Change in vision 10/14/2014    Pharyngitis 2014    Left foot pain 2014    Chills 2014    GERD (gastroesophageal reflux disease) 2014    Abdominal wall pain in epigastric region 2014    Nausea 2014    IBS (irritable bowel syndrome)     Anxiety disorder     Depression     Allergic rhinitis     HSV (herpes simplex virus) infection     SVT [facility]ricular tachycardia) ([facility])      Current Outpatient Medications   Medication Sig Dispense Refill    gabapentin (NEURONTIN) 300 MG capsule TAKE ONE CAPSULE BY MOUTH THREE TIMES A DAY 90 capsule 0    naproxen (NAPROSYN) 500 MG tablet TAKE ONE TABLET BY MOUTH TWICE A DAY WITH MEALS 60 tablet 2    QUEtiapine (SEROQUEL) 25 MG tablet TAKE ONE TO TWO TABLETS BY MOUTH ONCE NIGHTLY 60 tablet 5    hydrOXYzine (ATARAX) 25 MG tablet Take 1 tablet by mouth 3 times daily as needed for Anxiety 90 tablet 2    melatonin 3 MG TABS tablet Take 3 mg by mouth nightly as needed      cetirizine (ZYRTEC) 10 MG tablet Take 10 mg by mouth as needed  Calcium Carbonate-Vitamin D (CALTRATE 600+D PO) Take 1 tablet by mouth daily       Multiple Vitamins-Minerals (THERAPEUTIC MULTIVITAMIN-MINERALS) tablet Take 1 tablet by mouth daily      omeprazole (PRILOSEC) 20 MG capsule Take 20 mg by mouth daily as needed       senna (SENOKOT) 8.6 MG tablet Take 1 tablet by mouth daily as needed       diphenhydrAMINE (BENADRYL) 25 MG tablet Take 25 mg by mouth nightly as needed. No current facility-administered medications for this visit.      Allergies: Dye [iodides], Shellfish-derived products, Shrimp (diagnostic), Bactrim [sulfamethoxazole-trimethoprim], Paxil [paroxetine], Pcn [penicillins], Amoxicillin-pot clavulanate, Cephalexin, Clindamycin/lincomycin cross reactors, Erythromycin, Nortriptyline, Sertraline, Sulfa antibiotics, and Tetracyclines & related  Past Medical History:   Diagnosis Date    Allergic rhinitis     Anxiety disorder     Depression     HSV (herpes simplex virus) infection     IBS (irritable bowel syndrome)     [facility]     Stewart-Parkinson-White syndrome      Past Surgical History:   Procedure Laterality Date    APPENDECTOMY  07/05/1999    CATARACT REMOVAL WITH IMPLANT Left     CHOLECYSTECTOMY      COLONOSCOPY  03/2020    Dr. Sindy Aguilera    COLONOSCOPY N/A 3/5/2020    COLORECTAL CANCER SCREENING, NOT HIGH RISK performed by Anita Lyons MD at [address] [place] [address] 4/23/2021    EXCISION OF LARGE GANGLION CYST RIGHT FOOT AND ANKLE performed by Janee Mota DPM at [address] [facility]  6-8-16    with mesh    OTHER SURGICAL HISTORY      bone spur removed from front of skull    PARTIAL HYSTERECTOMY  07/16/1999    TONSILLECTOMY       Family History   Problem Relation Age of Onset    Heart Disease Father     Diabetes Father     No Known Problems Mother     Kidney Disease Paternal Aunt      Social History     Tobacco Use    Smoking status: Former Smoker     Quit date: 2008     Years since quittin.3    Smokeless tobacco: Never Used   Substance Use Topics    Alcohol use: Yes     Comment: social use     Patient Active Problem List   Diagnosis    IBS (irritable bowel syndrome)    Anxiety disorder    Depression    Allergic rhinitis    HSV (herpes simplex virus) infection    [facility]    GERD (gastroesophageal reflux disease)    Abdominal wall pain in epigastric region    Nausea    Pharyngitis    Left foot pain    Chills    Change in vision    Trigeminal neuralgia    Skin lesion of face    Skin lesion    Skull mass    Neck pain    Abdominal wall pain    Incisional hernia, without obstruction or gangrene    Pain of right thumb    Pink eye disease of right eye    Trigger index finger of right hand    Primary osteoarthritis of first carpometacarpal joint of right hand       Review of Systems   Constitutional: Positive for fatigue and unexpected weight change. Negative for chills, diaphoresis and fever. HENT: Negative for congestion, postnasal drip, rhinorrhea, sinus pressure and sinus pain. Eyes: Negative for visual disturbance. Respiratory: Negative for apnea, cough, shortness of breath and wheezing. Cardiovascular: Negative for chest pain and palpitations. Gastrointestinal: Negative for abdominal pain, blood in stool, constipation, diarrhea and nausea. Endocrine: Negative for polyuria. Genitourinary: Negative for dysuria and hematuria. Musculoskeletal: Negative for gait problem. Skin: Negative for rash. Neurological: Negative for dizziness, weakness, numbness and headaches. Hematological: Negative for adenopathy. Prior to Visit Medications    Medication Sig Taking?  Authorizing Provider   gabapentin (NEURONTIN) 300 MG capsule TAKE ONE CAPSULE BY MOUTH THREE TIMES A DAY Yes Yvonne Munoz DO   naproxen (NAPROSYN) 500 MG tablet TAKE ONE TABLET BY MOUTH TWICE A DAY WITH MEALS Yes Fannie Ndiaye DO   QUEtiapine (SEROQUEL) 25 MG tablet TAKE ONE TO TWO TABLETS BY MOUTH ONCE NIGHTLY Yes Candance Au, APRN - NP   hydrOXYzine (ATARAX) 25 MG tablet Take 1 tablet by mouth 3 times daily as needed for Anxiety Yes Candance Au, APRN - NP   melatonin 3 MG TABS tablet Take 3 mg by mouth nightly as needed Yes Historical Provider, MD   cetirizine (ZYRTEC) 10 MG tablet Take 10 mg by mouth as needed  Yes Historical Provider, MD   Calcium Carbonate-Vitamin D (CALTRATE 600+D PO) Take 1 tablet by mouth daily  Yes Historical Provider, MD   Multiple Vitamins-Minerals (THERAPEUTIC MULTIVITAMIN-MINERALS) tablet Take 1 tablet by mouth daily Yes Historical Provider, MD   omeprazole (PRILOSEC) 20 MG capsule Take 20 mg by mouth daily as needed  Yes Historical Provider, MD   senna (SENOKOT) 8.6 MG tablet Take 1 tablet by mouth daily as needed  Yes Historical Provider, MD   diphenhydrAMINE (BENADRYL) 25 MG tablet Take 25 mg by mouth nightly as needed.  Yes Historical Provider, MD        Allergies   Allergen Reactions    Dye [Iodides] Anaphylaxis and Shortness Of Breath     With shellfish in hives    Shellfish-Derived Products Anaphylaxis    Shrimp (Diagnostic) Anaphylaxis    Bactrim [Sulfamethoxazole-Trimethoprim]      Blisters,skin burns    Paxil [Paroxetine] Other (See Comments)     hallucuinations    Pcn [Penicillins]      Has never taken but stays away from due to cephalexin and keflex    Amoxicillin-Pot Clavulanate Rash    Cephalexin Rash    Clindamycin/Lincomycin Cross Reactors Rash    Erythromycin Rash    Nortriptyline Rash    Sertraline Rash     Skin peels    Sulfa Antibiotics Rash     Bactrim-burns skin    Tetracyclines & Related Rash       Past Medical History:   Diagnosis Date    Allergic rhinitis     Anxiety disorder     Depression     HSV (herpes simplex virus) infection     IBS (irritable bowel syndrome)     [facility]     Stewart-Parkinson-White syndrome        Past Surgical History:   Procedure Laterality Date    APPENDECTOMY  1999    CATARACT REMOVAL WITH IMPLANT Left     CHOLECYSTECTOMY      COLONOSCOPY  2020    Dr. Moreno [facility] N/A 3/5/2020    COLORECTAL CANCER SCREENING, NOT HIGH RISK performed by David Shay MD at [address] [facility] 2021    EXCISION OF LARGE GANGLION CYST RIGHT FOOT AND ANKLE performed by Neha Ochoa DPM at [address] [facility]  16    with mesh    OTHER SURGICAL HISTORY      bone spur removed from front of skull    PARTIAL HYSTERECTOMY  1999    TONSILLECTOMY         Social History     Socioeconomic History    Marital status:      Spouse name: Not on file    Number of children: Not on file    Years of education: Not on file    Highest education level: Not on file   Occupational History    Not on file   Tobacco Use    Smoking status: Former Smoker     Quit date: 2008     Years since quittin.3    Smokeless tobacco: Never Used   Vaping Use    Vaping Use: Never used   Substance and Sexual Activity    Alcohol use: Yes     Comment: social use    Drug use: Never    Sexual activity: Not on file   Other Topics Concern    Not on file   Social History Narrative    Not on file     Social Determinants of Health     Financial Resource Strain: Low Risk     Difficulty of Paying Living Expenses: Not hard at all   Food Insecurity: No Food Insecurity    Worried About [facility] in the Last Year: Never true    [facility] in the Last Year: Never true   Transportation Needs: No Transportation Needs    Lack of Transportation (Medical): No    Lack of Transportation (Non-Medical):  No   Physical Activity:     Days of Exercise per Week:     Minutes of Exercise per Session:    Stress:     Feeling of Stress :    Social Connections:     Frequency of Communication with Friends and Family:     Frequency of Social Gatherings with Friends and Family:     Attends Voodoo Services:     Active Member of Clubs or Organizations:     Attends Club or Organization Meetings:     Marital Status:    Intimate Partner Violence:     Fear of Current or Ex-Partner:     Emotionally Abused:     Physically Abused:     Sexually Abused:         Family History   Problem Relation Age of Onset    Heart Disease Father     Diabetes Father     No Known Problems Mother     Kidney Disease Paternal Aunt        ADVANCE DIRECTIVE: N, <no information>    Vitals:    10/05/21 1020   BP: 130/86   Site: Left Upper Arm   Position: Sitting   Cuff Size: Medium Adult   Temp: 98.2 °F (36.8 °C)   TempSrc: Temporal   Weight: 186 lb 3.2 oz (84.5 kg)   Height: 5' 6\" (1.676 m)     Estimated body mass index is 30.05 kg/m² as calculated from the following:    Height as of this encounter: 5' 6\" (1.676 m). Weight as of this encounter: 186 lb 3.2 oz (84.5 kg). Physical Exam  Vitals and nursing note reviewed. Constitutional:       General: She is not in acute distress. Appearance: Normal appearance. She is not ill-appearing. HENT:      Head: Normocephalic and atraumatic. Right Ear: Tympanic membrane and ear canal normal.      Left Ear: Tympanic membrane and ear canal normal.   Eyes:      General:         Right eye: No discharge. Left eye: No discharge. Extraocular Movements: Extraocular movements intact. Pupils: Pupils are equal, round, and reactive to light. Cardiovascular:      Rate and Rhythm: Normal rate and regular rhythm. Heart sounds: No murmur heard. Pulmonary:      Effort: No respiratory distress. Breath sounds: No wheezing. Abdominal:      General: There is no distension. Palpations: There is no mass. Tenderness: There is no abdominal tenderness. There is no guarding or rebound. Musculoskeletal:         General: No swelling.       Cervical back: No rigidity. Lymphadenopathy:      Cervical: No cervical adenopathy. Skin:     Coloration: Skin is not jaundiced. Findings: No bruising. Neurological:      General: No focal deficit present. Mental Status: She is alert and oriented to person, place, and time. Psychiatric:         Mood and Affect: Mood normal.         No flowsheet data found. Lab Results   Component Value Date    CHOL 217 03/27/2017    TRIG 106 03/27/2017    HDL 58 03/27/2017    LDLCALC 138 03/27/2017    GLUCOSE 99 07/10/2020       The ASCVD Risk score (Brando Beltran, et al., 2013) failed to calculate for the following reasons:    Cannot find a previous HDL lab    Cannot find a previous total cholesterol lab    Immunization History   Administered Date(s) Administered    Influenza Virus Vaccine 09/28/2019    Influenza, MDCK Quadv, IM, PF (Flucelvax 2 yrs and older) 10/07/2020    Influenza, MDCK Quadv, with preserv IM (Flucelvax 2 yrs and older) 09/28/2019       Health Maintenance   Topic Date Due    Hepatitis C screen  Never done    COVID-19 Vaccine (1) Never done    HIV screen  Never done    DTaP/Tdap/Td vaccine (1 - Tdap) Never done    Shingles Vaccine (1 of 2) Never done    Flu vaccine (1) 09/01/2021    Lipid screen  03/27/2022    Breast cancer screen  03/23/2023    Colon cancer screen colonoscopy  03/05/2030    Hepatitis A vaccine  Aged Out    Hepatitis B vaccine  Aged Out    Hib vaccine  Aged Out    Meningococcal (ACWY) vaccine  Aged Out    Pneumococcal 0-64 years Vaccine  Aged Out          ASSESSMENT/PLAN:   Diagnosis Orders   1. Chronic fatigue  TSH without Reflex    CBC Auto Differential   2. Gastroesophageal reflux disease without esophagitis     3. Anxiety     4.  Snoring     will refer to sleep medicine       An electronic signature was used to authenticate this note.    --Tae Gilbert, DO on 10/5/2021 at 10:27 AM

## 2021-10-13 ENCOUNTER — TELEPHONE (OUTPATIENT)
Dept: ORTHOPEDIC SURGERY | Age: 61
End: 2021-10-13

## 2021-10-13 ENCOUNTER — PATIENT MESSAGE (OUTPATIENT)
Dept: ORTHOPEDIC SURGERY | Age: 61
End: 2021-10-13

## 2021-10-13 NOTE — TELEPHONE ENCOUNTER
Voice mail left for pt to either make appointment to come see us or if she would like we can arranged therapy for her.

## 2021-10-13 NOTE — TELEPHONE ENCOUNTER
Appointment Request     Patient requesting earlier appointment: Yes  Appointment offered to patient: yes  Patient Contact Number: [phone]  Pt stated that her pinky finger is swollen and sore.  Would like to receive a call from clinic. [phone]

## 2021-10-18 ENCOUNTER — OFFICE VISIT (OUTPATIENT)
Dept: SLEEP MEDICINE | Age: 61
End: 2021-10-18
Payer: COMMERCIAL

## 2021-10-18 VITALS
HEIGHT: 66 IN | SYSTOLIC BLOOD PRESSURE: 120 MMHG | BODY MASS INDEX: 30.05 KG/M2 | RESPIRATION RATE: 16 BRPM | DIASTOLIC BLOOD PRESSURE: 70 MMHG | WEIGHT: 187 LBS | HEART RATE: 64 BPM | TEMPERATURE: 97.4 F | OXYGEN SATURATION: 97 %

## 2021-10-18 DIAGNOSIS — G47.33 OBSTRUCTIVE SLEEP APNEA: Primary | ICD-10-CM

## 2021-10-18 DIAGNOSIS — R53.83 FATIGUE, UNSPECIFIED TYPE: ICD-10-CM

## 2021-10-18 DIAGNOSIS — R06.83 SNORING: ICD-10-CM

## 2021-10-18 DIAGNOSIS — E66.9 OBESITY (BMI 30.0-34.9): ICD-10-CM

## 2021-10-18 PROCEDURE — 99204 OFFICE O/P NEW MOD 45 MIN: CPT | Performed by: PSYCHIATRY & NEUROLOGY

## 2021-10-18 ASSESSMENT — SLEEP AND FATIGUE QUESTIONNAIRES
ESS TOTAL SCORE: 4
NECK CIRCUMFERENCE (INCHES): 15
HOW LIKELY ARE YOU TO NOD OFF OR FALL ASLEEP WHILE SITTING AND READING: 2
HOW LIKELY ARE YOU TO NOD OFF OR FALL ASLEEP WHILE LYING DOWN TO REST IN THE AFTERNOON WHEN CIRCUMSTANCES PERMIT: 0
HOW LIKELY ARE YOU TO NOD OFF OR FALL ASLEEP IN A CAR, WHILE STOPPED FOR A FEW MINUTES IN TRAFFIC: 0
HOW LIKELY ARE YOU TO NOD OFF OR FALL ASLEEP WHILE SITTING QUIETLY AFTER LUNCH WITHOUT ALCOHOL: 0
HOW LIKELY ARE YOU TO NOD OFF OR FALL ASLEEP WHILE SITTING AND TALKING TO SOMEONE: 0
HOW LIKELY ARE YOU TO NOD OFF OR FALL ASLEEP WHILE SITTING INACTIVE IN A PUBLIC PLACE: 0
HOW LIKELY ARE YOU TO NOD OFF OR FALL ASLEEP WHEN YOU ARE A PASSENGER IN A CAR FOR AN HOUR WITHOUT A BREAK: 0
HOW LIKELY ARE YOU TO NOD OFF OR FALL ASLEEP WHILE WATCHING TV: 2

## 2021-10-18 ASSESSMENT — ENCOUNTER SYMPTOMS
ALLERGIC/IMMUNOLOGIC NEGATIVE: 1
EYES NEGATIVE: 1
RESPIRATORY NEGATIVE: 1
GASTROINTESTINAL NEGATIVE: 1
APNEA: 0
SORE THROAT: 1

## 2021-10-18 NOTE — PATIENT INSTRUCTIONS
Orders Placed This Encounter   Procedures    Baseline Diagnostic Sleep Study     Standing Status:   Future     Standing Expiration Date:   10/18/2022     Order Specific Question:   Adult or Pediatric     Answer:   Adult Study (>7 Years)     Order Specific Question:   Location For Sleep Study     Answer:   Given     Order Specific Question:   Select Sleep Lab Location     Answer:   [facility]    Sleep Study with PAP Titration     Standing Status:   Future     Standing Expiration Date:   10/18/2022     Order Specific Question:   Sleep Study Titration Type     Answer:   CPAP     Order Specific Question:   Location For Sleep Study     Answer:   Given     Order Specific Question:   Select Sleep Lab Location     Answer:   [facility]        Patient Education        Sleep Apnea: Care Instructions  Overview     Sleep apnea means that you frequently stop breathing for 10 seconds or longer during sleep. It can be mild to severe, based on the number of times an hour that you stop breathing. Blocked or narrowed airways in your nose, mouth, or throat can cause sleep apnea. Your airway can become blocked when your throat muscles and tongue relax during sleep. You can help treat sleep apnea at home by making lifestyle changes. You also can use a CPAP breathing machine that keeps tissues in the throat from blocking your airway. Or your doctor may suggest that you use a breathing device while you sleep. It helps keep your airway open. This could be a device that you put in your mouth. In some cases, surgery may be needed to remove enlarged tissues in the throat. Follow-up care is a key part of your treatment and safety. Be sure to make and go to all appointments, and call your doctor if you are having problems. It's also a good idea to know your test results and keep a list of the medicines you take. How can you care for yourself at home? · Lose weight, if needed. · Sleep on your side.  It may help mild apnea.  · Avoid alcohol and medicines such as sleeping pills, opioids, or sedatives before bed. · Don't smoke. If you need help quitting, talk to your doctor. · Prop up the head of your bed. · Treat breathing problems, such as a stuffy nose, that are caused by a cold or allergies. · Try a continuous positive airway pressure (CPAP) breathing machine if your doctor recommends it. · If CPAP doesn't work for you, ask your doctor if you can try other masks, settings, or breathing machines. · Try oral breathing devices or other nasal devices. · Talk to your doctor if your nose feels dry or bleeds, or if it gets runny or stuffy when you use a breathing machine. · Tell your doctor if you're sleepy during the day and it affects your daily life. Don't drive or operate machinery when you're drowsy. When should you call for help? Watch closely for changes in your health, and be sure to contact your doctor if:    · You still have sleep apnea even though you have made lifestyle changes.     · You are thinking of trying a device such as CPAP.     · You are having problems using a CPAP or similar machine.     · You are still sleepy during the day, and it affects your daily life. Where can you learn more? Go to [email or web address] org and sign in to your [employer] account. Enter C923 in the [facility] box to learn more about \"Sleep Apnea: Care Instructions. \"     If you do not have an account, please click on the \"Sign Up Now\" link. Current as of: July 6, 2021               Content Version: 13.0  © 2006-2021 [employer] Inc[employer]. Care instructions adapted under license by [facility] [employer] [facility] ([facility]). If you have questions about a medical condition or this instruction, always ask your healthcare professional. [place] [address] any warranty or liability for your use of this information.

## 2021-10-18 NOTE — PROGRESS NOTES
MD JUDE Mccoy Board Certified in Sleep Medicine  Certified [facility] Sleep Medicine  Board Certified in Neurology [address]  [address] [facility] [address] [facility], [facility] 232 ([address]209 [facility], [address]           [address]  [address] [facility] [address] [phone]    Subjective:     Patient ID: Duarte Alexander is a 63 y.o. female. Chief Complaint   Patient presents with    New Patient     snores tired        HPI:        Duarte Alexander is a 63 y.o. female referred by Dr. Amadeo Trotter for a sleep evaluation. She complains of snoring, snorting, tossing and turning, kicking, excessive daytime sleepiness, feels sleepy during the day but she denies choking, knees buckling with laughing, completely or partially paralyzed while falling asleep or waking up, noisy environment, uncomfortable room temperature, uncomfortable bedding. Symptoms began several years ago, gradually worsening since that time. The patient's bed-partner confirmed the snoring and stopped breathing at night  SLEEP SCHEDULE: Goes to bed around 9-10 PM in the weekdays and 9-10 PM in the weekends. It usually takes the patient 5-10 minutes to fall asleep. The patient gets up 1 per night to go to the bathroom. The Patient finally gets up at 4:45 AM during the weekdays and 4:45-7 AM in the weekends. patient wakes up with  sometimes morning headache. . the headache usually dull headache. The patient has restless sleep with frequent arousals in addition to the Patient has significant daytime sleepiness. The Patient scored Total score: 4 on [place] Sleepiness Scale ( more than 10 is indicative of daytime sleepiness)and 29 in fatigue scale ( more than 36 is indicative of daytime fatigue). The patient takes no naps. Previous evaluation and treatment has included- none.     The Patient has been obese for many years and tried, has not gained weight in the last 5 years,      DOT/CDL - N/A  AMIE/Jen - N/A      Previous Report(s) Reviewed: historical medical records       Social History     Socioeconomic History    Marital status:      Spouse name: Not on file    Number of children: Not on file    Years of education: Not on file    Highest education level: Not on file   Occupational History    Not on file   Tobacco Use    Smoking status: Former Smoker     Quit date: 2008     Years since quittin.3    Smokeless tobacco: Never Used   Vaping Use    Vaping Use: Never used   Substance and Sexual Activity    Alcohol use: Yes     Comment: social use    Drug use: Never    Sexual activity: Not on file   Other Topics Concern    Not on file   Social History Narrative    Not on file     Social Determinants of Health     Financial Resource Strain: Low Risk     Difficulty of Paying Living Expenses: Not hard at all   Food Insecurity: No Food Insecurity    Worried About [address] [facility] in the Last Year: Never true    [address] in the Last Year: Never true   Transportation Needs: No Transportation Needs    Lack of Transportation (Medical): No    Lack of Transportation (Non-Medical): No   Physical Activity:     Days of Exercise per Week:     Minutes of Exercise per Session:    Stress:     Feeling of Stress :    Social Connections:     Frequency of Communication with Friends and Family:     Frequency of Social Gatherings with Friends and Family:     Attends Taoist Services:     Active Member of Clubs or Organizations:     Attends Club or Organization Meetings:     Marital Status:    Intimate Partner Violence:     Fear of Current or Ex-Partner:     Emotionally Abused:     Physically Abused:     Sexually Abused:        Prior to Admission medications    Medication Sig Start Date End Date Taking?  Authorizing Provider   gabapentin (NEURONTIN) 300 MG capsule TAKE ONE CAPSULE BY MOUTH THREE TIMES A DAY 9/27/21 10/27/21 Yes Chester العلي,    naproxen (NAPROSYN) 500 MG tablet TAKE ONE TABLET BY MOUTH TWICE A DAY WITH MEALS 9/7/21  Yes Chester العلي, DO   QUEtiapine (SEROQUEL) 25 MG tablet TAKE ONE TO TWO TABLETS BY MOUTH ONCE NIGHTLY 8/16/21  Yes Lucas Morton, APRN - NP   hydrOXYzine (ATARAX) 25 MG tablet Take 1 tablet by mouth 3 times daily as needed for Anxiety 5/26/21  Yes Lucas Morton APRN - NP   melatonin 3 MG TABS tablet Take 3 mg by mouth nightly as needed   Yes Historical Provider, MD   cetirizine (ZYRTEC) 10 MG tablet Take 10 mg by mouth as needed    Yes Historical Provider, MD   Calcium Carbonate-Vitamin D (CALTRATE 600+D PO) Take 1 tablet by mouth daily    Yes Historical Provider, MD   Multiple Vitamins-Minerals (THERAPEUTIC MULTIVITAMIN-MINERALS) tablet Take 1 tablet by mouth daily   Yes Historical Provider, MD   omeprazole (PRILOSEC) 20 MG capsule Take 20 mg by mouth daily as needed    Yes Historical Provider, MD   senna (SENOKOT) 8.6 MG tablet Take 1 tablet by mouth daily as needed    Yes Historical Provider, MD   diphenhydrAMINE (BENADRYL) 25 MG tablet Take 25 mg by mouth nightly as needed.    Yes Historical Provider, MD       Allergies as of 10/18/2021 - Fully Reviewed 10/18/2021   Allergen Reaction Noted    Dye [iodides] Anaphylaxis and Shortness Of Breath 06/20/2013    Shellfish-derived products Anaphylaxis 04/20/2021    Shrimp (diagnostic) Anaphylaxis 04/20/2021    Bactrim [sulfamethoxazole-trimethoprim]  04/20/2021    Paxil [paroxetine] Other (See Comments) 06/20/2013    Pcn [penicillins]  06/20/2013    Amoxicillin-pot clavulanate Rash 06/08/2016    Cephalexin Rash 06/20/2013    Clindamycin/lincomycin cross reactors Rash 06/20/2013    Erythromycin Rash 06/20/2013    Nortriptyline Rash     Sertraline Rash     Sulfa antibiotics Rash 06/20/2013    Tetracyclines & related Rash 06/20/2013       Patient Active Problem List Diagnosis    IBS (irritable bowel syndrome)    Anxiety disorder    Depression    Allergic rhinitis    HSV (herpes simplex virus) infection    SVT (supraventricular tachycardia) (HCC)    GERD (gastroesophageal reflux disease)    Abdominal wall pain in epigastric region    Nausea    Pharyngitis    Left foot pain    Chills    Change in vision    Trigeminal neuralgia    Skin lesion of face    Skin lesion    Skull mass    Neck pain    Abdominal wall pain    Incisional hernia, without obstruction or gangrene    Pain of right thumb    Pink eye disease of right eye    Trigger index finger of right hand    Primary osteoarthritis of first carpometacarpal joint of right hand       Past Medical History:   Diagnosis Date    Allergic rhinitis     Anxiety disorder     Depression     HSV (herpes simplex virus) infection     IBS (irritable bowel syndrome)     SVT (supraventricular tachycardia) (HCC)     Stewart-Parkinson-White syndrome        Past Surgical History:   Procedure Laterality Date    APPENDECTOMY  07/05/1999    CATARACT REMOVAL WITH IMPLANT Left     CHOLECYSTECTOMY      COLONOSCOPY  03/2020    Dr. Mikel Dexter N/A 3/5/2020    COLORECTAL CANCER SCREENING, NOT HIGH RISK performed by Adrianna Bazzi MD at [address] [facility] 4/23/2021    EXCISION OF LARGE GANGLION CYST RIGHT FOOT AND ANKLE performed by Dimitry Vega DPM at [address] [facility]  6-8-16    with mesh    OTHER SURGICAL HISTORY      bone spur removed from front of skull    PARTIAL HYSTERECTOMY  07/16/1999    TONSILLECTOMY         Family History   Problem Relation Age of Onset    Heart Disease Father     Diabetes Father     No Known Problems Mother     Kidney Disease Paternal Aunt        Review of Systems   Constitutional: Positive for unexpected weight change. HENT: Positive for congestion, hearing loss and sore throat.     Eyes: Negative. Respiratory: Negative. Negative for apnea. Cardiovascular: Negative. Gastrointestinal: Negative. Endocrine: Negative. Genitourinary: Negative. Frequency: 1. Musculoskeletal: Negative. Skin: Negative. Allergic/Immunologic: Negative. Neurological: Negative. Hematological: Negative. Psychiatric/Behavioral: Positive for decreased concentration, dysphoric mood and sleep disturbance. The patient is nervous/anxious. Objective:     Vitals:  Weight BMI Neck circumference    Wt Readings from Last 3 Encounters:   10/18/21 187 lb (84.8 kg)   10/05/21 186 lb 3.2 oz (84.5 kg)   09/03/21 180 lb (81.6 kg)    Body mass index is 30.18 kg/m². Neck circumference: 15     BP HR SaO2   BP Readings from Last 3 Encounters:   10/18/21 120/70   10/05/21 130/86   08/03/21 (!) 143/79    Pulse Readings from Last 3 Encounters:   10/18/21 64   08/03/21 78   04/23/21 66    SpO2 Readings from Last 3 Encounters:   10/18/21 97%   08/03/21 98%   04/23/21 96%        The mandibular molar Class :   [x]1 []2 []3      Mallampati I Airway Classification:   []1 []2 [x]3 []4        Physical Exam  Vitals and nursing note reviewed. Constitutional:       Appearance: Normal appearance. HENT:      Head: Atraumatic. Nose: Nose normal.      Mouth/Throat:      Comments: Mallampati class 3, no retrognathia or hypognathia , normal airflow in bilateral nostrils, no septum deviation , crowded oropharynx with low soft palate, no tonsils enlargement. Eyes:      Extraocular Movements: Extraocular movements intact. Cardiovascular:      Rate and Rhythm: Normal rate and regular rhythm. Heart sounds: Normal heart sounds. Pulmonary:      Effort: Pulmonary effort is normal.      Breath sounds: Normal breath sounds. Musculoskeletal:         General: Normal range of motion. Cervical back: Normal range of motion and neck supple. Skin:     General: Skin is warm.    Neurological:      General: No focal deficit present. Psychiatric:         Mood and Affect: Mood normal.         Assessment:   Obstructive sleep apnea especially with snoring, snorting,  daytime sleepiness, large neck circumference, Mallampati class of 3 and obesity. On sedatives medications (Seroqel, Gabapentin, Benadryl, Atarax)             Diagnosis Orders   1. Obstructive sleep apnea  Baseline Diagnostic Sleep Study    Sleep Study with PAP Titration   2. Snoring  Baseline Diagnostic Sleep Study   3. Fatigue, unspecified type  Baseline Diagnostic Sleep Study   4. Obesity (BMI 30.0-34. 9)  Baseline Diagnostic Sleep Study     Plan:     Patient was counseled about the pathophysiology of obstructive sleep apnea syndrome and the methods for evaluating its presence and severity. Patient was counseled to avoid driving and other potentially hazardous circumstances if the patient is experiencing excessive sleepiness. Treatment considerations include the use of nasal CPAP, oral dental appliance or a surgical intervention, which should be based on otolarygologic findings, In the meantime, the patient should be cautioned to avoid the use of alcohol or other depressant medications because of potential for increasing the duration and severity of apnea and cautioned regarding driving or operating and dangerous equipment if the patient is experiencing daytime sleepiness. .        Orders Placed This Encounter   Procedures    Baseline Diagnostic Sleep Study    Sleep Study with PAP Titration       Return in about 3 months (around 1/18/2022) for to review the PSG and CPAP usage, Reveiwing CPAP usage and compliance report and tro.     Mary King MD  Medical Director [facility]

## 2021-10-22 NOTE — TELEPHONE ENCOUNTER
[employer] entered, need [facility] info. [15 min] : 15 min [HIV Education] : HIV Education [Transmission] : transmission [Universal Precautions] : universal precautions [Treatment Education] : treatment education [Treatment Adherence] : treatment adherence [Anticipatory Guidance] : anticipatory guidance [Sexuality/Safer Sex] : sexuality/safer sex [Alarm Reminder] : alarm reminder [HIV info] : HIV info [Condoms] : condoms [Risk Reduction] : risk reduction [PrEP/PEP] : PrEP/PEP [The Topics Listed Above] : the topics listed above [The patient was able to ask questions and explain these concepts in his/her own words] : the patient was able to ask questions and explain these concepts in his/her own words [Partner Notification Info/Discussion] : partner notification info/discussion

## 2021-11-03 RX ORDER — GABAPENTIN 300 MG/1
CAPSULE ORAL
Qty: 90 CAPSULE | Refills: 0 | Status: SHIPPED | OUTPATIENT
Start: 2021-11-03 | End: 2021-12-03

## 2021-12-03 RX ORDER — GABAPENTIN 300 MG/1
CAPSULE ORAL
Qty: 90 CAPSULE | Refills: 0 | Status: SHIPPED | OUTPATIENT
Start: 2021-12-03 | End: 2022-01-10 | Stop reason: SDUPTHER

## 2021-12-15 ENCOUNTER — CLINICAL DOCUMENTATION (OUTPATIENT)
Dept: OTHER | Age: 61
End: 2021-12-15

## 2022-01-10 NOTE — TELEPHONE ENCOUNTER
----- Message from Lisa Onofre sent at 1/10/2022  2:11 PM EST -----  Subject: Refill Request    QUESTIONS  Name of Medication? gabapentin (NEURONTIN) 300 MG capsule  Patient-reported dosage and instructions? 300 mg 3x a day   How many days do you have left? 3  Preferred Pharmacy? [facility]  Pharmacy phone number (if available)? [phone]  ---------------------------------------------------------------------------  --------------  Al DOAN  What is the best way for the office to contact you? OK to leave message on   voicemail  Preferred Call Back Phone Number?  [phone]

## 2022-01-11 RX ORDER — GABAPENTIN 300 MG/1
300 CAPSULE ORAL 3 TIMES DAILY
Qty: 90 CAPSULE | Refills: 0 | Status: SHIPPED | OUTPATIENT
Start: 2022-01-11 | End: 2022-02-07

## 2022-01-12 ENCOUNTER — TELEPHONE (OUTPATIENT)
Dept: FAMILY MEDICINE CLINIC | Age: 62
End: 2022-01-12

## 2022-01-12 NOTE — TELEPHONE ENCOUNTER
----- Message from David Morse sent at 1/12/2022  8:44 AM EST -----  Subject: Appointment Request    Reason for Call: Urgent Cough Cold    QUESTIONS  Type of Appointment? Established Patient  Reason for appointment request? No appointments available during search  Additional Information for Provider? Patient called in states she has a   sinus infection states she needs to be seen today screened red please call     ---------------------------------------------------------------------------  --------------  CALL BACK INFO  What is the best way for the office to contact you? OK to leave message on   voicemail  Preferred Call Back Phone Number?  [phone]  ---------------------------------------------------------------------------  --------------  SCRIPT ANSWERS

## 2022-01-18 ENCOUNTER — HOSPITAL ENCOUNTER (OUTPATIENT)
Dept: GENERAL RADIOLOGY | Age: 62
Discharge: HOME | End: 2022-01-18
Payer: COMMERCIAL

## 2022-01-18 ENCOUNTER — VIRTUAL VISIT (OUTPATIENT)
Dept: FAMILY MEDICINE CLINIC | Age: 62
End: 2022-01-18
Payer: COMMERCIAL

## 2022-01-18 ENCOUNTER — HOSPITAL ENCOUNTER (OUTPATIENT)
Age: 62
Discharge: HOME | End: 2022-01-18
Payer: COMMERCIAL

## 2022-01-18 DIAGNOSIS — J40 BRONCHITIS: ICD-10-CM

## 2022-01-18 DIAGNOSIS — J40 BRONCHITIS: Primary | ICD-10-CM

## 2022-01-18 PROCEDURE — 71046 X-RAY EXAM CHEST 2 VIEWS: CPT

## 2022-01-18 PROCEDURE — 99213 OFFICE O/P EST LOW 20 MIN: CPT | Performed by: INTERNAL MEDICINE

## 2022-01-18 ASSESSMENT — ENCOUNTER SYMPTOMS
BLOOD IN STOOL: 0
NAUSEA: 0
CONSTIPATION: 0
SHORTNESS OF BREATH: 1
APNEA: 0
ABDOMINAL PAIN: 0
DIARRHEA: 0
WHEEZING: 0

## 2022-01-18 NOTE — PROGRESS NOTES
1/18/2022    TELEHEALTH EVALUATION -- Audio/Visual (During KQHQH-38 public health emergency)    HPI:  Chief Complaint   Patient presents with    URI     ph. [phone]. follow up bronchitis- patient c/o headache, non-productive cough, sore throat, right and left ear pain, discomfort in chest from coughing, loose bowels. patient tested negative for Covid-19 and Infuenza on 1/12/2022. patient was prescribed an Albuterol inhaler, Medrol Dose Pack, E-mycin 250mg and a cough syrup on 1/13/2022 from an Urgent Care. patient has taken OTC Tylenol. patient has multiple allergies to antibiotics . She was placed on a zpac  Cleo Suarez is a 63 y.o. female with the following history as recorded in EpicCare:  Patient Active Problem List    Diagnosis Date Noted    Trigger index finger of right hand 05/07/2018    Primary osteoarthritis of first carpometacarpal joint of right hand 05/07/2018    Pain of right thumb 03/07/2018    Pink eye disease of right eye 03/07/2018    Incisional hernia, without obstruction or gangrene     Neck pain 05/23/2016    Abdominal wall pain 05/23/2016    Skull mass 02/13/2015    Skin lesion of face 12/12/2014    Skin lesion 12/12/2014    Trigeminal neuralgia 11/11/2014    Change in vision 10/14/2014    Pharyngitis 07/01/2014    Left foot pain 07/01/2014    Chills 07/01/2014    GERD (gastroesophageal reflux disease) 04/01/2014    Abdominal wall pain in epigastric region 04/01/2014    Nausea 04/01/2014    IBS (irritable bowel syndrome)     Anxiety disorder     Depression     Allergic rhinitis     HSV (herpes simplex virus) infection     [facility]      Current Outpatient Medications   Medication Sig Dispense Refill    gabapentin (NEURONTIN) 300 MG capsule Take 1 capsule by mouth 3 times daily for 30 days.  90 capsule 0    naproxen (NAPROSYN) 500 MG tablet TAKE ONE TABLET BY MOUTH TWICE A DAY WITH MEALS 60 tablet 2    QUEtiapine (SEROQUEL) 25 MG tablet TAKE ONE TO TWO TABLETS BY MOUTH ONCE NIGHTLY 60 tablet 5    hydrOXYzine (ATARAX) 25 MG tablet Take 1 tablet by mouth 3 times daily as needed for Anxiety 90 tablet 2    melatonin 3 MG TABS tablet Take 3 mg by mouth nightly as needed      cetirizine (ZYRTEC) 10 MG tablet Take 10 mg by mouth as needed       Calcium Carbonate-Vitamin D (CALTRATE 600+D PO) Take 1 tablet by mouth daily       Multiple Vitamins-Minerals (THERAPEUTIC MULTIVITAMIN-MINERALS) tablet Take 1 tablet by mouth daily      omeprazole (PRILOSEC) 20 MG capsule Take 20 mg by mouth daily as needed       senna (SENOKOT) 8.6 MG tablet Take 1 tablet by mouth daily as needed       diphenhydrAMINE (BENADRYL) 25 MG tablet Take 25 mg by mouth nightly as needed. No current facility-administered medications for this visit.      Allergies: Dye [iodides], Shellfish-derived products, Shrimp (diagnostic), Bactrim [sulfamethoxazole-trimethoprim], Paxil [paroxetine], Pcn [penicillins], Amoxicillin-pot clavulanate, Cephalexin, Clindamycin/lincomycin cross reactors, Erythromycin, Nortriptyline, Sertraline, Sulfa antibiotics, and Tetracyclines & related  Past Medical History:   Diagnosis Date    Allergic rhinitis     Anxiety disorder     Depression     HSV (herpes simplex virus) infection     IBS (irritable bowel syndrome)     [facility]     Stewart-Parkinson-White syndrome      Past Surgical History:   Procedure Laterality Date    APPENDECTOMY  07/05/1999    CATARACT REMOVAL WITH IMPLANT Left     CHOLECYSTECTOMY      COLONOSCOPY  03/2020    Dr. Damaris Castelan    COLONOSCOPY N/A 3/5/2020    COLORECTAL CANCER SCREENING, NOT HIGH RISK performed by Bobby Romero MD at [facility] 4/23/2021    EXCISION OF LARGE GANGLION CYST RIGHT FOOT AND ANKLE performed by Jessica Patel DPM at [address] [facility]  6-8-16    with mesh   [facility] OTHER SURGICAL HISTORY      bone spur removed from front of skull    PARTIAL HYSTERECTOMY  1999    TONSILLECTOMY       Family History   Problem Relation Age of Onset    Heart Disease Father     Diabetes Father     No Known Problems Mother     Kidney Disease Paternal Aunt      Social History     Tobacco Use    Smoking status: Former Smoker     Quit date: 2008     Years since quittin.5    Smokeless tobacco: Never Used   Substance Use Topics    Alcohol use: Yes     Comment: social use       Sarah Beth Garcia (:  1960) has requested an audio/video evaluation for the following concern(s):    Chief Complaint   Patient presents with    URI     ph. [phone]. follow up bronchitis- patient c/o headache, non-productive cough, sore throat, right and left ear pain, discomfort in chest from coughing, loose bowels. patient tested negative for Covid-19 and Infuenza on 2022. patient was prescribed an Albuterol inhaler, Medrol Dose Pack, E-mycin 250mg and a cough syrup on 2022 from an Urgent Care. patient has taken OTC Tylenol. Review of Systems   Constitutional: Negative for chills, diaphoresis and fatigue. HENT:        Patient presents with:  URI: ph. [phone]. follow up bronchitis- patient c/o headache, non-productive cough, sore throat, right and left ear pain, discomfort in chest from coughing, loose bowels. patient tested negative for Covid-19 and Infuenza on 2022. patient was prescribed an Albuterol inhaler, Medrol Dose Pack, E-mycin 250mg and a cough syrup on 2022 from an Urgent Care. patient has taken OTC Tylenol. Respiratory: Positive for shortness of breath. Negative for apnea and wheezing. Sob with activity not sob at rest   Cardiovascular: Negative for chest pain and palpitations. Gastrointestinal: Negative for abdominal pain, blood in stool, constipation, diarrhea and nausea. Genitourinary: Negative for dysuria and frequency. Prior to Visit Medications    Medication Sig Taking? Authorizing Provider   gabapentin (NEURONTIN) 300 MG capsule Take 1 capsule by mouth 3 times daily for 30 days. Yes Jayson Rosas,    naproxen (NAPROSYN) 500 MG tablet TAKE ONE TABLET BY MOUTH TWICE A DAY WITH MEALS Yes Jayson Rosas,    QUEtiapine (SEROQUEL) 25 MG tablet TAKE ONE TO TWO TABLETS BY MOUTH ONCE NIGHTLY Yes PATRICK Alva NP   hydrOXYzine (ATARAX) 25 MG tablet Take 1 tablet by mouth 3 times daily as needed for Anxiety Yes PATRICK Alva NP   melatonin 3 MG TABS tablet Take 3 mg by mouth nightly as needed Yes Historical Provider, MD   cetirizine (ZYRTEC) 10 MG tablet Take 10 mg by mouth as needed  Yes Historical Provider, MD   Calcium Carbonate-Vitamin D (CALTRATE 600+D PO) Take 1 tablet by mouth daily  Yes Historical Provider, MD   Multiple Vitamins-Minerals (THERAPEUTIC MULTIVITAMIN-MINERALS) tablet Take 1 tablet by mouth daily Yes Historical Provider, MD   omeprazole (PRILOSEC) 20 MG capsule Take 20 mg by mouth daily as needed  Yes Historical Provider, MD   senna (SENOKOT) 8.6 MG tablet Take 1 tablet by mouth daily as needed  Yes Historical Provider, MD   diphenhydrAMINE (BENADRYL) 25 MG tablet Take 25 mg by mouth nightly as needed.  Yes Historical Provider, MD       Social History     Tobacco Use    Smoking status: Former Smoker     Quit date: 2008     Years since quittin.5    Smokeless tobacco: Never Used   Vaping Use    Vaping Use: Never used   Substance Use Topics    Alcohol use: Yes     Comment: social use    Drug use: Never            PHYSICAL EXAMINATION:  [ INSTRUCTIONS:  \"[x]\" Indicates a positive item  \"[]\" Indicates a negative item  -- DELETE ALL ITEMS NOT EXAMINED]  Vital Signs: (As obtained by patient/caregiver or practitioner observation)    Blood pressure-  Heart rate-    Respiratory rate-12    Temperature-  Pulse oximetry-     Constitutional: [x] Appears well-developed and well-nourished [] No apparent distress      [] Abnormal-   Mental status  [] Alert and awake  [] Oriented to person/place/time []Able to follow commands      Eyes:  EOM    [x]  Normal  [] Abnormal-  Sclera  [x]  Normal  [] Abnormal -         Discharge [x]  None visible  [] Abnormal -    HENT:   [x] Normocephalic, atraumatic. [] Abnormal   [] Mouth/Throat: Mucous membranes are moist.     External Ears [] Normal  [] Abnormal-     Neck: [] No visualized mass     Pulmonary/Chest: [x] Respiratory effort normal.  [x] No visualized signs of difficulty breathing or respiratory distress        [] Abnormal-      Musculoskeletal:   [] Normal gait with no signs of ataxia         [x] Normal range of motion of neck        [] Abnormal-       Neurological:        [x] No Facial Asymmetry (Cranial nerve 7 motor function) (limited exam to video visit)          [x] No gaze palsy        [] Abnormal-         Skin:        [x] No significant exanthematous lesions or discoloration noted on facial skin         [] Abnormal-            Psychiatric:       [x] Normal Affect [x] No Hallucinations        [] Abnormal-     Other pertinent observable physical exam findings-     ASSESSMENT/PLAN:   Diagnosis Orders   1. Bronchitis  XR CHEST (2 VW)   ? Viral she has been on a [facility]   Outpatient Encounter Medications as of 1/18/2022   Medication Sig Dispense Refill    gabapentin (NEURONTIN) 300 MG capsule Take 1 capsule by mouth 3 times daily for 30 days.  90 capsule 0    naproxen (NAPROSYN) 500 MG tablet TAKE ONE TABLET BY MOUTH TWICE A DAY WITH MEALS 60 tablet 2    QUEtiapine (SEROQUEL) 25 MG tablet TAKE ONE TO TWO TABLETS BY MOUTH ONCE NIGHTLY 60 tablet 5    hydrOXYzine (ATARAX) 25 MG tablet Take 1 tablet by mouth 3 times daily as needed for Anxiety 90 tablet 2    melatonin 3 MG TABS tablet Take 3 mg by mouth nightly as needed      cetirizine (ZYRTEC) 10 MG tablet Take 10 mg by mouth as needed       Calcium Carbonate-Vitamin D (CALTRATE 600+D PO) Take 1 tablet by mouth daily       Multiple Vitamins-Minerals (THERAPEUTIC MULTIVITAMIN-MINERALS) tablet Take 1 tablet by mouth daily      omeprazole (PRILOSEC) 20 MG capsule Take 20 mg by mouth daily as needed       senna (SENOKOT) 8.6 MG tablet Take 1 tablet by mouth daily as needed       diphenhydrAMINE (BENADRYL) 25 MG tablet Take 25 mg by mouth nightly as needed. No facility-administered encounter medications on file as of 1/18/2022. Orders Placed This Encounter   Procedures    XR CHEST (2 VW)     Standing Status:   Future     Standing Expiration Date:   1/18/2023     Order Specific Question:   Reason for exam:     Answer:   chronic cough   will check a chest xray . To er if SOB increases  Cleo Suarez, was evaluated through a synchronous (real-time) audio-video encounter. The patient (or guardian if applicable) is aware that this is a billable service, which includes applicable co-pays. This Virtual Visit was conducted with patient's (and/or legal guardian's) consent. The visit was conducted pursuant to the emergency declaration under the [address] [place] [address], [address] [place] [address] authority and the [employer] and [employer] General Act. Patient identification was verified, and a caregiver was present when appropriate. The patient was located in a state where the provider was licensed to provide care. Total time spent on this encounter: Not billed by time    --Refferoz Trevizo, DO on 1/18/2022 at 11:04 AM    An electronic signature was used to authenticate this note.

## 2022-01-25 ENCOUNTER — TELEPHONE (OUTPATIENT)
Dept: FAMILY MEDICINE CLINIC | Age: 62
End: 2022-01-25

## 2022-01-31 ENCOUNTER — VIRTUAL VISIT (OUTPATIENT)
Dept: FAMILY MEDICINE CLINIC | Age: 62
End: 2022-01-31
Payer: COMMERCIAL

## 2022-01-31 ENCOUNTER — TELEPHONE (OUTPATIENT)
Dept: FAMILY MEDICINE CLINIC | Age: 62
End: 2022-01-31

## 2022-01-31 DIAGNOSIS — R50.9 FEVER, UNSPECIFIED FEVER CAUSE: ICD-10-CM

## 2022-01-31 DIAGNOSIS — J01.90 ACUTE BACTERIAL SINUSITIS: Primary | ICD-10-CM

## 2022-01-31 DIAGNOSIS — B96.89 ACUTE BACTERIAL SINUSITIS: Primary | ICD-10-CM

## 2022-01-31 PROCEDURE — 99213 OFFICE O/P EST LOW 20 MIN: CPT | Performed by: INTERNAL MEDICINE

## 2022-01-31 ASSESSMENT — ENCOUNTER SYMPTOMS
WHEEZING: 0
SINUS PAIN: 1
ABDOMINAL PAIN: 0
RHINORRHEA: 1
SHORTNESS OF BREATH: 0
SINUS PRESSURE: 1
COUGH: 1

## 2022-01-31 NOTE — TELEPHONE ENCOUNTER
----- Message from Evonne Guzman sent at 1/31/2022 10:30 AM EST -----  Subject: Message to Provider    QUESTIONS  Information for Provider? tested positive COVID 1/31/22. Please contact pt   regarding symptoms and treatment.  ---------------------------------------------------------------------------  --------------  CALL BACK INFO  What is the best way for the office to contact you? OK to leave message on   voicemail  Preferred Call Back Phone Number? [phone]  ---------------------------------------------------------------------------  --------------  SCRIPT ANSWERS  Relationship to Patient?  Self

## 2022-01-31 NOTE — PROGRESS NOTES
1/31/2022    TELEHEALTH EVALUATION -- Audio/Visual (During QEN-96 public health emergency)    HPI:  Chief Complaint   Patient presents with    Sinusitis     ph. [phone]. patient c/o fever last night (101 degrees), green nasal discharge, dizziness, sweats, productive cough. patient denies sore throat   patient was just on a [facility]  Danielle Scot is a 63 y.o. female with the following history as recorded in EpicCare:  Patient Active Problem List    Diagnosis Date Noted    Trigger index finger of right hand 05/07/2018    Primary osteoarthritis of first carpometacarpal joint of right hand 05/07/2018    Pain of right thumb 03/07/2018    Pink eye disease of right eye 03/07/2018    Incisional hernia, without obstruction or gangrene     Neck pain 05/23/2016    Abdominal wall pain 05/23/2016    Skull mass 02/13/2015    Skin lesion of face 12/12/2014    Skin lesion 12/12/2014    Trigeminal neuralgia 11/11/2014    Change in vision 10/14/2014    Pharyngitis 07/01/2014    Left foot pain 07/01/2014    Chills 07/01/2014    GERD (gastroesophageal reflux disease) 04/01/2014    Abdominal wall pain in epigastric region 04/01/2014    Nausea 04/01/2014    IBS (irritable bowel syndrome)     Anxiety disorder     Depression     Allergic rhinitis     HSV (herpes simplex virus) infection     [facility] ([facility]      Current Outpatient Medications   Medication Sig Dispense Refill    gabapentin (NEURONTIN) 300 MG capsule Take 1 capsule by mouth 3 times daily for 30 days.  90 capsule 0    naproxen (NAPROSYN) 500 MG tablet TAKE ONE TABLET BY MOUTH TWICE A DAY WITH MEALS 60 tablet 2    QUEtiapine (SEROQUEL) 25 MG tablet TAKE ONE TO TWO TABLETS BY MOUTH ONCE NIGHTLY 60 tablet 5    hydrOXYzine (ATARAX) 25 MG tablet Take 1 tablet by mouth 3 times daily as needed for Anxiety 90 tablet 2    melatonin 3 MG TABS tablet Take 3 mg by mouth nightly as needed      cetirizine (ZYRTEC) 10 MG tablet Take 10 mg by mouth as needed       Calcium Carbonate-Vitamin D (CALTRATE 600+D PO) Take 1 tablet by mouth daily       Multiple Vitamins-Minerals (THERAPEUTIC MULTIVITAMIN-MINERALS) tablet Take 1 tablet by mouth daily      omeprazole (PRILOSEC) 20 MG capsule Take 20 mg by mouth daily as needed       senna (SENOKOT) 8.6 MG tablet Take 1 tablet by mouth daily as needed       diphenhydrAMINE (BENADRYL) 25 MG tablet Take 25 mg by mouth nightly as needed. No current facility-administered medications for this visit.      Allergies: Dye [iodides], Shellfish-derived products, Shrimp (diagnostic), Bactrim [sulfamethoxazole-trimethoprim], Paxil [paroxetine], Pcn [penicillins], Amoxicillin-pot clavulanate, Cephalexin, Clindamycin/lincomycin cross reactors, Erythromycin, Nortriptyline, Sertraline, Sulfa antibiotics, and Tetracyclines & related  Past Medical History:   Diagnosis Date    Allergic rhinitis     Anxiety disorder     Depression     HSV (herpes simplex virus) infection     IBS (irritable bowel syndrome)     [facility]     Stewart-Parkinson-White syndrome      Past Surgical History:   Procedure Laterality Date    APPENDECTOMY  07/05/1999    CATARACT REMOVAL WITH IMPLANT Left     CHOLECYSTECTOMY      COLONOSCOPY  03/2020    Dr. Lo Blandon    COLONOSCOPY N/A 3/5/2020    COLORECTAL CANCER SCREENING, NOT HIGH RISK performed by Cristian Cedeño MD at [address] [place] [address] 4/23/2021    EXCISION OF LARGE GANGLION CYST RIGHT FOOT AND ANKLE performed by Padmini Short DPM at [address] [facility]  6-8-16    with mesh    OTHER SURGICAL HISTORY      bone spur removed from front of skull    PARTIAL HYSTERECTOMY  07/16/1999    TONSILLECTOMY       Family History   Problem Relation Age of Onset    Heart Disease Father     Diabetes Father     No Known Problems Mother     Kidney Disease Paternal Aunt      Social History     Tobacco Use    Smoking status: Former Smoker     Quit date: 2008     Years since quittin.6    Smokeless tobacco: Never Used   Substance Use Topics    Alcohol use: Yes     Comment: social use       Yudy Ortiz (:  1960) has requested an audio/video evaluation for the following concern(s):    Chief Complaint   Patient presents with    Sinusitis     ph. [phone]. patient c/o fever last night (101 degrees), green nasal discharge, dizziness, sweats, productive cough. patient denies sore throat       Review of Systems   Constitutional: Positive for fever. HENT: Positive for congestion, postnasal drip, rhinorrhea, sinus pressure and sinus pain. Respiratory: Positive for cough. Negative for shortness of breath and wheezing. Cardiovascular: Negative for chest pain and palpitations. Gastrointestinal: Negative for abdominal pain. Genitourinary: Negative for dysuria and frequency. Prior to Visit Medications    Medication Sig Taking? Authorizing Provider   gabapentin (NEURONTIN) 300 MG capsule Take 1 capsule by mouth 3 times daily for 30 days.  Yes Jayson Rosas DO   naproxen (NAPROSYN) 500 MG tablet TAKE ONE TABLET BY MOUTH TWICE A DAY WITH MEALS Yes Jayson Rosas DO   QUEtiapine (SEROQUEL) 25 MG tablet TAKE ONE TO TWO TABLETS BY MOUTH ONCE NIGHTLY Yes PATRICK Alva NP   hydrOXYzine (ATARAX) 25 MG tablet Take 1 tablet by mouth 3 times daily as needed for Anxiety Yes PATRICK Alva NP   melatonin 3 MG TABS tablet Take 3 mg by mouth nightly as needed Yes Historical Provider, MD   cetirizine (ZYRTEC) 10 MG tablet Take 10 mg by mouth as needed  Yes Historical Provider, MD   Calcium Carbonate-Vitamin D (CALTRATE 600+D PO) Take 1 tablet by mouth daily  Yes Historical Provider, MD   Multiple Vitamins-Minerals (THERAPEUTIC MULTIVITAMIN-MINERALS) tablet Take 1 tablet by mouth daily Yes Historical Provider, MD   omeprazole (PRILOSEC) 20 MG capsule Take 20 mg by mouth daily as needed  Yes Historical Provider, MD   senna (SENOKOT) 8.6 MG tablet Take 1 tablet by mouth daily as needed  Yes Historical Provider, MD   diphenhydrAMINE (BENADRYL) 25 MG tablet Take 25 mg by mouth nightly as needed. Yes Historical Provider, MD       Social History     Tobacco Use    Smoking status: Former Smoker     Quit date: 2008     Years since quittin.6    Smokeless tobacco: Never Used   Vaping Use    Vaping Use: Never used   Substance Use Topics    Alcohol use: Yes     Comment: social use    Drug use: Never            PHYSICAL EXAMINATION:  [ INSTRUCTIONS:  \"[x]\" Indicates a positive item  \"[]\" Indicates a negative item  -- DELETE ALL ITEMS NOT EXAMINED]  Vital Signs: (As obtained by patient/caregiver or practitioner observation)    Blood pressure-  Heart rate-    Respiratory rate- 12   Temperature-  Pulse oximetry-     Constitutional: [x] Appears well-developed and well-nourished [x] No apparent distress      [] Abnormal-   Mental status  [] Alert and awake  [] Oriented to person/place/time []Able to follow commands      Eyes:  EOM    [x]  Normal  [] Abnormal-  Sclera  [x]  Normal  [] Abnormal -         Discharge []  None visible  [] Abnormal -    HENT:   [x] Normocephalic, atraumatic.   [] Abnormal   [] Mouth/Throat: Mucous membranes are moist.     External Ears [x] Normal  [] Abnormal-     Neck: [x] No visualized mass     Pulmonary/Chest: [x] Respiratory effort normal.  [x] No visualized signs of difficulty breathing or respiratory distress        [] Abnormal-      Musculoskeletal:   [] Normal gait with no signs of ataxia         [x] Normal range of motion of neck        [] Abnormal-       Neurological:        [x] No Facial Asymmetry (Cranial nerve 7 motor function) (limited exam to video visit)          [x] No gaze palsy        [] Abnormal-         Skin:        [x] No significant exanthematous lesions or discoloration noted on facial skin         [] Abnormal- Psychiatric:       [x] Normal Affect [x] No Hallucinations        [] Abnormal-     Other pertinent observable physical exam findings-     ASSESSMENT/PLAN:   Diagnosis Orders   1. Acute bacterial sinusitis     2. Fever, unspecified fever cause     needs covid test  She will call me with  results    Vito Mendoza, was evaluated through a synchronous (real-time) audio-video encounter. The patient (or guardian if applicable) is aware that this is a billable service, which includes applicable co-pays. This Virtual Visit was conducted with patient's (and/or legal guardian's) consent. The visit was conducted pursuant to the emergency declaration under the [address] [place] [address], [address] [place] [address] authority and the [employer] General Act. Patient identification was verified, and a caregiver was present when appropriate. The patient was located at home in a state where the provider was licensed to provide care. Total time spent on this encounter: Not billed by time    --Andrés White DO on 1/31/2022 at 9:03 AM    An electronic signature was used to authenticate this note.

## 2022-01-31 NOTE — PATIENT INSTRUCTIONS
Thank you for choosing [facility]. Please bring a current list of medications to every appointment. Please contact your pharmacy for any prescription refill(s) that you are requesting.

## 2022-02-07 RX ORDER — GABAPENTIN 300 MG/1
CAPSULE ORAL
Qty: 90 CAPSULE | Refills: 0 | Status: SHIPPED | OUTPATIENT
Start: 2022-02-07 | End: 2022-03-09 | Stop reason: SDUPTHER

## 2022-02-10 DIAGNOSIS — G47.9 SLEEP DIFFICULTIES: ICD-10-CM

## 2022-02-10 DIAGNOSIS — F41.1 GAD (GENERALIZED ANXIETY DISORDER): ICD-10-CM

## 2022-02-10 DIAGNOSIS — F33.2 SEVERE EPISODE OF RECURRENT MAJOR DEPRESSIVE DISORDER, WITHOUT PSYCHOTIC FEATURES (HCC): ICD-10-CM

## 2022-02-10 RX ORDER — QUETIAPINE FUMARATE 25 MG/1
TABLET, FILM COATED ORAL
Qty: 60 TABLET | Refills: 5 | Status: SHIPPED | OUTPATIENT
Start: 2022-02-10 | End: 2022-07-08 | Stop reason: SDUPTHER

## 2022-02-16 ENCOUNTER — OFFICE VISIT (OUTPATIENT)
Dept: PSYCHIATRY | Age: 62
End: 2022-02-16
Payer: COMMERCIAL

## 2022-02-16 DIAGNOSIS — F90.0 ADHD (ATTENTION DEFICIT HYPERACTIVITY DISORDER), INATTENTIVE TYPE: ICD-10-CM

## 2022-02-16 DIAGNOSIS — F90.0 ADHD (ATTENTION DEFICIT HYPERACTIVITY DISORDER), INATTENTIVE TYPE: Primary | ICD-10-CM

## 2022-02-16 DIAGNOSIS — F41.1 GAD (GENERALIZED ANXIETY DISORDER): ICD-10-CM

## 2022-02-16 DIAGNOSIS — F33.1 MDD (MAJOR DEPRESSIVE DISORDER), RECURRENT EPISODE, MODERATE (HCC): ICD-10-CM

## 2022-02-16 LAB
A/G RATIO: 1.8 (ref 1.1–2.2)
ALBUMIN SERPL-MCNC: 4.8 G/DL (ref 3.4–5)
ALP BLD-CCNC: 80 U/L (ref 40–129)
ALT SERPL-CCNC: 19 U/L (ref 10–40)
ANION GAP SERPL CALCULATED.3IONS-SCNC: 15 MMOL/L (ref 3–16)
AST SERPL-CCNC: 27 U/L (ref 15–37)
BILIRUB SERPL-MCNC: 0.4 MG/DL (ref 0–1)
BUN BLDV-MCNC: 12 MG/DL (ref 7–20)
CALCIUM SERPL-MCNC: 10.1 MG/DL (ref 8.3–10.6)
CHLORIDE BLD-SCNC: 101 MMOL/L (ref 99–110)
CHOLESTEROL, TOTAL: 238 MG/DL (ref 0–199)
CO2: 24 MMOL/L (ref 21–32)
CREAT SERPL-MCNC: 0.5 MG/DL (ref 0.6–1.1)
GFR AFRICAN AMERICAN: >60
GFR NON-AFRICAN AMERICAN: >60
GLUCOSE BLD-MCNC: 101 MG/DL (ref 70–99)
HCT VFR BLD CALC: 40.1 % (ref 36–48)
HDLC SERPL-MCNC: 68 MG/DL (ref 40–60)
HEMOGLOBIN: 13.4 G/DL (ref 12–16)
LDL CHOLESTEROL CALCULATED: 152 MG/DL
MCH RBC QN AUTO: 29.2 PG (ref 26–34)
MCHC RBC AUTO-ENTMCNC: 33.4 G/DL (ref 31–36)
MCV RBC AUTO: 87.6 FL (ref 80–100)
PDW BLD-RTO: 14.1 % (ref 12.4–15.4)
PLATELET # BLD: 306 K/UL (ref 135–450)
PMV BLD AUTO: 8.2 FL (ref 5–10.5)
POTASSIUM SERPL-SCNC: 4.5 MMOL/L (ref 3.5–5.1)
RBC # BLD: 4.58 M/UL (ref 4–5.2)
SODIUM BLD-SCNC: 140 MMOL/L (ref 136–145)
TOTAL PROTEIN: 7.5 G/DL (ref 6.4–8.2)
TRIGL SERPL-MCNC: 88 MG/DL (ref 0–150)
TSH REFLEX FT4: 1.21 UIU/ML (ref 0.27–4.2)
VITAMIN D 25-HYDROXY: 39.7 NG/ML
VLDLC SERPL CALC-MCNC: 18 MG/DL
WBC # BLD: 5.1 K/UL (ref 4–11)

## 2022-02-16 PROCEDURE — 99214 OFFICE O/P EST MOD 30 MIN: CPT | Performed by: NURSE PRACTITIONER

## 2022-02-16 RX ORDER — DEXTROAMPHETAMINE SACCHARATE, AMPHETAMINE ASPARTATE MONOHYDRATE, DEXTROAMPHETAMINE SULFATE AND AMPHETAMINE SULFATE 2.5; 2.5; 2.5; 2.5 MG/1; MG/1; MG/1; MG/1
10 CAPSULE, EXTENDED RELEASE ORAL EVERY MORNING
Qty: 30 CAPSULE | Refills: 0 | Status: SHIPPED
Start: 2022-02-16 | End: 2022-03-11 | Stop reason: DRUGHIGH

## 2022-02-16 NOTE — PROGRESS NOTES
greater. No data recorded  Interpretation of PHQ-9 score:  1-4 = minimal depression, 5-9 = mild depression,   10-14 = moderate depression; 15-19 = moderately severe depression, 20-27 = severe depression    Past Medical/Surgical History:   Past Medical History:   Diagnosis Date    Allergic rhinitis     Anxiety disorder     Depression     HSV (herpes simplex virus) infection     IBS (irritable bowel syndrome)     SVT (supraventricular tachycardia) ([address]     Stewart-Parkinson-White syndrome      Past Surgical History:   Procedure Laterality Date    APPENDECTOMY  07/05/1999    CATARACT REMOVAL WITH IMPLANT Left     CHOLECYSTECTOMY      COLONOSCOPY  03/2020    Dr. Howell Mins    COLONOSCOPY N/A 3/5/2020    COLORECTAL CANCER SCREENING, NOT HIGH RISK performed by Samantha Sheppard MD at [facility] 4/23/2021    EXCISION OF LARGE GANGLION CYST RIGHT FOOT AND ANKLE performed by Velma Arndt DPM at [address] [facility]  6-8-16    with mesh    OTHER SURGICAL HISTORY      bone spur removed from front of skull    PARTIAL HYSTERECTOMY  07/16/1999    TONSILLECTOMY         Family History   Problem Relation Age of Onset    Heart Disease Father     Diabetes Father     No Known Problems Mother     Kidney Disease Paternal Aunt          PCP: Matt TELLEZ DO      Allergies:    Allergies   Allergen Reactions    Dye [Iodides] Anaphylaxis and Shortness Of Breath     With shellfish in hives    Shellfish-Derived Products Anaphylaxis    Shrimp (Diagnostic) Anaphylaxis    Bactrim [Sulfamethoxazole-Trimethoprim]      Blisters,skin burns    Paxil [Paroxetine] Other (See Comments)     hallucuinations    Pcn [Penicillins]      Has never taken but stays away from due to cephalexin and keflex    Amoxicillin-Pot Clavulanate Rash    Cephalexin Rash    Clindamycin/Lincomycin Cross Reactors Rash    Erythromycin Rash    Nortriptyline Rash    Sertraline Rash     Skin peels    Sulfa Antibiotics Rash     Bactrim-burns skin    Tetracyclines & Related Rash         Current Medications:   Current Outpatient Medications on File Prior to Visit   Medication Sig Dispense Refill    QUEtiapine (SEROQUEL) 25 MG tablet TAKE ONE TO TWO TABLETS BY MOUTH ONCE NIGHTLY 60 tablet 5    gabapentin (NEURONTIN) 300 MG capsule TAKE ONE CAPSULE BY MOUTH THREE TIMES A DAY 90 capsule 0    naproxen (NAPROSYN) 500 MG tablet TAKE ONE TABLET BY MOUTH TWICE A DAY WITH MEALS 60 tablet 2    hydrOXYzine (ATARAX) 25 MG tablet Take 1 tablet by mouth 3 times daily as needed for Anxiety 90 tablet 2    melatonin 3 MG TABS tablet Take 3 mg by mouth nightly as needed      cetirizine (ZYRTEC) 10 MG tablet Take 10 mg by mouth as needed       Calcium Carbonate-Vitamin D (CALTRATE 600+D PO) Take 1 tablet by mouth daily       Multiple Vitamins-Minerals (THERAPEUTIC MULTIVITAMIN-MINERALS) tablet Take 1 tablet by mouth daily      omeprazole (PRILOSEC) 20 MG capsule Take 20 mg by mouth daily as needed       senna (SENOKOT) 8.6 MG tablet Take 1 tablet by mouth daily as needed       diphenhydrAMINE (BENADRYL) 25 MG tablet Take 25 mg by mouth nightly as needed. No current facility-administered medications on file prior to visit. Controlled Substance Monitoring:  PDMP Monitoring:    Last PDMP John as Reviewed [facility]):  Review User Review Instant Review Result   JUAQUIN CARNEY 2/16/2022  9:20 AM Reviewed PDMP [1]       Urine Drug Screenings (1 yr)    No resulted procedures found. Medication Contract and Consent for Opioid Use Documents Filed      No documents found                  OBJECTIVE:  Vitals:    Wt Readings from Last 3 Encounters:   10/18/21 187 lb (84.8 kg)   10/05/21 186 lb 3.2 oz (84.5 kg)   09/03/21 180 lb (81.6 kg)       ROS: Denies trouble with fever, rash, headache, vision changes, chest pain, shortness of breath, nausea, extremity pain, weakness, dysuria. + Left foot pain     Mental Status Exam:      Appearance    alert, cooperative  Muscle strength/tone: no atrophy or abnormal movements  Gait/station: normal  Impulsive behavior Yes  Speech    spontaneous, normal rate and normal volume  Mood    Anxious  Affect    Anxious Congruent to thought content and mood  Thought Content    hopelessness, helplessness and excessive guilt, no delusions voiced  Thought Process    linear   Associations    logical connections  Perceptions: denies AH/VH, does not appear preoccupied with the internal environment, no delusions  Insight    Good  Judgment    Intact  Orientation    oriented to person, place, time, and general circumstances  Memory    recent and remote memory intact  Attention/Concentration    intact  Ability to understand instructions Yes  Ability to respond meaningfully Yes  Language:  [address] Knowledge/Intelligence:  Average  SI:   no suicidal ideation  HI: Denies HI      Labs:     Orders Only on 10/05/2021   Component Date Value    WBC 10/05/2021 6.2     RBC 10/05/2021 4.59     Hemoglobin 10/05/2021 13.2     Hematocrit 10/05/2021 40.7     MCV 10/05/2021 88.7     MCH 10/05/2021 28.8     MCHC 10/05/2021 32.5     RDW 10/05/2021 13.9     Platelets 37/88/7530 264     MPV 10/05/2021 8.9     Neutrophils % 10/05/2021 51.5     Lymphocytes % 10/05/2021 39.0     Monocytes % 10/05/2021 6.9     Eosinophils % 10/05/2021 1.9     Basophils % 10/05/2021 0.7     Neutrophils Absolute 10/05/2021 3.2     Lymphocytes Absolute 10/05/2021 2.4     Monocytes Absolute 10/05/2021 0.4     Eosinophils Absolute 10/05/2021 0.1     Basophils Absolute 10/05/2021 0.0     TSH 10/05/2021 1.23        Last Drug screen:  No results found for: Mauro Bill, LABBENZ, COCAIMETSCRU, THC, MDMA, LABMETH, OPIATESCREENURINE, OXTCOSU, PHENCYCLIDINESCREENURINE, PROPOXYPHENE, METAMPU      Imaging: no head imaging on file      ASSESSMENT AND PLAN     Diagnosis Orders   1.  ADHD (attention deficit hyperactivity disorder), inattentive type  amphetamine-dextroamphetamine (ADDERALL XR) 10 MG extended release capsule    DRUG SCREEN MULTI URINE    LIPID PANEL    Hemoglobin A1C    TSH WITH REFLEX TO FT4    CBC    Comprehensive Metabolic Panel    Vitamin D 25 Hydroxy   2. LUIZ (generalized anxiety disorder)     3. MDD (major depressive disorder), recurrent episode, moderate ([address]         1. Safety: NO Imminent risk of danger to/self/others based on the factors considered below. Appropriate for outpatient level of care.  Safety plan includes: 911, PES, hotlines, and interventions discussed today.      Risk factors: Age <25 or >55,  depressed mood, suicidal ideation, suicidal plan, access to lethal means, prior suicide attempt, family h/o completed suicide, substance abuse, chronic pain or medical illness, social isolation, history of violence, active psychosis, cognitive impairment, no outpatient services in place, medication noncompliance, and no collateral information to support safety.     Protective factors:female gender, denies suicidal ideation, does not have lethal plan,no prior suicide attempts, no family h/o suicide, no substance abuse, patient has social or family support, no active psychosis or cognitive dysfunction, physically healthy,compliant with recommended medications, and patient is future oriented.        2. Psychiatric Plan     MDD, severe, without psychotic features/LUIZ: Patient struggling with severe depressive sx related to issues she has had to deal with over the years. Issues with son have heightened her depression and anxiety. April and May worse months as she is triggered.     -Continue Hydroxyzine 25 mg TID for anxiety and sleep. Side effect discussed. Beneficial to patient.      - Continue Seroquel 25 mg 1-2 tablets nightly for sleep/mood issues/MDD augmentation.  Will monitor metabolic side effects with this.     ADHD, inattentive:    -Trial low dose Adderall 10 mg XR once daily for ADHD symptoms. Positive family history including son, grandson. Endorses symptoms throughout her lifetime in school prior to elementary and has continued to struggle with them currently. Side effects discussed. No chance of pregnancy. No hx of cardiac events. Med contract signed today. UDS ordered.    -Labs: reviewed in Epic  Ordered UDS for controlled substance. Ordered CBC, CMP, vitamin d, tsh, lipid panel with Seroquel      -Recommend outpt therapy.  Declines at this time.     -OARRS reviewed, c/w history  -R/b/se/a d/w pt who consents.     3. Medical  -Following with CAROLE TELLEZ DO     4. Substance   -No active issues.     5. RTC - 4 weeks    Vini Andrew, [address], [place] Nurse Practitioner    On this date 2/16/2022 I have spent 30 minutes reviewing previous notes, test results and face to face with the patient discussing the diagnosis and importance of compliance with the treatment plan as well as documenting on the day of the visit.

## 2022-02-17 LAB
ESTIMATED AVERAGE GLUCOSE: 119.8 MG/DL
HBA1C MFR BLD: 5.8 %

## 2022-02-22 DIAGNOSIS — F90.0 ADHD (ATTENTION DEFICIT HYPERACTIVITY DISORDER), INATTENTIVE TYPE: ICD-10-CM

## 2022-02-22 LAB
AMPHETAMINE SCREEN, URINE: NORMAL
BARBITURATE SCREEN URINE: NORMAL
BENZODIAZEPINE SCREEN, URINE: NORMAL
CANNABINOID SCREEN URINE: NORMAL
COCAINE METABOLITE SCREEN URINE: NORMAL
Lab: NORMAL
METHADONE SCREEN, URINE: NORMAL
OPIATE SCREEN URINE: NORMAL
OXYCODONE URINE: NORMAL
PH UA: 7
PHENCYCLIDINE SCREEN URINE: NORMAL
PROPOXYPHENE SCREEN: NORMAL

## 2022-03-09 RX ORDER — GABAPENTIN 300 MG/1
CAPSULE ORAL
Qty: 90 CAPSULE | Refills: 0 | Status: SHIPPED | OUTPATIENT
Start: 2022-03-09 | End: 2022-04-08

## 2022-03-11 ENCOUNTER — TELEMEDICINE (OUTPATIENT)
Dept: PSYCHIATRY | Age: 62
End: 2022-03-11
Payer: COMMERCIAL

## 2022-03-11 DIAGNOSIS — F90.0 ADHD (ATTENTION DEFICIT HYPERACTIVITY DISORDER), INATTENTIVE TYPE: Primary | ICD-10-CM

## 2022-03-11 DIAGNOSIS — F41.1 GAD (GENERALIZED ANXIETY DISORDER): ICD-10-CM

## 2022-03-11 DIAGNOSIS — F33.1 MDD (MAJOR DEPRESSIVE DISORDER), RECURRENT EPISODE, MODERATE (HCC): ICD-10-CM

## 2022-03-11 PROCEDURE — 98967 PH1 ASSMT&MGMT NQHP 11-20: CPT | Performed by: NURSE PRACTITIONER

## 2022-03-11 RX ORDER — DEXTROAMPHETAMINE SACCHARATE, AMPHETAMINE ASPARTATE MONOHYDRATE, DEXTROAMPHETAMINE SULFATE AND AMPHETAMINE SULFATE 3.75; 3.75; 3.75; 3.75 MG/1; MG/1; MG/1; MG/1
15 CAPSULE, EXTENDED RELEASE ORAL DAILY
Qty: 30 CAPSULE | Refills: 0 | Status: SHIPPED | OUTPATIENT
Start: 2022-03-11 | End: 2022-03-11

## 2022-03-11 RX ORDER — DEXTROAMPHETAMINE SACCHARATE, AMPHETAMINE ASPARTATE MONOHYDRATE, DEXTROAMPHETAMINE SULFATE AND AMPHETAMINE SULFATE 3.75; 3.75; 3.75; 3.75 MG/1; MG/1; MG/1; MG/1
15 CAPSULE, EXTENDED RELEASE ORAL DAILY
Qty: 30 CAPSULE | Refills: 0 | Status: SHIPPED | OUTPATIENT
Start: 2022-03-15 | End: 2022-05-02 | Stop reason: SDUPTHER

## 2022-03-11 NOTE — PROGRESS NOTES
[facility] Patient Status:  Hospital Outpatient Surgery    5/10/1951 MRN [id number]   Location [id number] [place] [address] Attending Jacquelyn Skaggs MD   Date 2021 PCP Pancho Redmond DO     PREOPERATIVE DIAGNOSIS/INDICATION: Caryle Binder PSYCHIATRY FOLLOW UP EVALUATION      Yulissa Gonzalez  1960 03/11/22    CC:   Chief Complaint   Patient presents with    ADHD    Follow-up       HPI:   Referred by Evens Portillo DO. Yulissa Gonzalez is a 63 y.o. female with a history of ADHD, anxiety, depression being evaluated by a Virtual Visit (video visit) encounter to address concerns as mentioned above. A caregiver was present when appropriate. Patient was evaluated through a synchronous (real-time) audio-video encounter. The patient (or guardian if applicable) is aware that this a billable service, which includes applicable co-pays. The virtual visit was conducted with patient or legal guardians consent to reduce the patient's risk of exposure to COVID-19. The visit was conducted pursuant to the emergency declaration under the [address] [place] [address], [address] [place] [address] authority and the [employer] and [employer] General Act. Patient identified was verified, and a caregiver was present when appropriate. The patient was located in a state where the provider was licensed to provider care. The patient (and/or legal guardian) has also been advised to contact this office for worsening conditions or problems, and seek emergency medical treatment and/or call 911 if deemed necessary. Subjective/Interval Hx: A lot of improvement with Adderall. Dosage not quite where it needs to be. Able to complete tasks, job requirements. Has been helping her anxiety, irritability as well. Helping her with thought control. Location:  Mind  Severity: Mild  Context:  As above. Modifiers: Improved with meds  Quality: Anxious sometimes. ADHD improved    ASRS Scores:  ADHD screener results were positive.   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 .  - Call s             NFXHN hospitalizations: Denies              OYFOS diagnoses: Depression              Outpatient Treatment: See below                          Psychiatrist: Denies                          HGUUXEVFI: 5 years ago.               Suicide Attempts: Denies              Hx SH:  Denies     Past Psychopharmacologic Trials (including response/reactions):  Buspar- light headedness, dizzy  Zoloft- rash  Paxil- hallucinations  Nortriptyline  Xanax- rash  Valium-rash  Wellbutrin    LUIZ 7 SCORE 4/20/2021 3/3/2021   LUIZ-7 Total Score 13 13     Interpretation of LUIZ-7 score: 5-9 = mild anxiety, 10-14 = moderate anxiety,   15+ = severe anxiety. Recommend referral to behavioral health for scores 10 or greater.     No data recorded  Interpretation of PHQ-9 score:  1-4 = minimal depression, 5-9 = mild depression,   10-14 = moderate depression; 15-19 = moderately severe depression, 20-27 = severe depression    Past Medical/Surgical History:   Past Medical History:   Diagnosis Date    Allergic rhinitis     Anxiety disorder     Depression     HSV (herpes simplex virus) infection     IBS (irritable bowel syndrome)     [facility]ra[facility] tachy[facility]) [facility]     Stewart-Parkinson-White syndrome      Past Surgical History:   Procedure Laterality Date    APPENDECTOMY  07/05/1999    CATARACT REMOVAL WITH IMPLANT Left     CHOLECYSTECTOMY      COLONOSCOPY  03/2020    Dr. January Ceron    COLONOSCOPY N/A 3/5/2020    COLORECTAL CANCER SCREENING, NOT HIGH RISK performed by Diego Thompson MD at [facility] 4/23/2021    EXCISION OF LARGE GANGLION CYST RIGHT FOOT AND ANKLE performed by Tanja Gibson DPM at [address] [facility]  6-8-16    with mesh    OTHER SURGICAL HISTORY      bone spur removed from front of skull    PARTIAL HYSTERECTOMY  07/16/1999    TONSILLECTOMY         Family History   Problem Relation Age of Onset    Heart Disease Father    [place] Diabetes Father     No Known Problems Mother     Kidney Disease Paternal Aunt          PCP: Rhina TELLEZ,       Allergies: Allergies   Allergen Reactions    Dye [Iodides] Anaphylaxis and Shortness Of Breath     With shellfish in hives    Shellfish-Derived Products Anaphylaxis    Shrimp (Diagnostic) Anaphylaxis    Bactrim [Sulfamethoxazole-Trimethoprim]      Blisters,skin burns    Paxil [Paroxetine] Other (See Comments)     hallucuinations    Pcn [Penicillins]      Has never taken but stays away from due to cephalexin and keflex    Amoxicillin-Pot Clavulanate Rash    Cephalexin Rash    Clindamycin/Lincomycin Cross Reactors Rash    Erythromycin Rash    Nortriptyline Rash    Sertraline Rash     Skin peels    Sulfa Antibiotics Rash     Bactrim-burns skin    Tetracyclines & Related Rash         Current Medications:   Current Outpatient Medications on File Prior to Visit   Medication Sig Dispense Refill    gabapentin (NEURONTIN) 300 MG capsule TAKE ONE CAPSULE BY MOUTH THREE TIMES A DAY 90 capsule 0    QUEtiapine (SEROQUEL) 25 MG tablet TAKE ONE TO TWO TABLETS BY MOUTH ONCE NIGHTLY 60 tablet 5    naproxen (NAPROSYN) 500 MG tablet TAKE ONE TABLET BY MOUTH TWICE A DAY WITH MEALS 60 tablet 2    hydrOXYzine (ATARAX) 25 MG tablet Take 1 tablet by mouth 3 times daily as needed for Anxiety 90 tablet 2    melatonin 3 MG TABS tablet Take 3 mg by mouth nightly as needed      cetirizine (ZYRTEC) 10 MG tablet Take 10 mg by mouth as needed       Calcium Carbonate-Vitamin D (CALTRATE 600+D PO) Take 1 tablet by mouth daily       Multiple Vitamins-Minerals (THERAPEUTIC MULTIVITAMIN-MINERALS) tablet Take 1 tablet by mouth daily      omeprazole (PRILOSEC) 20 MG capsule Take 20 mg by mouth daily as needed       senna (SENOKOT) 8.6 MG tablet Take 1 tablet by mouth daily as needed       diphenhydrAMINE (BENADRYL) 25 MG tablet Take 25 mg by mouth nightly as needed.        No current facility-administered medications on file prior to visit. Controlled Substance Monitoring:  PDMP Monitoring:    Last PDMP [facility] as Reviewed [facility]):  Review User Review Instant Review Result   JUAQUIN CARNEY 3/11/2022 11:43 AM Reviewed PDMP [1]       Urine Drug Screenings (1 yr)     DRUG SCREEN MULTI URINE  Collected: 2/22/2022  9:52 AM (Final result)   Narrative: Performed at:  [address]   Phone [phone]     Complete Results              Medication Contract and Consent for Opioid Use Documents Filed     Patient Documents     Type of Document Status Date Received Received By Description    Medication Contract Received 2/18/2022 11:31 AM TAMIKA TORRES Medication Contract                  OBJECTIVE:  Vitals:    Wt Readings from Last 3 Encounters:   10/18/21 187 lb (84.8 kg)   10/05/21 186 lb 3.2 oz (84.5 kg)   09/03/21 180 lb (81.6 kg)       ROS: Denies trouble with fever, rash, headache, vision changes, chest pain, shortness of breath, nausea, extremity pain, weakness, dysuria. + Left foot pain     Mental Status Exam:      Appearance    alert, cooperative, VV  Muscle strength/tone: DEAN vv  Gait/station: DEAN vv  Impulsive behavior Yes  Speech    spontaneous, normal rate and normal volume  Mood    Anxious  Affect    Anxious Congruent to thought content and mood  Thought Content    hopelessness, helplessness and excessive guilt, no delusions voiced  Thought Process    linear   Associations    logical connections  Perceptions: denies AH/VH, does not appear preoccupied with the internal environment, no delusions  Insight    Good  Judgment    Intact  Orientation    oriented to person, place, time, and general circumstances  Memory    recent and remote memory intact  Attention/Concentration    intact  Ability to understand instructions Yes  Ability to respond meaningfully Yes  Language:  Fluent   Fund of Knowledge/Intelligence:  Average  SI:   no suicidal ideation  HI: Denies HI     Labs:     Orders Only on 02/22/2022   Component Date Value    Amphetamine Screen, Urine 02/22/2022 Neg     Barbiturate Screen, Ur 02/22/2022 Neg     Benzodiazepine Screen, U* 02/22/2022 Neg     Cannabinoid Scrn, Ur 02/22/2022 Neg     Cocaine Metabolite Scree* 02/22/2022 Neg     Opiate Scrn, Ur 02/22/2022 Neg     PCP Screen, Urine 02/22/2022 Neg     Methadone Screen, Urine 02/22/2022 Neg     Propoxyphene Scrn, Ur 02/22/2022 Neg     Oxycodone Urine 02/22/2022 Neg     pH, UA 02/22/2022 7.0     Drug Screen Comment: 02/22/2022 see below    Orders Only on 02/16/2022   Component Date Value    Vit D, 25-Hydroxy 02/16/2022 39.7     Sodium 02/16/2022 140     Potassium 02/16/2022 4.5     Chloride 02/16/2022 101     CO2 02/16/2022 24     Anion Gap 02/16/2022 15     Glucose 02/16/2022 101*    BUN 02/16/2022 12     CREATININE 02/16/2022 0.5*    GFR Non- 02/16/2022 >60     GFR  02/16/2022 >60     Calcium 02/16/2022 10.1     Total Protein 02/16/2022 7.5     Albumin 02/16/2022 4.8     Albumin/Globulin Ratio 02/16/2022 1.8     Total Bilirubin 02/16/2022 0.4     Alkaline Phosphatase 02/16/2022 80     ALT 02/16/2022 19     AST 02/16/2022 27     WBC 02/16/2022 5.1     RBC 02/16/2022 4.58     Hemoglobin 02/16/2022 13.4     Hematocrit 02/16/2022 40.1     MCV 02/16/2022 87.6     MCH 02/16/2022 29.2     MCHC 02/16/2022 33.4     RDW 02/16/2022 14.1     Platelets 62/77/0412 306     MPV 02/16/2022 8.2     TSH Reflex FT4 02/16/2022 1.21     Hemoglobin A1C 02/16/2022 5.8     eAG 02/16/2022 119.8     Cholesterol, Total 02/16/2022 238*    Triglycerides 02/16/2022 88     HDL 02/16/2022 68*    LDL Calculated 02/16/2022 152*    VLDL Cholesterol Calcula* 02/16/2022 18    Orders Only on 10/05/2021   Component Date Value    WBC 10/05/2021 6.2     RBC 10/05/2021 4.59     Hemoglobin 10/05/2021 13.2     Hematocrit 10/05/2021 40.7     MCV 10/05/2021 88.7     MCH 10/05/2021 28.8     MCHC 10/05/2021 32.5     RDW 10/05/2021 13.9     Platelets 98/40/5715 264     MPV 10/05/2021 8.9     Neutrophils % 10/05/2021 51.5     Lymphocytes % 10/05/2021 39.0     Monocytes % 10/05/2021 6.9     Eosinophils % 10/05/2021 1.9     Basophils % 10/05/2021 0.7     Neutrophils Absolute 10/05/2021 3.2     Lymphocytes Absolute 10/05/2021 2.4     Monocytes Absolute 10/05/2021 0.4     Eosinophils Absolute 10/05/2021 0.1     Basophils Absolute 10/05/2021 0.0     TSH 10/05/2021 1.23        Last Drug screen:  Lab Results   Component Value Date    LABAMPH Neg 02/22/2022    LABBENZ Neg 02/22/2022    COCAIMETSCRU Neg 02/22/2022    LABMETH Neg 02/22/2022    OPIATESCREENURINE Neg 02/22/2022    PHENCYCLIDINESCREENURINE Neg 02/22/2022         Imaging: no head imaging on file      ASSESSMENT AND PLAN     Diagnosis Orders   1. ADHD (attention deficit hyperactivity disorder), inattentive type  amphetamine-dextroamphetamine (ADDERALL XR) 15 MG extended release capsule   2. LUIZ (generalized anxiety disorder)     3. MDD (major depressive disorder), recurrent episode, moderate ([facility]         1. Safety: NO Imminent risk of danger to/self/others based on the factors considered below.  Appropriate for outpatient level of care.  Safety plan includes: 911, PES, hotlines, and interventions discussed today.      Risk factors: Age <25 or >55,  depressed mood, suicidal ideation, suicidal plan, access to lethal means, prior suicide attempt, family h/o completed suicide, substance abuse, chronic pain or medical illness, social isolation, history of violence, active psychosis, cognitive impairment, no outpatient services in place, medication noncompliance, and no collateral information to support safety.     Protective factors:female gender, denies suicidal ideation, does not have lethal plan,no prior suicide attempts, no family h/o suicide, no substance abuse, patient has social or family support, no active psychosis or cognitive dysfunction, physically healthy,compliant with recommended medications, and patient is future oriented.        2. Psychiatric Plan     MDD, severe, without psychotic features/LUIZ: Patient struggling with severe depressive sx related to issues she has had to deal with over the years. Issues with son have heightened her depression and anxiety. April and May worse months as she is triggered.     -Continue Hydroxyzine 25 mg TID for anxiety and sleep. Side effect discussed. Beneficial to patient.      - Continue Seroquel 25 mg 1-2 tablets nightly for sleep/mood issues/MDD augmentation. Will monitor metabolic side effects with this.     ADHD, inattentive:     -Increase Adderall 15 mg XR once daily for ADHD symptoms. Positive family history including son, grandson. Endorses symptoms throughout her lifetime in school prior to elementary and has continued to struggle with them currently. Side effects discussed. No chance of pregnancy. No hx of cardiac events. Med contract signed today. UDS complete and clean.     -Labs: reviewed in Epic  Ordered UDS for controlled substance. Clean. Ordered CBC, CMP, vitamin d, tsh, lipid panel with Seroquel. Lipids elevated since last one done in 2017. Will check again in 6 months, may need to d/c Seroquel.      -Recommend outpt therapy.  Declines at this time.     -OARRS reviewed, c/w history  -R/b/se/a d/w pt who consents.     3. Medical  -Following with CAROLE TELLEZ DO     4. Substance   -No active issues.     5. RTC - 4 weeks- scheduled for 4/29. First available with transitioning to inpatient work.  Going to check in via Ryan english  [facility], [address] [place] [address], [place] Nurse Practitioner     On this date 3/11/2022 I have spent 15 minutes reviewing previous notes, test results and face to face (via telephone) with the patient discussing the diagnosis and importance of compliance with the treatment plan as well as documenting on the day of the visit.

## 2022-03-11 NOTE — PROGRESS NOTES
PSYCHIATRY FOLLOW UP Juan Srivastava  1960 03/11/22    CC: No chief complaint on file. HPI:   Referred by Мария Hernandez DO. Barbara Lee is a 63 y.o. female with a history of *** being evaluated by a Virtual Visit (video visit) encounter to address concerns as mentioned above. A caregiver was present when appropriate. Patient was evaluated through a synchronous (real-time) audio-video encounter. The patient (or guardian if applicable) is aware that this a billable service, which includes applicable co-pays. The virtual visit was conducted with patient or legal guardians consent to reduce the patient's risk of exposure to COVID-19. The visit was conducted pursuant to the emergency declaration under the [address] [place] [address] authority and the [employer] Act. Patient identified was verified, and a caregiver was present when appropriate. The patient was located in a state where the provider was licensed to provider care. The patient (and/or legal guardian) has also been advised to contact this office for worsening conditions or problems, and seek emergency medical treatment and/or call 911 if deemed necessary. Subjective/Interval Hx:    Location:  Mind  Severity:  Context:  As above. Modifiers:  Quality:    Past Psychiatric History:    Prior hospitalizations: ***   Prior diagnoses:   Outpatient Treatment:    Suicide Attempts:    Past Psychopharmacologic Trials (including response/reactions):        LUIZ 7 SCORE 4/20/2021 3/3/2021   LUIZ-7 Total Score 13 13     Interpretation of LUIZ-7 score: 5-9 = mild anxiety, 10-14 = moderate anxiety,   15+ = severe anxiety. Recommend referral to behavioral health for scores 10 or greater.     No data recorded  Interpretation of PHQ-9 score:  1-4 = minimal depression, 5-9 = mild depression,   10-14 = moderate depression; 15-19 = moderately severe depression, 20-27 = severe depression    Past Medical/Surgical History:   Past Medical History:   Diagnosis Date    Allergic rhinitis     Anxiety disorder     Depression     HSV (herpes simplex virus) infection     IBS (irritable bowel syndrome)     SVT (supraventricular tachycardia) (HCC)     Stewart-Parkinson-White syndrome      Past Surgical History:   Procedure Laterality Date    APPENDECTOMY  07/05/1999    CATARACT REMOVAL WITH IMPLANT Left     CHOLECYSTECTOMY      COLONOSCOPY  03/2020    Dr. Abelardo Pearson    COLONOSCOPY N/A 3/5/2020    COLORECTAL CANCER SCREENING, NOT HIGH RISK performed by Alfa Campos MD at [facility] 4/23/2021    EXCISION OF LARGE GANGLION CYST RIGHT FOOT AND ANKLE performed by Cally Mars DPM at [facility]  6-8-16    with mesh    OTHER SURGICAL HISTORY      bone spur removed from front of skull    PARTIAL HYSTERECTOMY  07/16/1999    TONSILLECTOMY         Family History   Problem Relation Age of Onset    Heart Disease Father     Diabetes Father     No Known Problems Mother     Kidney Disease Paternal Aunt          PCP: Roxanna TELLEZ,       Allergies:    Allergies   Allergen Reactions    Dye [Iodides] Anaphylaxis and Shortness Of Breath     With shellfish in hives    Shellfish-Derived Products Anaphylaxis    Shrimp (Diagnostic) Anaphylaxis    Bactrim [Sulfamethoxazole-Trimethoprim]      Blisters,skin burns    Paxil [Paroxetine] Other (See Comments)     hallucuinations    Pcn [Penicillins]      Has never taken but stays away from due to cephalexin and keflex    Amoxicillin-Pot Clavulanate Rash    Cephalexin Rash    Clindamycin/Lincomycin Cross Reactors Rash    Erythromycin Rash    Nortriptyline Rash    Sertraline Rash     Skin peels    Sulfa Antibiotics Rash     Bactrim-burns skin    Tetracyclines & Related Rash         Current Medications:   Current Outpatient Medications on File Prior to Visit   Medication Sig Dispense Refill    gabapentin (NEURONTIN) 300 MG capsule TAKE ONE CAPSULE BY MOUTH THREE TIMES A DAY 90 capsule 0    amphetamine-dextroamphetamine (ADDERALL XR) 10 MG extended release capsule Take 1 capsule by mouth every morning for 30 days. 30 capsule 0    QUEtiapine (SEROQUEL) 25 MG tablet TAKE ONE TO TWO TABLETS BY MOUTH ONCE NIGHTLY 60 tablet 5    naproxen (NAPROSYN) 500 MG tablet TAKE ONE TABLET BY MOUTH TWICE A DAY WITH MEALS 60 tablet 2    hydrOXYzine (ATARAX) 25 MG tablet Take 1 tablet by mouth 3 times daily as needed for Anxiety 90 tablet 2    melatonin 3 MG TABS tablet Take 3 mg by mouth nightly as needed      cetirizine (ZYRTEC) 10 MG tablet Take 10 mg by mouth as needed       Calcium Carbonate-Vitamin D (CALTRATE 600+D PO) Take 1 tablet by mouth daily       Multiple Vitamins-Minerals (THERAPEUTIC MULTIVITAMIN-MINERALS) tablet Take 1 tablet by mouth daily      omeprazole (PRILOSEC) 20 MG capsule Take 20 mg by mouth daily as needed       senna (SENOKOT) 8.6 MG tablet Take 1 tablet by mouth daily as needed       diphenhydrAMINE (BENADRYL) 25 MG tablet Take 25 mg by mouth nightly as needed. No current facility-administered medications on file prior to visit. Controlled Substance Monitoring:      OBJECTIVE:  Vitals: Wt Readings from Last 3 Encounters:   10/18/21 187 lb (84.8 kg)   10/05/21 186 lb 3.2 oz (84.5 kg)   09/03/21 180 lb (81.6 kg)       There were no vitals filed for this visit. ROS: Denies trouble with fever, rash, headache, vision changes, chest pain, shortness of breath, nausea, extremity pain, weakness, dysuria.  ***    Mental Status Exam:     Appearance    {GENERAL APPEARANCE:88884}  Muscle strength/tone: no atrophy or abnormal movements  Gait/station: normal  Impulsive behavior {YES / NO:19727}  Speech    {SPEECH:905721187}  Mood    {[facility] AFFECT/MOOD:2885249925}  Affect    {EXAM; PSYCH SBPNAT:86715} Congruent to thought content and mood  Thought Content    {EXAM; PSYCH THOUGHT CONTENT FA:55559}, no delusions voiced  Thought Process    {THOUGHT PROCESS:071239536}   Associations    {Findings; psych thought process exam:90510::\"logical connections\"}  Perceptions: denies AH/VH, does not appear preoccupied with the internal environment, no delusions  Insight    {Assessment:5816882678}  Judgment    {FINDINGS; OT UE INTACT/IMPAIRED:4635601172}  Orientation    {Exam; orientation:76891}  Memory    {EXAM; NEURO PED ITCLVJ:51102}  Attention/Concentration    {Desc; intact/impaired:20944}  Ability to understand instructions {YES / NO:87505}  Ability to respond meaningfully {YES / RO:51645}  Language:  Fluent ***  Fund of Knowledge/Intelligence:  Average***  SI:   {SUICIDE:120771584}  HI: Denies HI    Labs:     Orders Only on 02/22/2022   Component Date Value    Amphetamine Screen, Urine 02/22/2022 Neg     Barbiturate Screen, Ur 02/22/2022 Neg     Benzodiazepine Screen, U* 02/22/2022 Neg     Cannabinoid Scrn, Ur 02/22/2022 Neg     Cocaine Metabolite Scree* 02/22/2022 Neg     Opiate Scrn, Ur 02/22/2022 Neg     PCP Screen, Urine 02/22/2022 Neg     Methadone Screen, Urine 02/22/2022 Neg     Propoxyphene Scrn, Ur 02/22/2022 Neg     Oxycodone Urine 02/22/2022 Neg     pH, UA 02/22/2022 7.0     Drug Screen Comment: 02/22/2022 see below    Orders Only on 02/16/2022   Component Date Value    Vit D, 25-Hydroxy 02/16/2022 39.7     Sodium 02/16/2022 140     Potassium 02/16/2022 4.5     Chloride 02/16/2022 101     CO2 02/16/2022 24     Anion Gap 02/16/2022 15     Glucose 02/16/2022 101*    BUN 02/16/2022 12     CREATININE 02/16/2022 0.5*    GFR Non- 02/16/2022 >60     GFR  02/16/2022 >60     Calcium 02/16/2022 10.1     Total Protein 02/16/2022 7.5     Albumin 02/16/2022 4.8     Albumin/Globulin Ratio 02/16/2022 1.8     Total Bilirubin 02/16/2022 0.4     Alkaline Phosphatase 02/16/2022 80  ALT 02/16/2022 19     AST 02/16/2022 27     WBC 02/16/2022 5.1     RBC 02/16/2022 4.58     Hemoglobin 02/16/2022 13.4     Hematocrit 02/16/2022 40.1     MCV 02/16/2022 87.6     MCH 02/16/2022 29.2     MCHC 02/16/2022 33.4     RDW 02/16/2022 14.1     Platelets 44/99/1241 306     MPV 02/16/2022 8.2     TSH Reflex FT4 02/16/2022 1.21     Hemoglobin A1C 02/16/2022 5.8     eAG 02/16/2022 119.8     Cholesterol, Total 02/16/2022 238*    Triglycerides 02/16/2022 88     HDL 02/16/2022 68*    LDL Calculated 02/16/2022 152*    VLDL Cholesterol Calcula* 02/16/2022 18    Orders Only on 10/05/2021   Component Date Value    WBC 10/05/2021 6.2     RBC 10/05/2021 4.59     Hemoglobin 10/05/2021 13.2     Hematocrit 10/05/2021 40.7     MCV 10/05/2021 88.7     MCH 10/05/2021 28.8     MCHC 10/05/2021 32.5     RDW 10/05/2021 13.9     Platelets 54/62/2154 264     MPV 10/05/2021 8.9     Neutrophils % 10/05/2021 51.5     Lymphocytes % 10/05/2021 39.0     Monocytes % 10/05/2021 6.9     Eosinophils % 10/05/2021 1.9     Basophils % 10/05/2021 0.7     Neutrophils Absolute 10/05/2021 3.2     Lymphocytes Absolute 10/05/2021 2.4     Monocytes Absolute 10/05/2021 0.4     Eosinophils Absolute 10/05/2021 0.1     Basophils Absolute 10/05/2021 0.0     TSH 10/05/2021 1.23        Last Drug screen:  Lab Results   Component Value Date    LABAMPH Neg 02/22/2022    LABBENZ Neg 02/22/2022    COCAIMETSCRU Neg 02/22/2022    LABMETH Neg 02/22/2022    OPIATESCREENURINE Neg 02/22/2022    PHENCYCLIDINESCREENURINE Neg 02/22/2022       ***    Imaging: no head imaging on file      ASSESSMENT AND PLAN    {No diagnosis found. (Refresh or delete this SmartLink)}    1. Safety: NO Imminent risk of danger to/self/others based on the factors considered below. Appropriate for outpatient level of care. Safety plan includes: 911, PES, hotlines, and interventions discussed today.      Risk factors: Age <25 or >49, male gender, depressed mood, suicidal ideation, suicidal plan, access to lethal means, prior suicide attempt,  substance abuse, chronic pain or medical illness, social isolation, history of violence, active psychosis, cognitive impairment, no outpatient services in place, medication noncompliance, and no collateral information to support safety. Protective factors: Age >24 and <55, female gender, denies depression, denies suicidal ideation, does not have lethal plan, does not have access to guns or weapons, patient is reginaldo for safety, no prior suicide attempts, no substance abuse, patient has social or family support, no active psychosis or cognitive dysfunction, physically healthy, already has outpatient services in place, compliant with recommended medications, collateral information from *** confirms patient safety, and patient is future oriented. 2. Psychiatric Plan            -Labs: reviewed in Epic, up to date      -Continue therapy with      -OARRS reviewed, c/w history  -R/b/se/a d/w pt who consents. 3. Medical  -Following with Martha Burgess DO    4. Substance   -No active issues. 5. RTC - *** weeks    Helga Parish, [address] [place] [address] Nurse Practitioner    {Time Documentation Optional:074211496}  PSYCHIATRY FOLLOW UP Juan [birth year] 03/11/22    CC: No chief complaint on file. HPI:   Referred by [address] [place] [address] DO Aditya. Jeet Kelly is a 63 y.o. female with a history of *** being evaluated by a Virtual Visit (video visit) encounter to address concerns as mentioned above. A caregiver was present when appropriate. Patient was evaluated through a synchronous (real-time) audio-video encounter. The patient (or guardian if applicable) is aware that this a billable service, which includes applicable co-pays. The virtual visit was conducted with patient or legal guardians consent to reduce the patient's risk of exposure to COVID-19.  The visit was SURGERY Right 4/23/2021    EXCISION OF LARGE GANGLION CYST RIGHT FOOT AND ANKLE performed by Mercedes Feldman DPM at [address]  6-8-16    with mesh    OTHER SURGICAL HISTORY      bone spur removed from front of skull    PARTIAL HYSTERECTOMY  07/16/1999    TONSILLECTOMY         Family History   Problem Relation Age of Onset    Heart Disease Father     Diabetes Father     No Known Problems Mother     Kidney Disease Paternal Aunt          PCP: Mati TELLEZ,       Allergies: Allergies   Allergen Reactions    Dye [Iodides] Anaphylaxis and Shortness Of Breath     With shellfish in hives    Shellfish-Derived Products Anaphylaxis    Shrimp (Diagnostic) Anaphylaxis    Bactrim [Sulfamethoxazole-Trimethoprim]      Blisters,skin burns    Paxil [Paroxetine] Other (See Comments)     hallucuinations    Pcn [Penicillins]      Has never taken but stays away from due to cephalexin and keflex    Amoxicillin-Pot Clavulanate Rash    Cephalexin Rash    Clindamycin/Lincomycin Cross Reactors Rash    Erythromycin Rash    Nortriptyline Rash    Sertraline Rash     Skin peels    Sulfa Antibiotics Rash     Bactrim-burns skin    Tetracyclines & Related Rash         Current Medications:   Current Outpatient Medications on File Prior to Visit   Medication Sig Dispense Refill    gabapentin (NEURONTIN) 300 MG capsule TAKE ONE CAPSULE BY MOUTH THREE TIMES A DAY 90 capsule 0    amphetamine-dextroamphetamine (ADDERALL XR) 10 MG extended release capsule Take 1 capsule by mouth every morning for 30 days.  30 capsule 0    QUEtiapine (SEROQUEL) 25 MG tablet TAKE ONE TO TWO TABLETS BY MOUTH ONCE NIGHTLY 60 tablet 5    naproxen (NAPROSYN) 500 MG tablet TAKE ONE TABLET BY MOUTH TWICE A DAY WITH MEALS 60 tablet 2    hydrOXYzine (ATARAX) 25 MG tablet Take 1 tablet by mouth 3 times daily as needed for Anxiety 90 tablet 2    melatonin 3 MG TABS tablet Take 3 mg by mouth nightly as needed      cetirizine (ZYRTEC) 10 MG tablet Take 10 mg by mouth as needed       Calcium Carbonate-Vitamin D (CALTRATE 600+D PO) Take 1 tablet by mouth daily       Multiple Vitamins-Minerals (THERAPEUTIC MULTIVITAMIN-MINERALS) tablet Take 1 tablet by mouth daily      omeprazole (PRILOSEC) 20 MG capsule Take 20 mg by mouth daily as needed       senna (SENOKOT) 8.6 MG tablet Take 1 tablet by mouth daily as needed       diphenhydrAMINE (BENADRYL) 25 MG tablet Take 25 mg by mouth nightly as needed. No current facility-administered medications on file prior to visit. Controlled Substance Monitoring:        OBJECTIVE:  Vitals:    Wt Readings from Last 3 Encounters:   10/18/21 187 lb (84.8 kg)   10/05/21 186 lb 3.2 oz (84.5 kg)   09/03/21 180 lb (81.6 kg)     ROS: Denies trouble with fever, rash, headache, vision changes, chest pain, shortness of breath, nausea, extremity pain, weakness, dysuria. + Left foot pain     Mental Status Exam:      Appearance    alert, cooperative  Muscle strength/tone: no atrophy or abnormal movements  Gait/station: normal  Impulsive behavior Yes  Speech    spontaneous, normal rate and normal volume  Mood    Anxious  Affect    Anxious Congruent to thought content and mood  Thought Content    hopelessness, helplessness and excessive guilt, no delusions voiced  Thought Process    linear   Associations    logical connections  Perceptions: denies AH/VH, does not appear preoccupied with the internal environment, no delusions  Insight    Good  Judgment    Intact  Orientation    oriented to person, place, time, and general circumstances  Memory    recent and remote memory intact  Attention/Concentration    intact  Ability to understand instructions Yes  Ability to respond meaningfully Yes  Language: [address] [facility] of Knowledge/Intelligence:  Average  SI:   no suicidal ideation  HI: Denies HI    Labs:     Orders Only on 02/22/2022   Component Date Value    Amphetamine Screen, Urine 02/22/2022 Neg     Barbiturate Screen, Ur 02/22/2022 Neg     Benzodiazepine Screen, U* 02/22/2022 Neg     Cannabinoid Scrn, Ur 02/22/2022 Neg     Cocaine Metabolite Scree* 02/22/2022 Neg     Opiate Scrn, Ur 02/22/2022 Neg     PCP Screen, Urine 02/22/2022 Neg     Methadone Screen, Urine 02/22/2022 Neg     Propoxyphene Scrn, Ur 02/22/2022 Neg     Oxycodone Urine 02/22/2022 Neg     pH, UA 02/22/2022 7.0     Drug Screen Comment: 02/22/2022 see below    Orders Only on 02/16/2022   Component Date Value    Vit D, 25-Hydroxy 02/16/2022 39.7     Sodium 02/16/2022 140     Potassium 02/16/2022 4.5     Chloride 02/16/2022 101     CO2 02/16/2022 24     Anion Gap 02/16/2022 15     Glucose 02/16/2022 101*    BUN 02/16/2022 12     CREATININE 02/16/2022 0.5*    GFR Non- 02/16/2022 >60     GFR  02/16/2022 >60     Calcium 02/16/2022 10.1     Total Protein 02/16/2022 7.5     Albumin 02/16/2022 4.8     Albumin/Globulin Ratio 02/16/2022 1.8     Total Bilirubin 02/16/2022 0.4     Alkaline Phosphatase 02/16/2022 80     ALT 02/16/2022 19     AST 02/16/2022 27     WBC 02/16/2022 5.1     RBC 02/16/2022 4.58     Hemoglobin 02/16/2022 13.4     Hematocrit 02/16/2022 40.1     MCV 02/16/2022 87.6     MCH 02/16/2022 29.2     MCHC 02/16/2022 33.4     RDW 02/16/2022 14.1     Platelets 05/02/4984 306     MPV 02/16/2022 8.2     TSH Reflex FT4 02/16/2022 1.21     Hemoglobin A1C 02/16/2022 5.8     eAG 02/16/2022 119.8     Cholesterol, Total 02/16/2022 238*    Triglycerides 02/16/2022 88     HDL 02/16/2022 68*    LDL Calculated 02/16/2022 152*    VLDL Cholesterol Calcula* 02/16/2022 18    Orders Only on 10/05/2021   Component Date Value    WBC 10/05/2021 6.2     RBC 10/05/2021 4.59     Hemoglobin 10/05/2021 13.2     Hematocrit 10/05/2021 40.7     MCV 10/05/2021 88.7     MCH 10/05/2021 28.8     MCHC 10/05/2021 32.5     RDW 10/05/2021 13.9     Platelets 56/81/5307 264     MPV 10/05/2021 8.9     Neutrophils % 10/05/2021 51.5     Lymphocytes % 10/05/2021 39.0     Monocytes % 10/05/2021 6.9     Eosinophils % 10/05/2021 1.9     Basophils % 10/05/2021 0.7     Neutrophils Absolute 10/05/2021 3.2     Lymphocytes Absolute 10/05/2021 2.4     Monocytes Absolute 10/05/2021 0.4     Eosinophils Absolute 10/05/2021 0.1     Basophils Absolute 10/05/2021 0.0     TSH 10/05/2021 1.23        Last Drug screen:  Lab Results   Component Value Date    LABAMPH Neg 02/22/2022    LABBENZ Neg 02/22/2022    COCAIMETSCRU Neg 02/22/2022    LABMETH Neg 02/22/2022    OPIATESCREENURINE Neg 02/22/2022    PHENCYCLIDINESCREENURINE Neg 02/22/2022         Imaging: no head imaging on file      ASSESSMENT AND PLAN    {No diagnosis found. (Refresh or delete this SmartLink)}    1. Safety: NO Imminent risk of danger to/self/others based on the factors considered below. Appropriate for outpatient level of care.  Safety plan includes: 911, PES, hotlines, and interventions discussed today.      Risk factors: Age <25 or >55,  depressed mood, suicidal ideation, suicidal plan, access to lethal means, prior suicide attempt, family h/o completed suicide, substance abuse, chronic pain or medical illness, social isolation, history of violence, active psychosis, cognitive impairment, no outpatient services in place, medication noncompliance, and no collateral information to support safety.     Protective factors:female gender, denies suicidal ideation, does not have lethal plan,no prior suicide attempts, no family h/o suicide, no substance abuse, patient has social or family support, no active psychosis or cognitive dysfunction, physically healthy,compliant with recommended medications, and patient is future oriented.        2. Psychiatric Plan     MDD, severe, without psychotic features/LUIZ: Patient struggling with severe depressive sx related to issues she has had to deal with over the years.  Issues with son have heightened her depression and anxiety. April and May worse months as she is triggered.     -Continue Hydroxyzine 25 mg TID for anxiety and sleep. Side effect discussed. Beneficial to patient.      - Continue Seroquel 25 mg 1-2 tablets nightly for sleep/mood issues/MDD augmentation. Will monitor metabolic side effects with this.     ADHD, inattentive:     -Increase Adderall 15 mg XR once daily for ADHD symptoms. Positive family history including son, grandson. Endorses symptoms throughout her lifetime in school prior to elementary and has continued to struggle with them currently. Side effects discussed. No chance of pregnancy. No hx of cardiac events. Med contract signed today. UDS complete and clean.      -Labs: reviewed in Epic  UDS clean.      -Recommend outpt therapy.  Declines at this time.     -OARRS reviewed, c/w history  -R/b/se/a d/w pt who consents.     3. Medical  -Following with CAROLE TELLEZ DO     4. Substance   -No active issues.     5.  RTC - 4 weeks     Malorie Ford, [address] [place] [address], [place] Nurse Practitioner      {Time Documentation Optional:[id number]}

## 2022-03-21 ENCOUNTER — TELEPHONE (OUTPATIENT)
Dept: FAMILY MEDICINE CLINIC | Age: 62
End: 2022-03-21

## 2022-03-21 NOTE — TELEPHONE ENCOUNTER
----- Message from Bel Villagomez sent at 3/21/2022  8:51 AM EDT -----  Subject: Message to Provider    QUESTIONS  Information for Provider? Pt requesting a call back from a nurse, Pt had a   tick on her,  pulled the tick off which was dead, however Pt stated   the area is still red and thinks part of the tick in still on the left   side of her rib area. Pt is leaving for vacation and would like to know   what exactly she should do.  ---------------------------------------------------------------------------  --------------  CALL BACK INFO  What is the best way for the office to contact you? OK to leave message on   voicemail  Preferred Call Back Phone Number? [phone]  ---------------------------------------------------------------------------  --------------  SCRIPT ANSWERS  Relationship to Patient?  Self

## 2022-04-08 RX ORDER — GABAPENTIN 300 MG/1
CAPSULE ORAL
Qty: 90 CAPSULE | Refills: 0 | Status: SHIPPED | OUTPATIENT
Start: 2022-04-08 | End: 2022-05-13

## 2022-04-29 ENCOUNTER — TELEPHONE (OUTPATIENT)
Dept: FAMILY MEDICINE CLINIC | Age: 62
End: 2022-04-29

## 2022-04-29 NOTE — TELEPHONE ENCOUNTER
Pt called and states that she thought. She does not want her meds changes states she is at a good dosage. Asking if she can talk sooner than July.

## 2022-05-02 DIAGNOSIS — F90.0 ADHD (ATTENTION DEFICIT HYPERACTIVITY DISORDER), INATTENTIVE TYPE: ICD-10-CM

## 2022-05-02 RX ORDER — DEXTROAMPHETAMINE SACCHARATE, AMPHETAMINE ASPARTATE MONOHYDRATE, DEXTROAMPHETAMINE SULFATE AND AMPHETAMINE SULFATE 3.75; 3.75; 3.75; 3.75 MG/1; MG/1; MG/1; MG/1
15 CAPSULE, EXTENDED RELEASE ORAL DAILY
Qty: 30 CAPSULE | Refills: 0 | Status: SHIPPED
Start: 2022-05-02 | End: 2022-06-06 | Stop reason: SDUPTHER

## 2022-05-03 ENCOUNTER — TELEPHONE (OUTPATIENT)
Dept: FAMILY MEDICINE CLINIC | Age: 62
End: 2022-05-03

## 2022-05-03 NOTE — TELEPHONE ENCOUNTER
----- Message from Elizabeth Ortez MA sent at 5/3/2022  3:48 PM EDT -----  Subject: Message to Provider    QUESTIONS  Information for Provider? Patient is returning the missed call from   [facility]  ---------------------------------------------------------------------------  --------------  [address]  What is the best way for the office to contact you? OK to leave message on   [employer], OK to respond with electronic message via [employer] portal (only   for patients who have registered [employer] account)  Preferred Call Back Phone Number?  [phone]  ---------------------------------------------------------------------------  --------------  SCRIPT ANSWERS  undefined

## 2022-05-05 DIAGNOSIS — F90.0 ADHD (ATTENTION DEFICIT HYPERACTIVITY DISORDER), INATTENTIVE TYPE: Primary | ICD-10-CM

## 2022-05-05 RX ORDER — DEXTROAMPHETAMINE SACCHARATE, AMPHETAMINE ASPARTATE MONOHYDRATE, DEXTROAMPHETAMINE SULFATE AND AMPHETAMINE SULFATE 3.75; 3.75; 3.75; 3.75 MG/1; MG/1; MG/1; MG/1
15 CAPSULE, EXTENDED RELEASE ORAL DAILY
Qty: 30 CAPSULE | Refills: 0 | Status: SHIPPED
Start: 2022-07-01 | End: 2022-06-06 | Stop reason: SDUPTHER

## 2022-05-05 RX ORDER — DEXTROAMPHETAMINE SACCHARATE, AMPHETAMINE ASPARTATE MONOHYDRATE, DEXTROAMPHETAMINE SULFATE AND AMPHETAMINE SULFATE 3.75; 3.75; 3.75; 3.75 MG/1; MG/1; MG/1; MG/1
15 CAPSULE, EXTENDED RELEASE ORAL DAILY
Qty: 30 CAPSULE | Refills: 0 | Status: SHIPPED
Start: 2022-06-02 | End: 2022-06-06 | Stop reason: SDUPTHER

## 2022-05-05 NOTE — PROGRESS NOTES
Refills sent for months of June and July of Adderall with DNF dates. Patient scheduled at next available.

## 2022-05-13 RX ORDER — GABAPENTIN 300 MG/1
CAPSULE ORAL
Qty: 90 CAPSULE | Refills: 0 | Status: SHIPPED | OUTPATIENT
Start: 2022-05-13 | End: 2022-06-10 | Stop reason: SDUPTHER

## 2022-05-31 ENCOUNTER — HOSPITAL ENCOUNTER (OUTPATIENT)
Dept: WOMENS IMAGING | Age: 62
Discharge: HOME | End: 2022-05-31
Payer: COMMERCIAL

## 2022-05-31 DIAGNOSIS — Z12.31 BREAST CANCER SCREENING BY MAMMOGRAM: ICD-10-CM

## 2022-05-31 PROCEDURE — 77063 BREAST TOMOSYNTHESIS BI: CPT

## 2022-06-06 ENCOUNTER — TELEPHONE (OUTPATIENT)
Dept: FAMILY MEDICINE CLINIC | Age: 62
End: 2022-06-06

## 2022-06-06 DIAGNOSIS — F90.2 ATTENTION DEFICIT HYPERACTIVITY DISORDER (ADHD), COMBINED TYPE: Primary | ICD-10-CM

## 2022-06-06 RX ORDER — DEXTROAMPHETAMINE SACCHARATE, AMPHETAMINE ASPARTATE MONOHYDRATE, DEXTROAMPHETAMINE SULFATE AND AMPHETAMINE SULFATE 3.75; 3.75; 3.75; 3.75 MG/1; MG/1; MG/1; MG/1
15 CAPSULE, EXTENDED RELEASE ORAL EVERY MORNING
Qty: 30 CAPSULE | Refills: 0 | Status: SHIPPED | OUTPATIENT
Start: 2022-06-06 | End: 2022-07-08

## 2022-06-06 NOTE — TELEPHONE ENCOUNTER
Nurse practitioner is off on vacation. I checked OARRS and last fill of adderall Xr 15 mg by [facility] was on 5/3/2022 for 30 capsules. No concerns for abusing medication. I sent in refill for this medication for another 30 days. Please let patient know that I have refilled her medication for the next 30 days starting today.

## 2022-06-06 NOTE — TELEPHONE ENCOUNTER
Gemma PCP will not refill can you review chart and see if you can refill enough tabs to get her through [facility] returning 6/13/22

## 2022-06-06 NOTE — TELEPHONE ENCOUNTER
Coverage for meds and messages while provider is out has always been PCP    Unsure what to do for this pt

## 2022-06-10 RX ORDER — GABAPENTIN 300 MG/1
CAPSULE ORAL
Qty: 90 CAPSULE | Refills: 0 | Status: SHIPPED | OUTPATIENT
Start: 2022-06-10 | End: 2022-07-08

## 2022-07-08 ENCOUNTER — OFFICE VISIT (OUTPATIENT)
Dept: PSYCHIATRY | Age: 62
End: 2022-07-08
Payer: COMMERCIAL

## 2022-07-08 DIAGNOSIS — F41.1 GAD (GENERALIZED ANXIETY DISORDER): ICD-10-CM

## 2022-07-08 DIAGNOSIS — F90.2 ATTENTION DEFICIT HYPERACTIVITY DISORDER (ADHD), COMBINED TYPE: ICD-10-CM

## 2022-07-08 DIAGNOSIS — G47.9 SLEEP DIFFICULTIES: ICD-10-CM

## 2022-07-08 DIAGNOSIS — F33.2 SEVERE EPISODE OF RECURRENT MAJOR DEPRESSIVE DISORDER, WITHOUT PSYCHOTIC FEATURES (HCC): ICD-10-CM

## 2022-07-08 DIAGNOSIS — F33.1 MDD (MAJOR DEPRESSIVE DISORDER), RECURRENT EPISODE, MODERATE (HCC): ICD-10-CM

## 2022-07-08 DIAGNOSIS — F90.0 ADHD (ATTENTION DEFICIT HYPERACTIVITY DISORDER), INATTENTIVE TYPE: Primary | ICD-10-CM

## 2022-07-08 PROCEDURE — 99214 OFFICE O/P EST MOD 30 MIN: CPT | Performed by: NURSE PRACTITIONER

## 2022-07-08 RX ORDER — DEXTROAMPHETAMINE SACCHARATE, AMPHETAMINE ASPARTATE MONOHYDRATE, DEXTROAMPHETAMINE SULFATE AND AMPHETAMINE SULFATE 6.25; 6.25; 6.25; 6.25 MG/1; MG/1; MG/1; MG/1
25 CAPSULE, EXTENDED RELEASE ORAL EVERY MORNING
Qty: 30 CAPSULE | Refills: 0 | Status: SHIPPED | OUTPATIENT
Start: 2022-07-08 | End: 2022-08-05

## 2022-07-08 RX ORDER — QUETIAPINE FUMARATE 25 MG/1
TABLET, FILM COATED ORAL
Qty: 60 TABLET | Refills: 3 | Status: SHIPPED | OUTPATIENT
Start: 2022-07-08 | End: 2022-10-14 | Stop reason: SDUPTHER

## 2022-07-08 RX ORDER — GABAPENTIN 300 MG/1
CAPSULE ORAL
Qty: 90 CAPSULE | Refills: 0 | Status: SHIPPED | OUTPATIENT
Start: 2022-07-08 | End: 2022-08-05 | Stop reason: SDUPTHER

## 2022-07-08 RX ORDER — HYDROXYZINE HYDROCHLORIDE 25 MG/1
25 TABLET, FILM COATED ORAL 3 TIMES DAILY PRN
Qty: 90 TABLET | Refills: 3 | Status: SHIPPED | OUTPATIENT
Start: 2022-07-08

## 2022-07-08 RX ORDER — DEXTROAMPHETAMINE SACCHARATE, AMPHETAMINE ASPARTATE, DEXTROAMPHETAMINE SULFATE AND AMPHETAMINE SULFATE 2.5; 2.5; 2.5; 2.5 MG/1; MG/1; MG/1; MG/1
10 TABLET ORAL DAILY
Qty: 30 TABLET | Refills: 0 | Status: SHIPPED | OUTPATIENT
Start: 2022-07-08 | End: 2022-08-05

## 2022-07-08 ASSESSMENT — PATIENT HEALTH QUESTIONNAIRE - PHQ9
8. MOVING OR SPEAKING SO SLOWLY THAT OTHER PEOPLE COULD HAVE NOTICED. OR THE OPPOSITE, BEING SO FIGETY OR RESTLESS THAT YOU HAVE BEEN MOVING AROUND A LOT MORE THAN USUAL: 1
SUM OF ALL RESPONSES TO PHQ QUESTIONS 1-9: 7
SUM OF ALL RESPONSES TO PHQ9 QUESTIONS 1 & 2: 2
4. FEELING TIRED OR HAVING LITTLE ENERGY: 0
2. FEELING DOWN, DEPRESSED OR HOPELESS: 1
SUM OF ALL RESPONSES TO PHQ QUESTIONS 1-9: 7
3. TROUBLE FALLING OR STAYING ASLEEP: 1
5. POOR APPETITE OR OVEREATING: 3
SUM OF ALL RESPONSES TO PHQ QUESTIONS 1-9: 7
SUM OF ALL RESPONSES TO PHQ QUESTIONS 1-9: 7
7. TROUBLE CONCENTRATING ON THINGS, SUCH AS READING THE NEWSPAPER OR WATCHING TELEVISION: 0
9. THOUGHTS THAT YOU WOULD BE BETTER OFF DEAD, OR OF HURTING YOURSELF: 0
6. FEELING BAD ABOUT YOURSELF - OR THAT YOU ARE A FAILURE OR HAVE LET YOURSELF OR YOUR FAMILY DOWN: 0
10. IF YOU CHECKED OFF ANY PROBLEMS, HOW DIFFICULT HAVE THESE PROBLEMS MADE IT FOR YOU TO DO YOUR WORK, TAKE CARE OF THINGS AT HOME, OR GET ALONG WITH OTHER PEOPLE: 1
1. LITTLE INTEREST OR PLEASURE IN DOING THINGS: 1

## 2022-07-08 ASSESSMENT — ANXIETY QUESTIONNAIRES
7. FEELING AFRAID AS IF SOMETHING AWFUL MIGHT HAPPEN: 3-NEARLY EVERY DAY
GAD7 TOTAL SCORE: 18
5. BEING SO RESTLESS THAT IT IS HARD TO SIT STILL: 2-OVER HALF THE DAYS
3. WORRYING TOO MUCH ABOUT DIFFERENT THINGS: 3-NEARLY EVERY DAY
6. BECOMING EASILY ANNOYED OR IRRITABLE: 3-NEARLY EVERY DAY
4. TROUBLE RELAXING: 2-OVER HALF THE DAYS
1. FEELING NERVOUS, ANXIOUS, OR ON EDGE: 2
2. NOT BEING ABLE TO STOP OR CONTROL WORRYING: 3-NEARLY EVERY DAY

## 2022-07-08 NOTE — PROGRESS NOTES
AM)  Thoughts that you would be better off dead, or of hurting yourself in some way: 0 (7/8/2022 10:05 AM)    Interpretation of PHQ-9 score:  1-4 = minimal depression, 5-9 = mild depression,   10-14 = moderate depression; 15-19 = moderately severe depression, 20-27 = severe depression    Past Medical/Surgical History:   Past Medical History:   Diagnosis Date    Allergic rhinitis     Anxiety disorder     Depression     HSV (herpes simplex virus) infection     IBS (irritable bowel syndrome)     SVT [facility]ra[facility]ricular tachycardia) ([facility] [address]     Stewart-Parkinson-White syndrome      Past Surgical History:   Procedure Laterality Date    APPENDECTOMY  07/05/1999    CATARACT REMOVAL WITH IMPLANT Left     CHOLECYSTECTOMY      COLONOSCOPY  03/2020    Dr. Kandi Watson    COLONOSCOPY N/A 3/5/2020    COLORECTAL CANCER SCREENING, NOT HIGH RISK performed by Adrianna Bazzi MD at [address] [facility] 4/23/2021    EXCISION OF LARGE GANGLION CYST RIGHT FOOT AND ANKLE performed by Dimitry Vega DPM at [address] [facility]  6-8-16    with mesh    OTHER SURGICAL HISTORY      bone spur removed from front of skull    PARTIAL HYSTERECTOMY (CERVIX NOT REMOVED)  07/16/1999    TONSILLECTOMY         Family History   Problem Relation Age of Onset    Heart Disease Father     Diabetes Father     No Known Problems Mother     Kidney Disease Paternal Aunt          PCP: Dharmesh TELLEZ DO      Allergies:    Allergies   Allergen Reactions    Dye [Iodides] Anaphylaxis and Shortness Of Breath     With shellfish in hives    Shellfish-Derived Products Anaphylaxis    Shrimp (Diagnostic) Anaphylaxis    Bactrim [Sulfamethoxazole-Trimethoprim]      Blisters,skin burns    Paxil [Paroxetine] Other (See Comments)     hallucuinations    Pcn [Penicillins]      Has never taken but stays away from due to cephalexin and keflex    Amoxicillin-Pot Clavulanate Rash    Cephalexin Rash  Clindamycin/Lincomycin Cross Reactors Rash    Erythromycin Rash    Nortriptyline Rash    Sertraline Rash     Skin peels    Sulfa Antibiotics Rash     Bactrim-burns skin    Tetracyclines & Related Rash         Current Medications:   Current Outpatient Medications on File Prior to Visit   Medication Sig Dispense Refill    gabapentin (NEURONTIN) 300 MG capsule Take 1 by mouth three times daily 90 capsule 0    naproxen (NAPROSYN) 500 MG tablet TAKE ONE TABLET BY MOUTH TWICE A DAY WITH MEALS 60 tablet 2    melatonin 3 MG TABS tablet Take 3 mg by mouth nightly as needed      cetirizine (ZYRTEC) 10 MG tablet Take 10 mg by mouth as needed       Calcium Carbonate-Vitamin D (CALTRATE 600+D PO) Take 1 tablet by mouth daily       Multiple Vitamins-Minerals (THERAPEUTIC MULTIVITAMIN-MINERALS) tablet Take 1 tablet by mouth daily      omeprazole (PRILOSEC) 20 MG capsule Take 20 mg by mouth daily as needed       senna (SENOKOT) 8.6 MG tablet Take 1 tablet by mouth daily as needed       diphenhydrAMINE (BENADRYL) 25 MG tablet Take 25 mg by mouth nightly as needed. No current facility-administered medications on file prior to visit. Controlled Substance Monitoring:  PDMP Monitoring:    Last PDMP Jason Francisco as Reviewed [facility]):  Review User Review Instant Review Result   JUAQUIN CARNEY 7/8/2022 10:27 AM Reviewed PDMP [1]       Urine Drug Screenings (1 yr)     DRUG SCREEN MULTI URINE  Collected: 2/22/2022  9:52 AM (Final result)   Narrative: Performed at:  [place] [address]   Phone [phone]     Complete Results              Medication Contract and Consent for Opioid Use Documents Filed     Patient Documents     Type of Document Status Date Received Received By Description    Medication Contract Received 2/18/2022 11:31 AM TAMIKA TORRES Medication Contract                  OBJECTIVE:  Vitals:    Wt Readings from Last 3 Encounters: 10/18/21 187 lb (84.8 kg)   10/05/21 186 lb 3.2 oz (84.5 kg)   09/03/21 180 lb (81.6 kg)     ROS: Denies trouble with fever, rash, headache, vision changes, chest pain, shortness of breath, nausea, extremity pain, weakness, dysuria. + Left foot pain     Mental Status Exam:      Appearance    alert, cooperative  Muscle strength/tone: no atrophy or abnormal movements  Gait/station: normal  Impulsive behavior Yes  Speech    spontaneous, normal rate and normal volume  Mood    Anxious, Tearful, Depressed  Affect    Anxious Congruent to thought content and mood  Thought Content    hopelessness, helplessness and excessive guilt, no delusions voiced  Thought Process    linear   Associations    logical connections  Perceptions: denies AH/VH, does not appear preoccupied with the internal environment, no delusions  Insight    Good  Judgment    Intact  Orientation    oriented to person, place, time, and general circumstances  Memory    recent and remote memory intact  Attention/Concentration    intact  Ability to understand instructions Yes  Ability to respond meaningfully Yes  Language:  Fluent   Fund of Knowledge/Intelligence:  Average  SI:   no suicidal ideation  HI: Denies HI      Labs:     Orders Only on 02/22/2022   Component Date Value    Amphetamine Screen, Urine 02/22/2022 Neg     Barbiturate Screen, Ur 02/22/2022 Neg     Benzodiazepine Screen, U* 02/22/2022 Neg     Cannabinoid Scrn, Ur 02/22/2022 Neg     Cocaine Metabolite Scree* 02/22/2022 Neg     Opiate Scrn, Ur 02/22/2022 Neg     PCP Screen, Urine 02/22/2022 Neg     Methadone Screen, Urine 02/22/2022 Neg     Propoxyphene Scrn, Ur 02/22/2022 Neg     Oxycodone Urine 02/22/2022 Neg     pH, UA 02/22/2022 7.0     Drug Screen Comment: 02/22/2022 see below    Orders Only on 02/16/2022   Component Date Value    Vit D, 25-Hydroxy 02/16/2022 39.7     Sodium 02/16/2022 140     Potassium 02/16/2022 4.5     Chloride 02/16/2022 101     CO2 02/16/2022 24     QUEtiapine (SEROQUEL) 25 MG tablet   6. Sleep difficulties  QUEtiapine (SEROQUEL) 25 MG tablet       1. Safety: NO Imminent risk of danger to/self/others based on the factors considered below. Appropriate for outpatient level of care.  Safety plan includes: 911, PES, hotlines, and interventions discussed today.      Risk factors: Age <25 or >55,  depressed mood, suicidal ideation, suicidal plan, access to lethal means, prior suicide attempt, family h/o completed suicide, substance abuse, chronic pain or medical illness, social isolation, history of violence, active psychosis, cognitive impairment, no outpatient services in place, medication noncompliance, and no collateral information to support safety.     Protective factors:female gender, denies suicidal ideation, does not have lethal plan,no prior suicide attempts, no family h/o suicide, no substance abuse, patient has social or family support, no active psychosis or cognitive dysfunction, physically healthy,compliant with recommended medications, and patient is future oriented.        2. Psychiatric Plan     MDD, severe, without psychotic features/LUIZ: Patient struggling with severe depressive sx related to issues she has had to deal with over the years. Issues with son have heightened her depression and anxiety. April and May worse months as she is triggered.     -Continue Hydroxyzine 25 mg TID for anxiety and sleep. Side effect discussed. Beneficial to patient.      - Continue Seroquel 25 mg 1-2 tablets nightly for sleep/mood issues/MDD augmentation. Will monitor metabolic side effects with this.     ADHD, inattentive:     -Increase Adderall 25 mg XR once daily for ADHD symptoms. Positive family history including son, grandson. Endorses symptoms throughout her lifetime in school prior to elementary and has continued to struggle with them currently. Side effects discussed. No chance of pregnancy. No hx of cardiac events. Add Adderall 10 mg IR once daily as needed.      -Labs: reviewed in Epic  Ordered UDS for controlled substance. Clean.       Ordered CBC, CMP, vitamin d, tsh, lipid panel with Seroquel. Lipids elevated since last one done in 2017. Will check again in 6 months, may need to d/c Seroquel.      -Recommend outpt therapy.  Declines at this time.     -OARRS reviewed, c/w history  -R/b/se/a d/w pt who consents.     3. Medical  -Following with CAROLE TELLEZ DO     4. Substance   -No active issues.     5. RTC - 3 months     Marcelina Stallworth, [address] [place] [address], Ne Nurse Practitioner    On this date 7/8/2022 I have spent 30 minutes reviewing previous notes, test results and face to face with the patient discussing the diagnosis and importance of compliance with the treatment plan as well as documenting on the day of the visit.

## 2022-07-13 ENCOUNTER — TELEMEDICINE (OUTPATIENT)
Dept: FAMILY MEDICINE CLINIC | Age: 62
End: 2022-07-13
Payer: COMMERCIAL

## 2022-07-13 DIAGNOSIS — B96.89 ACUTE BACTERIAL SINUSITIS: Primary | ICD-10-CM

## 2022-07-13 DIAGNOSIS — J01.90 ACUTE BACTERIAL SINUSITIS: Primary | ICD-10-CM

## 2022-07-13 PROCEDURE — 99213 OFFICE O/P EST LOW 20 MIN: CPT | Performed by: INTERNAL MEDICINE

## 2022-07-13 RX ORDER — AZITHROMYCIN 250 MG/1
250 TABLET, FILM COATED ORAL SEE ADMIN INSTRUCTIONS
Qty: 6 TABLET | Refills: 0 | Status: SHIPPED | OUTPATIENT
Start: 2022-07-13 | End: 2022-07-18

## 2022-07-13 SDOH — ECONOMIC STABILITY: TRANSPORTATION INSECURITY
IN THE PAST 12 MONTHS, HAS LACK OF TRANSPORTATION KEPT YOU FROM MEETINGS, WORK, OR FROM GETTING THINGS NEEDED FOR DAILY LIVING?: NO

## 2022-07-13 SDOH — ECONOMIC STABILITY: FOOD INSECURITY: WITHIN THE PAST 12 MONTHS, YOU WORRIED THAT YOUR FOOD WOULD RUN OUT BEFORE YOU GOT MONEY TO BUY MORE.: NEVER TRUE

## 2022-07-13 SDOH — ECONOMIC STABILITY: FOOD INSECURITY: WITHIN THE PAST 12 MONTHS, THE FOOD YOU BOUGHT JUST DIDN'T LAST AND YOU DIDN'T HAVE MONEY TO GET MORE.: NEVER TRUE

## 2022-07-13 ASSESSMENT — SOCIAL DETERMINANTS OF HEALTH (SDOH): HOW HARD IS IT FOR YOU TO PAY FOR THE VERY BASICS LIKE FOOD, HOUSING, MEDICAL CARE, AND HEATING?: NOT HARD AT ALL

## 2022-07-13 ASSESSMENT — ENCOUNTER SYMPTOMS
SHORTNESS OF BREATH: 0
DIARRHEA: 0
ABDOMINAL PAIN: 0
CONSTIPATION: 0
APNEA: 0
COUGH: 1

## 2022-07-13 NOTE — PROGRESS NOTES
TAKE ONE TABLET BY MOUTH TWICE A DAY WITH MEALS 60 tablet 2    melatonin 3 MG TABS tablet Take 3 mg by mouth nightly as needed      cetirizine (ZYRTEC) 10 MG tablet Take 10 mg by mouth as needed       Calcium Carbonate-Vitamin D (CALTRATE 600+D PO) Take 1 tablet by mouth daily       Multiple Vitamins-Minerals (THERAPEUTIC MULTIVITAMIN-MINERALS) tablet Take 1 tablet by mouth daily      omeprazole (PRILOSEC) 20 MG capsule Take 20 mg by mouth daily as needed       senna (SENOKOT) 8.6 MG tablet Take 1 tablet by mouth daily as needed       diphenhydrAMINE (BENADRYL) 25 MG tablet Take 25 mg by mouth nightly as needed. No current facility-administered medications for this visit.      Allergies: Dye [iodides], Shellfish-derived products, Shrimp (diagnostic), Bactrim [sulfamethoxazole-trimethoprim], Paxil [paroxetine], Pcn [penicillins], Amoxicillin-pot clavulanate, Cephalexin, Clindamycin/lincomycin cross reactors, Erythromycin, Nortriptyline, Sertraline, Sulfa antibiotics, and Tetracyclines & related  Past Medical History:   Diagnosis Date    Allergic rhinitis     Anxiety disorder     Depression     HSV (herpes simplex virus) infection     IBS (irritable bowel syndrome)     SVT [facility]ra[facility]ricular tachycardia) ([facility] [address]     Stewart-Parkinson-White syndrome      Past Surgical History:   Procedure Laterality Date    APPENDECTOMY  07/05/1999    CATARACT REMOVAL WITH IMPLANT Left     CHOLECYSTECTOMY      COLONOSCOPY  03/2020    Dr. Gordy Schulte    COLONOSCOPY N/A 3/5/2020    COLORECTAL CANCER SCREENING, NOT HIGH RISK performed by Marsha Jose MD at [facility] 4/23/2021    EXCISION OF LARGE GANGLION CYST RIGHT FOOT AND ANKLE performed by Inocente Isaac DPM at [address] [facility]  6-8-16    with mesh    OTHER SURGICAL HISTORY      bone spur removed from front of skull    PARTIAL HYSTERECTOMY (CERVIX NOT REMOVED)  07/16/1999    TONSILLECTOMY       Family History   Problem Relation Age of Onset    Heart Disease Father     Diabetes Father     No Known Problems Mother     Kidney Disease Paternal Aunt      Social History     Tobacco Use    Smoking status: Former Smoker     Quit date: 2008     Years since quittin.0    Smokeless tobacco: Never Used   Substance Use Topics    Alcohol use: Yes     Comment: social use       Yesi Leonardo (:  1960) has requested an audio/video evaluation for the following concern(s):    Chief Complaint   Patient presents with    Congestion     headache, sore throat, hoarse x 2 days, fever yesterday - pt located in [address]       Review of Systems   Constitutional: Positive for fever. HENT:        Patient presents with:  Congestion: headache, sore throat, hoarse x 2 days, fever yesterday - pt located in [address]     Respiratory: Positive for cough. Negative for apnea and shortness of breath. Cardiovascular: Negative for chest pain. Gastrointestinal: Negative for abdominal pain, constipation and diarrhea. Genitourinary: Negative for dysuria and frequency. Neurological: Negative for dizziness and headaches. Prior to Visit Medications    Medication Sig Taking? Authorizing Provider   gabapentin (NEURONTIN) 300 MG capsule TAKE ONE CAPSULE BY MOUTH THREE TIMES A DAY Yes Halie Daniels DO   amphetamine-dextroamphetamine (ADDERALL XR) 25 MG extended release capsule Take 1 capsule by mouth every morning for 30 days. Yes PATRICK Hawkins NP   amphetamine-dextroamphetamine (ADDERALL, 10MG,) 10 MG tablet Take 1 tablet by mouth daily for 30 days.  Yes PATRICK Hawkins NP   QUEtiapine (SEROQUEL) 25 MG tablet Take 1-2 tablets nightly Yes PATRICK Hawkins NP   hydrOXYzine HCl (ATARAX) 25 MG tablet Take 1 tablet by mouth 3 times daily as needed for Anxiety Yes PATRICK Hawkins NP   naproxen (NAPROSYN) 500 MG tablet TAKE ONE TABLET BY MOUTH TWICE A DAY WITH MEALS Yes Azalia Soria, DO   melatonin 3 MG TABS tablet Take 3 mg by mouth nightly as needed Yes Historical Provider, MD   cetirizine (ZYRTEC) 10 MG tablet Take 10 mg by mouth as needed  Yes Historical Provider, MD   Calcium Carbonate-Vitamin D (CALTRATE 600+D PO) Take 1 tablet by mouth daily  Yes Historical Provider, MD   Multiple Vitamins-Minerals (THERAPEUTIC MULTIVITAMIN-MINERALS) tablet Take 1 tablet by mouth daily Yes Historical Provider, MD   omeprazole (PRILOSEC) 20 MG capsule Take 20 mg by mouth daily as needed  Yes Historical Provider, MD   senna (SENOKOT) 8.6 MG tablet Take 1 tablet by mouth daily as needed  Yes Historical Provider, MD   diphenhydrAMINE (BENADRYL) 25 MG tablet Take 25 mg by mouth nightly as needed. Yes Historical Provider, MD       Social History     Tobacco Use    Smoking status: Former Smoker     Quit date: 2008     Years since quittin.0    Smokeless tobacco: Never Used   Vaping Use    Vaping Use: Never used   Substance Use Topics    Alcohol use: Yes     Comment: social use    Drug use: Never            PHYSICAL EXAMINATION:  [ INSTRUCTIONS:  \"[x]\" Indicates a positive item  \"[]\" Indicates a negative item  -- DELETE ALL ITEMS NOT EXAMINED]  Vital Signs: (As obtained by patient/caregiver or practitioner observation)    Blood pressure-  Heart rate-    Respiratory rate- 12   Temperature-  Pulse oximetry-     Constitutional: [x] Appears well-developed and well-nourished [] No apparent distress      [] Abnormal-   Mental status  [x] Alert and awake  [x] Oriented to person/place/time [x]Able to follow commands      Eyes:  EOM    [x]  Normal  [] Abnormal-  Sclera  [x]  Normal  [] Abnormal -         Discharge []  None visible  [] Abnormal -    HENT:   [x] Normocephalic, atraumatic.   [] Abnormal   [] Mouth/Throat: Mucous membranes are moist.     External Ears [] Normal  [] Abnormal-     Neck: [x] No visualized mass     Pulmonary/Chest: [x] Respiratory effort normal. [] No visualized signs of difficulty breathing or respiratory distress        [] Abnormal-      Musculoskeletal:   [] Normal gait with no signs of ataxia         [x] Normal range of motion of neck        [] Abnormal-       Neurological:        [x] No Facial Asymmetry (Cranial nerve 7 motor function) (limited exam to video visit)          [] No gaze palsy        [] Abnormal-         Skin:        [] No significant exanthematous lesions or discoloration noted on facial skin         [] Abnormal-            Psychiatric:       [] Normal Affect [] No Hallucinations        [] Abnormal-     Other pertinent observable physical exam findings-     ASSESSMENT/PLAN:   Diagnosis Orders   1. Acute bacterial sinusitis  azithromycin (ZITHROMAX) 250 MG tablet     Outpatient Encounter Medications as of 7/13/2022   Medication Sig Dispense Refill    azithromycin (ZITHROMAX) 250 MG tablet Take 1 tablet by mouth See Admin Instructions for 5 days 500mg on day 1 followed by 250mg on days 2 - 5 6 tablet 0    gabapentin (NEURONTIN) 300 MG capsule TAKE ONE CAPSULE BY MOUTH THREE TIMES A DAY 90 capsule 0    amphetamine-dextroamphetamine (ADDERALL XR) 25 MG extended release capsule Take 1 capsule by mouth every morning for 30 days. 30 capsule 0    amphetamine-dextroamphetamine (ADDERALL, 10MG,) 10 MG tablet Take 1 tablet by mouth daily for 30 days.  30 tablet 0    QUEtiapine (SEROQUEL) 25 MG tablet Take 1-2 tablets nightly 60 tablet 3    hydrOXYzine HCl (ATARAX) 25 MG tablet Take 1 tablet by mouth 3 times daily as needed for Anxiety 90 tablet 3    naproxen (NAPROSYN) 500 MG tablet TAKE ONE TABLET BY MOUTH TWICE A DAY WITH MEALS 60 tablet 2    melatonin 3 MG TABS tablet Take 3 mg by mouth nightly as needed      cetirizine (ZYRTEC) 10 MG tablet Take 10 mg by mouth as needed       Calcium Carbonate-Vitamin D (CALTRATE 600+D PO) Take 1 tablet by mouth daily       Multiple Vitamins-Minerals (THERAPEUTIC MULTIVITAMIN-MINERALS) tablet Take 1 tablet by mouth daily      omeprazole (PRILOSEC) 20 MG capsule Take 20 mg by mouth daily as needed       senna (SENOKOT) 8.6 MG tablet Take 1 tablet by mouth daily as needed       diphenhydrAMINE (BENADRYL) 25 MG tablet Take 25 mg by mouth nightly as needed. No facility-administered encounter medications on file as of 7/13/2022. No orders of the defined types were placed in this encounter. patient can take a [address] [facility], was evaluated through a synchronous (real-time) audio-video encounter. The patient (or guardian if applicable) is aware that this is a billable service, which includes applicable co-pays. This Virtual Visit was conducted with patient's (and/or legal guardian's) consent. The visit was conducted pursuant to the emergency declaration under the [address] [place] [address], [address] [place] [address] [facility] authority and the [employer] General Act. Patient identification was verified, and a caregiver was present when appropriate. The patient was located in [place]  Provider was located at [facility] (Appt Dept): [address] [facility],  [facility]. Total time spent on this encounter: Not billed by time    --Aletha Trevizo DO on 7/13/2022 at 10:36 AM    An electronic signature was used to authenticate this note.

## 2022-08-05 ENCOUNTER — TELEPHONE (OUTPATIENT)
Dept: FAMILY MEDICINE CLINIC | Age: 62
End: 2022-08-05

## 2022-08-05 DIAGNOSIS — F90.0 ADHD (ATTENTION DEFICIT HYPERACTIVITY DISORDER), INATTENTIVE TYPE: Primary | ICD-10-CM

## 2022-08-05 RX ORDER — DEXTROAMPHETAMINE SACCHARATE, AMPHETAMINE ASPARTATE, DEXTROAMPHETAMINE SULFATE AND AMPHETAMINE SULFATE 2.5; 2.5; 2.5; 2.5 MG/1; MG/1; MG/1; MG/1
10 TABLET ORAL DAILY
Qty: 30 TABLET | Refills: 0 | Status: SHIPPED | OUTPATIENT
Start: 2022-08-07 | End: 2022-09-01 | Stop reason: SDUPTHER

## 2022-08-05 RX ORDER — DEXTROAMPHETAMINE SACCHARATE, AMPHETAMINE ASPARTATE MONOHYDRATE, DEXTROAMPHETAMINE SULFATE AND AMPHETAMINE SULFATE 6.25; 6.25; 6.25; 6.25 MG/1; MG/1; MG/1; MG/1
25 CAPSULE, EXTENDED RELEASE ORAL EVERY MORNING
Qty: 30 CAPSULE | Refills: 0 | Status: SHIPPED | OUTPATIENT
Start: 2022-08-07 | End: 2022-09-01 | Stop reason: SDUPTHER

## 2022-08-05 RX ORDER — GABAPENTIN 300 MG/1
CAPSULE ORAL
Qty: 90 CAPSULE | Refills: 0 | Status: SHIPPED | OUTPATIENT
Start: 2022-08-05 | End: 2022-09-01

## 2022-08-05 NOTE — TELEPHONE ENCOUNTER
Pt called stating the adderall you have her on is the correct dose and working well.  Pt ask for refills of her 25 MG XR and 10 MG adderall to be called into [facility] in [place]

## 2022-08-31 ENCOUNTER — TELEPHONE (OUTPATIENT)
Dept: FAMILY MEDICINE CLINIC | Age: 62
End: 2022-08-31

## 2022-08-31 NOTE — TELEPHONE ENCOUNTER
Calling for refill on adderal. Please call into [facility] in [place] on [address] .  Pt is reachable at [phone]

## 2022-09-01 DIAGNOSIS — F90.0 ADHD (ATTENTION DEFICIT HYPERACTIVITY DISORDER), INATTENTIVE TYPE: ICD-10-CM

## 2022-09-01 RX ORDER — GABAPENTIN 300 MG/1
300 CAPSULE ORAL 3 TIMES DAILY
Qty: 90 CAPSULE | Refills: 1 | Status: SHIPPED | OUTPATIENT
Start: 2022-09-01 | End: 2022-11-18 | Stop reason: SDUPTHER

## 2022-09-01 RX ORDER — DEXTROAMPHETAMINE SACCHARATE, AMPHETAMINE ASPARTATE MONOHYDRATE, DEXTROAMPHETAMINE SULFATE AND AMPHETAMINE SULFATE 6.25; 6.25; 6.25; 6.25 MG/1; MG/1; MG/1; MG/1
25 CAPSULE, EXTENDED RELEASE ORAL EVERY MORNING
Qty: 30 CAPSULE | Refills: 0 | Status: SHIPPED | OUTPATIENT
Start: 2022-09-06 | End: 2022-10-14 | Stop reason: SDUPTHER

## 2022-09-01 RX ORDER — DEXTROAMPHETAMINE SACCHARATE, AMPHETAMINE ASPARTATE, DEXTROAMPHETAMINE SULFATE AND AMPHETAMINE SULFATE 2.5; 2.5; 2.5; 2.5 MG/1; MG/1; MG/1; MG/1
10 TABLET ORAL DAILY
Qty: 30 TABLET | Refills: 0 | Status: SHIPPED | OUTPATIENT
Start: 2022-09-06 | End: 2022-10-14 | Stop reason: SDUPTHER

## 2022-09-01 NOTE — PROGRESS NOTES
Refill request for Adderall 25 mg ER capsule and 10 mg tablet. LNF 8/7/22.  Refills sent with DNF until 9/6/22

## 2022-10-14 ENCOUNTER — OFFICE VISIT (OUTPATIENT)
Dept: PSYCHIATRY | Age: 62
End: 2022-10-14
Payer: COMMERCIAL

## 2022-10-14 VITALS
BODY MASS INDEX: 26.68 KG/M2 | WEIGHT: 166 LBS | HEIGHT: 66 IN | SYSTOLIC BLOOD PRESSURE: 118 MMHG | DIASTOLIC BLOOD PRESSURE: 74 MMHG

## 2022-10-14 DIAGNOSIS — G47.9 SLEEP DIFFICULTIES: ICD-10-CM

## 2022-10-14 DIAGNOSIS — F33.2 SEVERE EPISODE OF RECURRENT MAJOR DEPRESSIVE DISORDER, WITHOUT PSYCHOTIC FEATURES (HCC): ICD-10-CM

## 2022-10-14 DIAGNOSIS — F41.1 GAD (GENERALIZED ANXIETY DISORDER): ICD-10-CM

## 2022-10-14 DIAGNOSIS — F90.0 ADHD (ATTENTION DEFICIT HYPERACTIVITY DISORDER), INATTENTIVE TYPE: ICD-10-CM

## 2022-10-14 PROCEDURE — 99213 OFFICE O/P EST LOW 20 MIN: CPT | Performed by: NURSE PRACTITIONER

## 2022-10-14 RX ORDER — QUETIAPINE FUMARATE 25 MG/1
TABLET, FILM COATED ORAL
Qty: 60 TABLET | Refills: 3 | Status: SHIPPED | OUTPATIENT
Start: 2022-10-14

## 2022-10-14 RX ORDER — DEXTROAMPHETAMINE SACCHARATE, AMPHETAMINE ASPARTATE MONOHYDRATE, DEXTROAMPHETAMINE SULFATE AND AMPHETAMINE SULFATE 6.25; 6.25; 6.25; 6.25 MG/1; MG/1; MG/1; MG/1
25 CAPSULE, EXTENDED RELEASE ORAL EVERY MORNING
Qty: 30 CAPSULE | Refills: 0 | Status: SHIPPED | OUTPATIENT
Start: 2022-10-19 | End: 2022-11-18

## 2022-10-14 RX ORDER — DEXTROAMPHETAMINE SACCHARATE, AMPHETAMINE ASPARTATE MONOHYDRATE, DEXTROAMPHETAMINE SULFATE AND AMPHETAMINE SULFATE 6.25; 6.25; 6.25; 6.25 MG/1; MG/1; MG/1; MG/1
25 CAPSULE, EXTENDED RELEASE ORAL EVERY MORNING
Qty: 30 CAPSULE | Refills: 0 | Status: SHIPPED | OUTPATIENT
Start: 2022-12-16 | End: 2023-01-15

## 2022-10-14 RX ORDER — DEXTROAMPHETAMINE SACCHARATE, AMPHETAMINE ASPARTATE MONOHYDRATE, DEXTROAMPHETAMINE SULFATE AND AMPHETAMINE SULFATE 6.25; 6.25; 6.25; 6.25 MG/1; MG/1; MG/1; MG/1
25 CAPSULE, EXTENDED RELEASE ORAL EVERY MORNING
Qty: 30 CAPSULE | Refills: 0 | Status: SHIPPED | OUTPATIENT
Start: 2022-11-17 | End: 2022-12-17

## 2022-10-14 RX ORDER — DEXTROAMPHETAMINE SACCHARATE, AMPHETAMINE ASPARTATE, DEXTROAMPHETAMINE SULFATE AND AMPHETAMINE SULFATE 2.5; 2.5; 2.5; 2.5 MG/1; MG/1; MG/1; MG/1
10 TABLET ORAL DAILY
Qty: 30 TABLET | Refills: 0 | Status: SHIPPED | OUTPATIENT
Start: 2022-11-17 | End: 2022-12-17

## 2022-10-14 RX ORDER — DEXTROAMPHETAMINE SACCHARATE, AMPHETAMINE ASPARTATE, DEXTROAMPHETAMINE SULFATE AND AMPHETAMINE SULFATE 2.5; 2.5; 2.5; 2.5 MG/1; MG/1; MG/1; MG/1
10 TABLET ORAL DAILY
Qty: 30 TABLET | Refills: 0 | Status: SHIPPED | OUTPATIENT
Start: 2022-10-19 | End: 2022-11-18

## 2022-10-14 RX ORDER — DEXTROAMPHETAMINE SACCHARATE, AMPHETAMINE ASPARTATE, DEXTROAMPHETAMINE SULFATE AND AMPHETAMINE SULFATE 2.5; 2.5; 2.5; 2.5 MG/1; MG/1; MG/1; MG/1
10 TABLET ORAL DAILY
Qty: 30 TABLET | Refills: 0 | Status: SHIPPED | OUTPATIENT
Start: 2022-12-16 | End: 2023-01-15

## 2022-10-14 ASSESSMENT — PATIENT HEALTH QUESTIONNAIRE - PHQ9
3. TROUBLE FALLING OR STAYING ASLEEP: 1
SUM OF ALL RESPONSES TO PHQ QUESTIONS 1-9: 7
2. FEELING DOWN, DEPRESSED OR HOPELESS: 0
SUM OF ALL RESPONSES TO PHQ9 QUESTIONS 1 & 2: 1
7. TROUBLE CONCENTRATING ON THINGS, SUCH AS READING THE NEWSPAPER OR WATCHING TELEVISION: 1
1. LITTLE INTEREST OR PLEASURE IN DOING THINGS: 1
9. THOUGHTS THAT YOU WOULD BE BETTER OFF DEAD, OR OF HURTING YOURSELF: 0
SUM OF ALL RESPONSES TO PHQ QUESTIONS 1-9: 7
6. FEELING BAD ABOUT YOURSELF - OR THAT YOU ARE A FAILURE OR HAVE LET YOURSELF OR YOUR FAMILY DOWN: 1
SUM OF ALL RESPONSES TO PHQ QUESTIONS 1-9: 7
5. POOR APPETITE OR OVEREATING: 1
8. MOVING OR SPEAKING SO SLOWLY THAT OTHER PEOPLE COULD HAVE NOTICED. OR THE OPPOSITE, BEING SO FIGETY OR RESTLESS THAT YOU HAVE BEEN MOVING AROUND A LOT MORE THAN USUAL: 1
SUM OF ALL RESPONSES TO PHQ QUESTIONS 1-9: 7
10. IF YOU CHECKED OFF ANY PROBLEMS, HOW DIFFICULT HAVE THESE PROBLEMS MADE IT FOR YOU TO DO YOUR WORK, TAKE CARE OF THINGS AT HOME, OR GET ALONG WITH OTHER PEOPLE: 1
4. FEELING TIRED OR HAVING LITTLE ENERGY: 1

## 2022-10-14 ASSESSMENT — ANXIETY QUESTIONNAIRES
6. BECOMING EASILY ANNOYED OR IRRITABLE: 1
2. NOT BEING ABLE TO STOP OR CONTROL WORRYING: 1
IF YOU CHECKED OFF ANY PROBLEMS ON THIS QUESTIONNAIRE, HOW DIFFICULT HAVE THESE PROBLEMS MADE IT FOR YOU TO DO YOUR WORK, TAKE CARE OF THINGS AT HOME, OR GET ALONG WITH OTHER PEOPLE: SOMEWHAT DIFFICULT
4. TROUBLE RELAXING: 1
3. WORRYING TOO MUCH ABOUT DIFFERENT THINGS: 1
7. FEELING AFRAID AS IF SOMETHING AWFUL MIGHT HAPPEN: 1
1. FEELING NERVOUS, ANXIOUS, OR ON EDGE: 1
GAD7 TOTAL SCORE: 7
5. BEING SO RESTLESS THAT IT IS HARD TO SIT STILL: 1

## 2022-10-14 NOTE — PROGRESS NOTES
PSYCHIATRY PROGRESS NOTE    Savita Robertson  1960  10/14/22      CC:   Chief Complaint   Patient presents with    Follow-up       HPI:   Savita Robertson is a 63 y.o. female with h/o Anxiety, ADHD, Depression who p/t clinic for follow up. Subjective/Interval Hx: Patient states that she has been doing really well after our last visit. Leaving today to go see her granddaughter in [place]. Very excited, happy tears about this. Misses her so much. Daughter not going. Daughter doing better. She was in [place] for two weeks but did not visit granddaughter. Patient upset by this but was able to control her thoughts. Anxiety, Depression at goal currently. No issues with Adderall. No side effects. VS reviewed. No changes. ADHD symptoms at goal.    Location:  Mind  Severity: Mild-Moderate   Context:  As above. Modifiers: Improved with meds  Quality: Anxious sometimes. ADHD improved     ASRS Scores:  ADHD screener results were positive. Inattentive:Part A - Total: 30  0-16 Unlikely  17-23 Likely  24+ Highly likely     Hyperactive/Impulsive: Part B - Total: 26  0-16 Unlikely  17-23 Likely  24+ Highly likely        Past Psychiatric History:               Prior hospitalizations: Denies              Prior diagnoses: Depression              Outpatient Treatment: See below                          Psychiatrist: Denies                          Therapist: 5 years ago. Suicide Attempts: Denies              Hx SH:  Denies     Past Psychopharmacologic Trials (including response/reactions):  Buspar- light headedness, dizzy  Zoloft- rash  Paxil- hallucinations  Nortriptyline  Xanax- rash  Valium-rash  Wellbutrin        LUIZ 7 SCORE 10/14/2022 7/8/2022 4/20/2021 3/3/2021   LUIZ-7 Total Score 7 - - -   LUIZ-7 Total Score - 18 13 13     Interpretation of LUIZ-7 score: 5-9 = mild anxiety, 10-14 = moderate anxiety,   15+ = severe anxiety. Recommend referral to behavioral health for scores 10 or greater.     PHQ-9 Total Score: 7 (10/14/2022  8:56 AM)  Thoughts that you would be better off dead, or of hurting yourself in some way: 0 (10/14/2022  8:56 AM)    Interpretation of PHQ-9 score:  1-4 = minimal depression, 5-9 = mild depression,   10-14 = moderate depression; 15-19 = moderately severe depression, 20-27 = severe depression    Past Medical/Surgical History:   Past Medical History:   Diagnosis Date    Allergic rhinitis     Anxiety disorder     Depression     HSV (herpes simplex virus) infection     IBS (irritable bowel syndrome)     SVT (supraventricular tachycardia) (HCC)     Stewart-Parkinson-White syndrome      Past Surgical History:   Procedure Laterality Date    APPENDECTOMY  07/05/1999    CATARACT REMOVAL WITH IMPLANT Left     CHOLECYSTECTOMY      COLONOSCOPY  03/2020    Dr. Byrne Him    COLONOSCOPY N/A 3/5/2020    COLORECTAL CANCER SCREENING, NOT HIGH RISK performed by Phyllis Prieto MD at [facility] 4/23/2021    EXCISION OF LARGE GANGLION CYST RIGHT FOOT AND ANKLE performed by Kaden Meza DPM at [facility]  6-8-16    with mesh    OTHER SURGICAL HISTORY      bone spur removed from front of skull    PARTIAL HYSTERECTOMY (CERVIX NOT REMOVED)  07/16/1999    TONSILLECTOMY         Family History   Problem Relation Age of Onset    Heart Disease Father     Diabetes Father     No Known Problems Mother     Kidney Disease Paternal Aunt          PCP: Geo TELLEZ,       Allergies:    Allergies   Allergen Reactions    Dye [Iodides] Anaphylaxis and Shortness Of Breath     With shellfish in hives    Shellfish-Derived Products Anaphylaxis    Shrimp (Diagnostic) Anaphylaxis    Bactrim [Sulfamethoxazole-Trimethoprim]      Blisters,skin burns    Paxil [Paroxetine] Other (See Comments)     hallucuinations    Pcn [Penicillins]      Has never taken but stays away from due to cephalexin and keflex    Amoxicillin-Pot Clavulanate Rash    Cephalexin Rash Clindamycin/Lincomycin Cross Reactors Rash    Erythromycin Rash    Nortriptyline Rash    Sertraline Rash     Skin peels    Sulfa Antibiotics Rash     Bactrim-burns skin    Tetracyclines & Related Rash         Current Medications:   Current Outpatient Medications on File Prior to Visit   Medication Sig Dispense Refill    gabapentin (NEURONTIN) 300 MG capsule Take 1 capsule by mouth 3 times daily for 60 days. TAKE ONE CAPSULE BY MOUTH THREE TIMES A DAY 90 capsule 1    hydrOXYzine HCl (ATARAX) 25 MG tablet Take 1 tablet by mouth 3 times daily as needed for Anxiety 90 tablet 3    naproxen (NAPROSYN) 500 MG tablet TAKE ONE TABLET BY MOUTH TWICE A DAY WITH MEALS 60 tablet 2    melatonin 3 MG TABS tablet Take 3 mg by mouth nightly as needed      cetirizine (ZYRTEC) 10 MG tablet Take 10 mg by mouth as needed       Calcium Carbonate-Vitamin D (CALTRATE 600+D PO) Take 1 tablet by mouth daily       Multiple Vitamins-Minerals (THERAPEUTIC MULTIVITAMIN-MINERALS) tablet Take 1 tablet by mouth daily      omeprazole (PRILOSEC) 20 MG capsule Take 20 mg by mouth daily as needed       senna (SENOKOT) 8.6 MG tablet Take 1 tablet by mouth daily as needed       diphenhydrAMINE (BENADRYL) 25 MG tablet Take 25 mg by mouth nightly as needed. No current facility-administered medications on file prior to visit.        Controlled Substance Monitoring:  10/10/2022  09/01/2022   1  Gabapentin 300 Mg Capsule 90.00  30  Pe Won  [id number]   Kro ([id number])  1   Comm Ins  [place]     09/09/2022 09/01/2022   1  Dextroamp-Amphet Er 25 Mg Cap 30.00  30  Br Gre  [id number]   Kro ([id number])  0   Comm Ins  [place]     09/09/2022 09/01/2022   1  Dextroamp-Amphetamin 10 Mg Tab 30.00  30  Br Gre  [id number]   Kro ([id number])  0   Comm Ins  [place]     09/02/2022 09/01/2022   1  Gabapentin 300 Mg Capsule 90.00  30  Pe Won  [id number]   Kro ([id number])  0   Comm Ins  [place]     08/23/2022 08/23/2022   1  Triazolam 0.25 Mg Tablet 2.00  1  Ti Kre  [id number]   Kro ([id number])  0  1.00 LME  Comm Ins  [place] 08/08/2022 08/05/2022   1  Dextroamp-Amphet Er 25 Mg Cap 30.00  30  Br Gre              OBJECTIVE:  Vitals: Wt Readings from Last 3 Encounters:   10/14/22 166 lb (75.3 kg)   10/18/21 187 lb (84.8 kg)   10/05/21 186 lb 3.2 oz (84.5 kg)       Vitals:    10/14/22 0853   BP: 118/74   Site: Right Upper Arm   Position: Sitting   Cuff Size: Large Adult   Weight: 166 lb (75.3 kg)   Height: 5' 6\" (1.676 m)     ROS: Denies trouble with fever, rash, headache, vision changes, chest pain, shortness of breath, nausea, extremity pain, weakness, dysuria. + Left foot pain     Mental Status Exam:      Appearance    alert, cooperative  Muscle strength/tone: no atrophy or abnormal movements  Gait/station: normal  Impulsive behavior Yes  Speech    spontaneous, normal rate and normal volume  Mood    Anxious, Tearful, Depressed  Affect    Anxious Congruent to thought content and mood  Thought Content    hopelessness, helplessness and excessive guilt, no delusions voiced  Thought Process    linear   Associations    logical connections  Perceptions: denies AH/VH, does not appear preoccupied with the internal environment, no delusions  Insight    Good  Judgment    Intact  Orientation    oriented to person, place, time, and general circumstances  Memory    recent and remote memory intact  Attention/Concentration    intact  Ability to understand instructions Yes  Ability to respond meaningfully Yes  Language:  [address] of Knowledge/Intelligence:  Average  SI:   no suicidal ideation  HI: Denies HI    Labs:     No visits with results within 6 Month(s) from this visit.    Latest known visit with results is:   Orders Only on 02/22/2022   Component Date Value    Amphetamine Screen, Urine 02/22/2022 Neg     Barbiturate Screen, Ur 02/22/2022 Neg     Benzodiazepine Screen, U* 02/22/2022 Neg     Cannabinoid Scrn, Ur 02/22/2022 Neg     Cocaine Metabolite Scree* 02/22/2022 Neg     Opiate Scrn, Ur 02/22/2022 Neg     PCP Screen, Urine 02/22/2022 Neg Methadone Screen, Urine 02/22/2022 Neg     Propoxyphene Scrn, Ur 02/22/2022 Neg     Oxycodone Urine 02/22/2022 Neg     pH, UA 02/22/2022 7.0     Drug Screen Comment: 02/22/2022 see below        Last Drug screen:  Lab Results   Component Value Date/Time    LABAMPH Neg 02/22/2022 09:52 AM    LABBENZ Neg 02/22/2022 09:52 AM    COCAIMETSCRU Neg 02/22/2022 09:52 AM    LABMETH Neg 02/22/2022 09:52 AM    OPIATESCREENURINE Neg 02/22/2022 09:52 AM    PHENCYCLIDINESCREENURINE Neg 02/22/2022 09:52 AM         Imaging: no head imaging on file      ASSESSMENT AND PLAN     Diagnosis Orders   1. LUIZ (generalized anxiety disorder)  QUEtiapine (SEROQUEL) 25 MG tablet      2. Severe episode of recurrent major depressive disorder, without psychotic features (HCC)  QUEtiapine (SEROQUEL) 25 MG tablet      3. Sleep difficulties  QUEtiapine (SEROQUEL) 25 MG tablet      4. ADHD (attention deficit hyperactivity disorder), inattentive type  amphetamine-dextroamphetamine (ADDERALL, 10MG,) 10 MG tablet    amphetamine-dextroamphetamine (ADDERALL XR) 25 MG extended release capsule    amphetamine-dextroamphetamine (ADDERALL XR) 25 MG extended release capsule    amphetamine-dextroamphetamine (ADDERALL, 10MG,) 10 MG tablet    amphetamine-dextroamphetamine (ADDERALL XR) 25 MG extended release capsule    amphetamine-dextroamphetamine (ADDERALL, 10MG,) 10 MG tablet          1. Safety: NO Imminent risk of danger to/self/others based on the factors considered below. Appropriate for outpatient level of care. Safety plan includes: 911, PES, hotlines, and interventions discussed today.       Risk factors: Age <25 or >55,  depressed mood, suicidal ideation, suicidal plan, access to lethal means, prior suicide attempt, family h/o completed suicide, substance abuse, chronic pain or medical illness, social isolation, history of violence, active psychosis, cognitive impairment, no outpatient services in place, medication noncompliance, and no collateral information to support safety. Protective factors:female gender, denies suicidal ideation, does not have lethal plan,no prior suicide attempts, no family h/o suicide, no substance abuse, patient has social or family support, no active psychosis or cognitive dysfunction, physically healthy,compliant with recommended medications, and patient is future oriented. 2. Psychiatric Plan     MDD, severe, without psychotic features/ULIZ: Patient struggling with severe depressive sx related to issues she has had to deal with over the years. Issues with son have heightened her depression and anxiety. April and May worse months as she is triggered.     -Continue Hydroxyzine 25 mg TID for anxiety and sleep. Side effect discussed. Beneficial to patient. - Continue Seroquel 25 mg 1-2 tablets nightly for sleep/mood issues/MDD augmentation. Will monitor metabolic side effects with this. ADHD, inattentive:     -Continue Adderall 25 mg XR once daily for ADHD symptoms. Positive family history including son, grandson. Endorses symptoms throughout her lifetime in school prior to elementary and has continued to struggle with them currently. Side effects discussed. No chance of pregnancy. No hx of cardiac events. Continue Adderall 10 mg IR once daily as needed.      -Labs: reviewed in Epic  Ordered UDS for controlled substance. Clean. Ordered CBC, CMP, vitamin d, tsh, lipid panel with Seroquel. Lipids elevated since last one done in 2017. Will check again in 6 months, may need to d/c Seroquel.      -Recommend outpt therapy. Declines at this time. -OARRS reviewed, c/w history  -R/b/se/a d/w pt who consents. 3. Medical  -Following with [address] [place] [address], DO     4. Substance   -No active issues.      5. RTC - 3 months     Taina Richter, [address], [place] Nurse Practitioner    On this date 10/14/2022 I have spent 20 minutes reviewing previous notes, test results and face to face with the patient discussing the diagnosis and importance of compliance with the treatment plan as well as documenting on the day of the visit.

## 2022-11-18 RX ORDER — GABAPENTIN 300 MG/1
300 CAPSULE ORAL 3 TIMES DAILY
Qty: 90 CAPSULE | Refills: 1 | Status: SHIPPED | OUTPATIENT
Start: 2022-11-18 | End: 2023-01-17

## 2022-12-09 ENCOUNTER — OFFICE VISIT (OUTPATIENT)
Dept: FAMILY MEDICINE CLINIC | Age: 62
End: 2022-12-09
Payer: COMMERCIAL

## 2022-12-09 VITALS
SYSTOLIC BLOOD PRESSURE: 118 MMHG | TEMPERATURE: 97.8 F | DIASTOLIC BLOOD PRESSURE: 76 MMHG | HEIGHT: 66 IN | WEIGHT: 170.8 LBS | BODY MASS INDEX: 27.45 KG/M2

## 2022-12-09 DIAGNOSIS — R53.82 CHRONIC FATIGUE: ICD-10-CM

## 2022-12-09 DIAGNOSIS — R53.82 CHRONIC FATIGUE: Primary | ICD-10-CM

## 2022-12-09 LAB
BASOPHILS ABSOLUTE: 0 K/UL (ref 0–0.2)
BASOPHILS RELATIVE PERCENT: 0.6 %
EOSINOPHILS ABSOLUTE: 0.2 K/UL (ref 0–0.6)
EOSINOPHILS RELATIVE PERCENT: 2.8 %
HCT VFR BLD CALC: 40.8 % (ref 36–48)
HEMOGLOBIN: 13.1 G/DL (ref 12–16)
LYMPHOCYTES ABSOLUTE: 2.2 K/UL (ref 1–5.1)
LYMPHOCYTES RELATIVE PERCENT: 38.2 %
MCH RBC QN AUTO: 28.9 PG (ref 26–34)
MCHC RBC AUTO-ENTMCNC: 32.2 G/DL (ref 31–36)
MCV RBC AUTO: 89.6 FL (ref 80–100)
MONOCYTES ABSOLUTE: 0.4 K/UL (ref 0–1.3)
MONOCYTES RELATIVE PERCENT: 7.3 %
NEUTROPHILS ABSOLUTE: 3 K/UL (ref 1.7–7.7)
NEUTROPHILS RELATIVE PERCENT: 51.1 %
PDW BLD-RTO: 13.6 % (ref 12.4–15.4)
PLATELET # BLD: 286 K/UL (ref 135–450)
PMV BLD AUTO: 8.6 FL (ref 5–10.5)
RBC # BLD: 4.56 M/UL (ref 4–5.2)
TSH SERPL DL<=0.05 MIU/L-ACNC: 1.36 UIU/ML (ref 0.27–4.2)
WBC # BLD: 5.8 K/UL (ref 4–11)

## 2022-12-09 PROCEDURE — 99213 OFFICE O/P EST LOW 20 MIN: CPT | Performed by: INTERNAL MEDICINE

## 2022-12-09 ASSESSMENT — ENCOUNTER SYMPTOMS
RHINORRHEA: 0
SINUS PAIN: 0
APNEA: 0
SHORTNESS OF BREATH: 0
COUGH: 0
ABDOMINAL PAIN: 0

## 2022-12-09 NOTE — PROGRESS NOTES
Eliseo Figueroa (:  1960) is a 63 y.o. female,Established patient, here for evaluation of the following chief complaint(s):  Fatigue (Patient c/o fatigue over the past couple of months)         ASSESSMENT/PLAN:   Diagnosis Orders   1. Chronic fatigue  CBC with Auto Differential    TSH         Outpatient Encounter Medications as of 2022   Medication Sig Dispense Refill    gabapentin (NEURONTIN) 300 MG capsule Take 1 capsule by mouth 3 times daily for 60 days. TAKE ONE CAPSULE BY MOUTH THREE TIMES A DAY 90 capsule 1    QUEtiapine (SEROQUEL) 25 MG tablet Take 1-2 tablets nightly 60 tablet 3    amphetamine-dextroamphetamine (ADDERALL XR) 25 MG extended release capsule Take 1 capsule by mouth every morning for 30 days. 30 capsule 0    amphetamine-dextroamphetamine (ADDERALL, 10MG,) 10 MG tablet Take 1 tablet by mouth daily for 30 days. 30 tablet 0    [START ON 2022] amphetamine-dextroamphetamine (ADDERALL, 10MG,) 10 MG tablet Take 1 tablet by mouth daily for 30 days. 30 tablet 0    hydrOXYzine HCl (ATARAX) 25 MG tablet Take 1 tablet by mouth 3 times daily as needed for Anxiety 90 tablet 3    naproxen (NAPROSYN) 500 MG tablet TAKE ONE TABLET BY MOUTH TWICE A DAY WITH MEALS 60 tablet 2    melatonin 3 MG TABS tablet Take 3 mg by mouth nightly as needed      cetirizine (ZYRTEC) 10 MG tablet Take 10 mg by mouth as needed       Calcium Carbonate-Vitamin D (CALTRATE 600+D PO) Take 1 tablet by mouth daily       Multiple Vitamins-Minerals (THERAPEUTIC MULTIVITAMIN-MINERALS) tablet Take 1 tablet by mouth daily      omeprazole (PRILOSEC) 20 MG capsule Take 20 mg by mouth daily as needed       senna (SENOKOT) 8.6 MG tablet Take 1 tablet by mouth daily as needed       diphenhydrAMINE (BENADRYL) 25 MG tablet Take 25 mg by mouth nightly as needed. amphetamine-dextroamphetamine (ADDERALL, 10MG,) 10 MG tablet Take 1 tablet by mouth daily for 30 days.  30 tablet 0    amphetamine-dextroamphetamine (ADDERALL XR) 25 MG extended release capsule Take 1 capsule by mouth every morning for 30 days. 30 capsule 0    [START ON 12/16/2022] amphetamine-dextroamphetamine (ADDERALL XR) 25 MG extended release capsule Take 1 capsule by mouth every morning for 30 days. 30 capsule 0     No facility-administered encounter medications on file as of 12/9/2022. Orders Placed This Encounter   Procedures    CBC with Auto Differential     Standing Status:   Future     Standing Expiration Date:   12/9/2023    TSH     Standing Status:   Future     Standing Expiration Date:   12/9/2023             Subjective   SUBJECTIVE/OBJECTIVE:  HPI   Chief Complaint   Patient presents with    Fatigue     Patient c/o fatigue over the past couple of months    Rosanne Gaston is a 63 y.o. female with the following history as recorded in [facility]:  Patient Active Problem List    Diagnosis Date Noted    Trigger index finger of right hand 05/07/2018    Primary osteoarthritis of first carpometacarpal joint of right hand 05/07/2018    Pain of right thumb 03/07/2018    Pink eye disease of right eye 03/07/2018    Incisional hernia, without obstruction or gangrene     Neck pain 05/23/2016    Abdominal wall pain 05/23/2016    Skull mass 02/13/2015    Skin lesion of face 12/12/2014    Skin lesion 12/12/2014    Trigeminal neuralgia 11/11/2014    Change in vision 10/14/2014    Pharyngitis 07/01/2014    Left foot pain 07/01/2014    Chills 07/01/2014    GERD (gastroesophageal reflux disease) 04/01/2014    Abdominal wall pain in epigastric region 04/01/2014    Nausea 04/01/2014    IBS (irritable bowel syndrome)     Anxiety disorder     Depression     Allergic rhinitis     HSV (herpes simplex virus) infection     SVT (supraventricular tachycardia) ([facility])      Current Outpatient Medications   Medication Sig Dispense Refill    gabapentin (NEURONTIN) 300 MG capsule Take 1 capsule by mouth 3 times daily for 60 days.  TAKE ONE CAPSULE BY MOUTH THREE TIMES A DAY 90 capsule 1 QUEtiapine (SEROQUEL) 25 MG tablet Take 1-2 tablets nightly 60 tablet 3    amphetamine-dextroamphetamine (ADDERALL XR) 25 MG extended release capsule Take 1 capsule by mouth every morning for 30 days. 30 capsule 0    amphetamine-dextroamphetamine (ADDERALL, 10MG,) 10 MG tablet Take 1 tablet by mouth daily for 30 days. 30 tablet 0    [START ON 12/16/2022] amphetamine-dextroamphetamine (ADDERALL, 10MG,) 10 MG tablet Take 1 tablet by mouth daily for 30 days. 30 tablet 0    hydrOXYzine HCl (ATARAX) 25 MG tablet Take 1 tablet by mouth 3 times daily as needed for Anxiety 90 tablet 3    naproxen (NAPROSYN) 500 MG tablet TAKE ONE TABLET BY MOUTH TWICE A DAY WITH MEALS 60 tablet 2    melatonin 3 MG TABS tablet Take 3 mg by mouth nightly as needed      cetirizine (ZYRTEC) 10 MG tablet Take 10 mg by mouth as needed       Calcium Carbonate-Vitamin D (CALTRATE 600+D PO) Take 1 tablet by mouth daily       Multiple Vitamins-Minerals (THERAPEUTIC MULTIVITAMIN-MINERALS) tablet Take 1 tablet by mouth daily      omeprazole (PRILOSEC) 20 MG capsule Take 20 mg by mouth daily as needed       senna (SENOKOT) 8.6 MG tablet Take 1 tablet by mouth daily as needed       diphenhydrAMINE (BENADRYL) 25 MG tablet Take 25 mg by mouth nightly as needed. amphetamine-dextroamphetamine (ADDERALL, 10MG,) 10 MG tablet Take 1 tablet by mouth daily for 30 days. 30 tablet 0    amphetamine-dextroamphetamine (ADDERALL XR) 25 MG extended release capsule Take 1 capsule by mouth every morning for 30 days. 30 capsule 0    [START ON 12/16/2022] amphetamine-dextroamphetamine (ADDERALL XR) 25 MG extended release capsule Take 1 capsule by mouth every morning for 30 days. 30 capsule 0     No current facility-administered medications for this visit.      Allergies: Dye [iodides], Shellfish-derived products, Shrimp (diagnostic), Bactrim [sulfamethoxazole-trimethoprim], Paxil [paroxetine], Pcn [penicillins], Amoxicillin-pot clavulanate, Cephalexin, Clindamycin/lincomycin cross reactors, Erythromycin, Nortriptyline, Sertraline, Sulfa antibiotics, and Tetracyclines & related  Past Medical History:   Diagnosis Date    Allergic rhinitis     Anxiety disorder     Depression     HSV (herpes simplex virus) infection     IBS (irritable bowel syndrome)     SVT (supraventricular tachycardia) (HCC)     Tsewart-Parkinson-White syndrome      Past Surgical History:   Procedure Laterality Date    APPENDECTOMY  1999    CATARACT REMOVAL WITH IMPLANT Left     CHOLECYSTECTOMY      COLONOSCOPY  2020    Dr. Byrne Him    COLONOSCOPY N/A 3/5/2020    COLORECTAL CANCER SCREENING, NOT HIGH RISK performed by Phyllis Prieto MD at [facility] 2021    EXCISION OF LARGE GANGLION CYST RIGHT FOOT AND ANKLE performed by Kaden Meaz DPM at [facility]  16    with mesh    OTHER SURGICAL HISTORY      bone spur removed from front of skull    PARTIAL HYSTERECTOMY (CERVIX NOT REMOVED)  1999    TONSILLECTOMY       Family History   Problem Relation Age of Onset    Heart Disease Father     Diabetes Father     No Known Problems Mother     Kidney Disease Paternal Aunt      Social History     Tobacco Use    Smoking status: Former     Types: Cigarettes     Quit date: 2008     Years since quittin.4    Smokeless tobacco: Never   Substance Use Topics    Alcohol use: Yes     Comment: social use        Review of Systems   Constitutional:  Positive for fatigue. Negative for chills and diaphoresis. HENT:  Negative for congestion, postnasal drip, rhinorrhea and sinus pain. Eyes:  Negative for visual disturbance. Respiratory:  Negative for apnea, cough and shortness of breath. Cardiovascular:  Negative for chest pain and palpitations. Gastrointestinal:  Negative for abdominal pain. Objective   Physical Exam  Vitals and nursing note reviewed.    Constitutional:       Appearance: Normal appearance. HENT:      Head: Normocephalic and atraumatic. Eyes:      Extraocular Movements: Extraocular movements intact. Pupils: Pupils are equal, round, and reactive to light. Cardiovascular:      Rate and Rhythm: Normal rate and regular rhythm. Heart sounds: No murmur heard. Pulmonary:      Effort: Pulmonary effort is normal. No respiratory distress. Breath sounds: Normal breath sounds. No wheezing or rhonchi. Abdominal:      General: There is no distension. Tenderness: There is no abdominal tenderness. Skin:     Coloration: Skin is not jaundiced. Findings: No bruising. Neurological:      General: No focal deficit present. Mental Status: She is alert and oriented to person, place, and time. Cranial Nerves: No cranial nerve deficit. Motor: No weakness. Psychiatric:         Mood and Affect: Mood normal.         Thought Content:  Thought content normal.                An electronic signature was used to authenticate this note.    --Bentley Becker, DO

## 2022-12-19 ENCOUNTER — TELEPHONE (OUTPATIENT)
Dept: FAMILY MEDICINE CLINIC | Age: 62
End: 2022-12-19

## 2022-12-20 DIAGNOSIS — F90.0 ADHD (ATTENTION DEFICIT HYPERACTIVITY DISORDER), INATTENTIVE TYPE: ICD-10-CM

## 2022-12-20 RX ORDER — DEXTROAMPHETAMINE SACCHARATE, AMPHETAMINE ASPARTATE MONOHYDRATE, DEXTROAMPHETAMINE SULFATE AND AMPHETAMINE SULFATE 6.25; 6.25; 6.25; 6.25 MG/1; MG/1; MG/1; MG/1
25 CAPSULE, EXTENDED RELEASE ORAL EVERY MORNING
Qty: 30 CAPSULE | Refills: 0 | Status: SHIPPED | OUTPATIENT
Start: 2022-12-20 | End: 2023-01-19

## 2022-12-20 RX ORDER — DEXTROAMPHETAMINE SACCHARATE, AMPHETAMINE ASPARTATE, DEXTROAMPHETAMINE SULFATE AND AMPHETAMINE SULFATE 2.5; 2.5; 2.5; 2.5 MG/1; MG/1; MG/1; MG/1
10 TABLET ORAL DAILY
Qty: 30 TABLET | Refills: 0 | Status: SHIPPED | OUTPATIENT
Start: 2022-12-20 | End: 2023-01-19

## 2022-12-20 NOTE — PROGRESS NOTES
Refill request for Adderall 25 mg XR capsule and Adderall 10 mg IR tablet. OARRS reviewed, LNF 11/22/22.  Refill sent

## 2022-12-27 ENCOUNTER — OFFICE VISIT (OUTPATIENT)
Dept: FAMILY MEDICINE CLINIC | Age: 62
End: 2022-12-27
Payer: COMMERCIAL

## 2022-12-27 VITALS
BODY MASS INDEX: 27.45 KG/M2 | DIASTOLIC BLOOD PRESSURE: 64 MMHG | SYSTOLIC BLOOD PRESSURE: 122 MMHG | WEIGHT: 170.8 LBS | TEMPERATURE: 97.8 F | HEIGHT: 66 IN

## 2022-12-27 DIAGNOSIS — S76.212A INGUINAL STRAIN, LEFT, INITIAL ENCOUNTER: Primary | ICD-10-CM

## 2022-12-27 PROCEDURE — 99213 OFFICE O/P EST LOW 20 MIN: CPT | Performed by: INTERNAL MEDICINE

## 2022-12-27 ASSESSMENT — ENCOUNTER SYMPTOMS
SHORTNESS OF BREATH: 0
APNEA: 0
ABDOMINAL DISTENTION: 0
SINUS PAIN: 0

## 2022-12-27 NOTE — PATIENT INSTRUCTIONS
[place] [facility] Please bring a current list of medications to every appointment. Please contact your pharmacy for any prescription refill(s) that you are requesting.

## 2022-12-27 NOTE — PROGRESS NOTES
Sommer Jason (:  1960) is a 63 y.o. female,Established patient, here for evaluation of the following chief complaint(s):  Hip Pain (Patient c/o left hip and groin pain. Patient states that she twisted her leg on 2022)    Sommer Jason is a 63 y.o. female with the following history as recorded in [facility]:  Patient Active Problem List    Diagnosis Date Noted    Trigger index finger of right hand 2018    Primary osteoarthritis of first carpometacarpal joint of right hand 2018    Pain of right thumb 2018    Pink eye disease of right eye 2018    Incisional hernia, without obstruction or gangrene     Neck pain 2016    Abdominal wall pain 2016    Skull mass 2015    Skin lesion of face 2014    Skin lesion 2014    Trigeminal neuralgia 2014    Change in vision 10/14/2014    Pharyngitis 2014    Left foot pain 2014    Chills 2014    GERD (gastroesophageal reflux disease) 2014    Abdominal wall pain in epigastric region 2014    Nausea 2014    IBS (irritable bowel syndrome)     Anxiety disorder     Depression     Allergic rhinitis     HSV (herpes simplex virus) infection     SVT (supraventricular tachycardia) ([facility])      Current Outpatient Medications   Medication Sig Dispense Refill    amphetamine-dextroamphetamine (ADDERALL, 10MG,) 10 MG tablet Take 1 tablet by mouth daily for 30 days. 30 tablet 0    amphetamine-dextroamphetamine (ADDERALL XR) 25 MG extended release capsule Take 1 capsule by mouth every morning for 30 days. 30 capsule 0    gabapentin (NEURONTIN) 300 MG capsule Take 1 capsule by mouth 3 times daily for 60 days.  TAKE ONE CAPSULE BY MOUTH THREE TIMES A DAY 90 capsule 1    QUEtiapine (SEROQUEL) 25 MG tablet Take 1-2 tablets nightly 60 tablet 3    hydrOXYzine HCl (ATARAX) 25 MG tablet Take 1 tablet by mouth 3 times daily as needed for Anxiety 90 tablet 3    naproxen (NAPROSYN) 500 MG tablet TAKE ONE TABLET BY MOUTH TWICE A DAY WITH MEALS 60 tablet 2    melatonin 3 MG TABS tablet Take 3 mg by mouth nightly as needed      cetirizine (ZYRTEC) 10 MG tablet Take 10 mg by mouth as needed       Calcium Carbonate-Vitamin D (CALTRATE 600+D PO) Take 1 tablet by mouth daily       Multiple Vitamins-Minerals (THERAPEUTIC MULTIVITAMIN-MINERALS) tablet Take 1 tablet by mouth daily      omeprazole (PRILOSEC) 20 MG capsule Take 20 mg by mouth daily as needed       senna (SENOKOT) 8.6 MG tablet Take 1 tablet by mouth daily as needed       diphenhydrAMINE (BENADRYL) 25 MG tablet Take 25 mg by mouth nightly as needed. No current facility-administered medications for this visit.      Allergies: Dye [iodides], Shellfish-derived products, Shrimp (diagnostic), Bactrim [sulfamethoxazole-trimethoprim], Paxil [paroxetine], Pcn [penicillins], Amoxicillin-pot clavulanate, Cephalexin, Clindamycin/lincomycin cross reactors, Erythromycin, Nortriptyline, Sertraline, Sulfa antibiotics, and Tetracyclines & related  Past Medical History:   Diagnosis Date    Allergic rhinitis     Anxiety disorder     Depression     HSV (herpes simplex virus) infection     IBS (irritable bowel syndrome)     SVT (supraventricular tachycardia) (HCC)     Stewart-Parkinson-White syndrome      Past Surgical History:   Procedure Laterality Date    APPENDECTOMY  07/05/1999    CATARACT REMOVAL WITH IMPLANT Left     CHOLECYSTECTOMY      COLONOSCOPY  03/2020    Dr. Serenity Israel    COLONOSCOPY N/A 3/5/2020    COLORECTAL CANCER SCREENING, NOT HIGH RISK performed by Marcos Galicia MD at [facility] 4/23/2021    EXCISION OF LARGE GANGLION CYST RIGHT FOOT AND ANKLE performed by Adam Buckley DPM at [address] [place] [address]  6-8-16    with mesh    OTHER SURGICAL HISTORY      bone spur removed from front of skull    PARTIAL HYSTERECTOMY (CERVIX NOT REMOVED)  07/16/1999    TONSILLECTOMY       Family History Problem Relation Age of Onset    Heart Disease Father     Diabetes Father     No Known Problems Mother     Kidney Disease Paternal Aunt      Social History     Tobacco Use    Smoking status: Former     Types: Cigarettes     Quit date: 2008     Years since quittin.5    Smokeless tobacco: Never   Substance Use Topics    Alcohol use: Yes     Comment: social use         ASSESSMENT/PLAN:   Diagnosis Orders   1. Inguinal strain, left, initial encounter         Naprosyn 500 bid      Subjective   SUBJECTIVE/OBJECTIVE:  HPI    Review of Systems   Constitutional:  Negative for chills and diaphoresis. HENT:  Negative for congestion, postnasal drip and sinus pain. Eyes:  Negative for visual disturbance. Respiratory:  Negative for apnea and shortness of breath. Cardiovascular:  Negative for chest pain. Gastrointestinal:  Negative for abdominal distention. Musculoskeletal:         Patient presents with:  Hip Pain: Patient c/o left hip and groin pain. Patient states that she twisted her leg on 2022           Objective   Physical Exam  Vitals and nursing note reviewed. Constitutional:       Appearance: Normal appearance. Cardiovascular:      Rate and Rhythm: Normal rate and regular rhythm. Pulmonary:      Effort: No respiratory distress. Breath sounds: No wheezing. Abdominal:      General: There is no distension. Musculoskeletal:         General: No swelling or deformity. Comments: Pain L groin with adduction . Neurological:      Mental Status: She is alert.                 An electronic signature was used to authenticate this note.    --Lakesha Santos,

## 2023-01-13 ENCOUNTER — OFFICE VISIT (OUTPATIENT)
Dept: PSYCHIATRY | Age: 63
End: 2023-01-13
Payer: COMMERCIAL

## 2023-01-13 VITALS
BODY MASS INDEX: 27.97 KG/M2 | DIASTOLIC BLOOD PRESSURE: 72 MMHG | SYSTOLIC BLOOD PRESSURE: 126 MMHG | HEIGHT: 66 IN | WEIGHT: 174 LBS

## 2023-01-13 DIAGNOSIS — G47.9 SLEEP DIFFICULTIES: ICD-10-CM

## 2023-01-13 DIAGNOSIS — F90.0 ADHD (ATTENTION DEFICIT HYPERACTIVITY DISORDER), INATTENTIVE TYPE: ICD-10-CM

## 2023-01-13 DIAGNOSIS — F41.1 GAD (GENERALIZED ANXIETY DISORDER): ICD-10-CM

## 2023-01-13 DIAGNOSIS — F33.2 SEVERE EPISODE OF RECURRENT MAJOR DEPRESSIVE DISORDER, WITHOUT PSYCHOTIC FEATURES (HCC): ICD-10-CM

## 2023-01-13 PROCEDURE — 99213 OFFICE O/P EST LOW 20 MIN: CPT | Performed by: NURSE PRACTITIONER

## 2023-01-13 RX ORDER — DEXTROAMPHETAMINE SACCHARATE, AMPHETAMINE ASPARTATE, DEXTROAMPHETAMINE SULFATE AND AMPHETAMINE SULFATE 2.5; 2.5; 2.5; 2.5 MG/1; MG/1; MG/1; MG/1
10 TABLET ORAL DAILY
Qty: 30 TABLET | Refills: 0 | Status: SHIPPED | OUTPATIENT
Start: 2023-01-19 | End: 2023-02-18

## 2023-01-13 RX ORDER — DEXTROAMPHETAMINE SACCHARATE, AMPHETAMINE ASPARTATE MONOHYDRATE, DEXTROAMPHETAMINE SULFATE AND AMPHETAMINE SULFATE 6.25; 6.25; 6.25; 6.25 MG/1; MG/1; MG/1; MG/1
25 CAPSULE, EXTENDED RELEASE ORAL EVERY MORNING
Qty: 30 CAPSULE | Refills: 0 | Status: SHIPPED | OUTPATIENT
Start: 2023-02-17 | End: 2023-03-19

## 2023-01-13 RX ORDER — DEXTROAMPHETAMINE SACCHARATE, AMPHETAMINE ASPARTATE, DEXTROAMPHETAMINE SULFATE AND AMPHETAMINE SULFATE 2.5; 2.5; 2.5; 2.5 MG/1; MG/1; MG/1; MG/1
10 TABLET ORAL DAILY
Qty: 30 TABLET | Refills: 0 | Status: SHIPPED | OUTPATIENT
Start: 2023-02-17 | End: 2023-03-19

## 2023-01-13 RX ORDER — DEXTROAMPHETAMINE SACCHARATE, AMPHETAMINE ASPARTATE MONOHYDRATE, DEXTROAMPHETAMINE SULFATE AND AMPHETAMINE SULFATE 6.25; 6.25; 6.25; 6.25 MG/1; MG/1; MG/1; MG/1
25 CAPSULE, EXTENDED RELEASE ORAL EVERY MORNING
Qty: 30 CAPSULE | Refills: 0 | Status: SHIPPED | OUTPATIENT
Start: 2023-01-19 | End: 2023-02-18

## 2023-01-13 RX ORDER — QUETIAPINE FUMARATE 25 MG/1
TABLET, FILM COATED ORAL
Qty: 60 TABLET | Refills: 3 | Status: SHIPPED | OUTPATIENT
Start: 2023-01-13

## 2023-01-13 RX ORDER — DEXTROAMPHETAMINE SACCHARATE, AMPHETAMINE ASPARTATE MONOHYDRATE, DEXTROAMPHETAMINE SULFATE AND AMPHETAMINE SULFATE 6.25; 6.25; 6.25; 6.25 MG/1; MG/1; MG/1; MG/1
25 CAPSULE, EXTENDED RELEASE ORAL EVERY MORNING
Qty: 30 CAPSULE | Refills: 0 | Status: SHIPPED | OUTPATIENT
Start: 2023-03-17 | End: 2023-04-16

## 2023-01-13 RX ORDER — HYDROXYZINE HYDROCHLORIDE 25 MG/1
25 TABLET, FILM COATED ORAL 3 TIMES DAILY PRN
Qty: 90 TABLET | Refills: 3 | Status: SHIPPED | OUTPATIENT
Start: 2023-01-13

## 2023-01-13 RX ORDER — GABAPENTIN 300 MG/1
300 CAPSULE ORAL 3 TIMES DAILY
Qty: 90 CAPSULE | Refills: 1 | Status: SHIPPED | OUTPATIENT
Start: 2023-01-13 | End: 2023-03-14

## 2023-01-13 RX ORDER — DEXTROAMPHETAMINE SACCHARATE, AMPHETAMINE ASPARTATE, DEXTROAMPHETAMINE SULFATE AND AMPHETAMINE SULFATE 2.5; 2.5; 2.5; 2.5 MG/1; MG/1; MG/1; MG/1
10 TABLET ORAL DAILY
Qty: 30 TABLET | Refills: 0 | Status: SHIPPED | OUTPATIENT
Start: 2023-03-17 | End: 2023-04-16

## 2023-01-13 ASSESSMENT — PATIENT HEALTH QUESTIONNAIRE - PHQ9
9. THOUGHTS THAT YOU WOULD BE BETTER OFF DEAD, OR OF HURTING YOURSELF: 0
7. TROUBLE CONCENTRATING ON THINGS, SUCH AS READING THE NEWSPAPER OR WATCHING TELEVISION: 1
SUM OF ALL RESPONSES TO PHQ9 QUESTIONS 1 & 2: 2
5. POOR APPETITE OR OVEREATING: 2
8. MOVING OR SPEAKING SO SLOWLY THAT OTHER PEOPLE COULD HAVE NOTICED. OR THE OPPOSITE, BEING SO FIGETY OR RESTLESS THAT YOU HAVE BEEN MOVING AROUND A LOT MORE THAN USUAL: 1
SUM OF ALL RESPONSES TO PHQ QUESTIONS 1-9: 10
3. TROUBLE FALLING OR STAYING ASLEEP: 2
4. FEELING TIRED OR HAVING LITTLE ENERGY: 1
SUM OF ALL RESPONSES TO PHQ QUESTIONS 1-9: 10
6. FEELING BAD ABOUT YOURSELF - OR THAT YOU ARE A FAILURE OR HAVE LET YOURSELF OR YOUR FAMILY DOWN: 1
10. IF YOU CHECKED OFF ANY PROBLEMS, HOW DIFFICULT HAVE THESE PROBLEMS MADE IT FOR YOU TO DO YOUR WORK, TAKE CARE OF THINGS AT HOME, OR GET ALONG WITH OTHER PEOPLE: 1
SUM OF ALL RESPONSES TO PHQ QUESTIONS 1-9: 10
2. FEELING DOWN, DEPRESSED OR HOPELESS: 1
1. LITTLE INTEREST OR PLEASURE IN DOING THINGS: 1
SUM OF ALL RESPONSES TO PHQ QUESTIONS 1-9: 10

## 2023-01-13 ASSESSMENT — ANXIETY QUESTIONNAIRES
5. BEING SO RESTLESS THAT IT IS HARD TO SIT STILL: 0
IF YOU CHECKED OFF ANY PROBLEMS ON THIS QUESTIONNAIRE, HOW DIFFICULT HAVE THESE PROBLEMS MADE IT FOR YOU TO DO YOUR WORK, TAKE CARE OF THINGS AT HOME, OR GET ALONG WITH OTHER PEOPLE: SOMEWHAT DIFFICULT
3. WORRYING TOO MUCH ABOUT DIFFERENT THINGS: 1
6. BECOMING EASILY ANNOYED OR IRRITABLE: 1
2. NOT BEING ABLE TO STOP OR CONTROL WORRYING: 1
GAD7 TOTAL SCORE: 5
4. TROUBLE RELAXING: 1
1. FEELING NERVOUS, ANXIOUS, OR ON EDGE: 1
7. FEELING AFRAID AS IF SOMETHING AWFUL MIGHT HAPPEN: 0
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
## 2023-01-23 RX ORDER — GABAPENTIN 300 MG/1
300 CAPSULE ORAL 3 TIMES DAILY
Qty: 90 CAPSULE | Refills: 1 | Status: SHIPPED | OUTPATIENT
Start: 2023-01-23 | End: 2023-03-24

## 2023-01-23 NOTE — TELEPHONE ENCOUNTER
We already sent this refill to [address] [place] [address] and pt never picked this med up.    She is needing this to be sent to [facility]

## 2023-03-06 ENCOUNTER — TELEPHONE (OUTPATIENT)
Dept: FAMILY MEDICINE CLINIC | Age: 63
End: 2023-03-06

## 2023-03-06 NOTE — TELEPHONE ENCOUNTER
Pt called stating her insurance company is going to be faxing something over for [facility] to fill out.

## 2023-03-20 RX ORDER — GABAPENTIN 300 MG/1
CAPSULE ORAL
Qty: 90 CAPSULE | Refills: 1 | Status: SHIPPED | OUTPATIENT
Start: 2023-03-20 | End: 2023-04-19

## 2023-05-10 RX ORDER — GABAPENTIN 300 MG/1
CAPSULE ORAL
Qty: 90 CAPSULE | Refills: 1 | Status: SHIPPED | OUTPATIENT
Start: 2023-05-10 | End: 2023-06-09

## 2023-05-16 ENCOUNTER — TELEPHONE (OUTPATIENT)
Dept: FAMILY MEDICINE CLINIC | Age: 63
End: 2023-05-16

## 2023-05-16 NOTE — TELEPHONE ENCOUNTER
[facility] pharm calling has question RE: med that was sent in.  Please call [facility] pharm back at [phone]

## 2023-06-28 RX ORDER — GABAPENTIN 300 MG/1
CAPSULE ORAL
Qty: 90 CAPSULE | Refills: 1 | Status: SHIPPED | OUTPATIENT
Start: 2023-06-28 | End: 2023-07-28

## 2023-07-07 ENCOUNTER — SCHEDULED TELEPHONE ENCOUNTER (OUTPATIENT)
Dept: PSYCHIATRY | Age: 63
End: 2023-07-07
Payer: COMMERCIAL

## 2023-07-07 DIAGNOSIS — F90.0 ADHD (ATTENTION DEFICIT HYPERACTIVITY DISORDER), INATTENTIVE TYPE: ICD-10-CM

## 2023-07-07 PROCEDURE — 99212 OFFICE O/P EST SF 10 MIN: CPT | Performed by: NURSE PRACTITIONER

## 2023-07-07 RX ORDER — DEXTROAMPHETAMINE SACCHARATE, AMPHETAMINE ASPARTATE MONOHYDRATE, DEXTROAMPHETAMINE SULFATE AND AMPHETAMINE SULFATE 6.25; 6.25; 6.25; 6.25 MG/1; MG/1; MG/1; MG/1
25 CAPSULE, EXTENDED RELEASE ORAL EVERY MORNING
Qty: 30 CAPSULE | Refills: 0 | Status: SHIPPED | OUTPATIENT
Start: 2023-07-20 | End: 2023-08-19

## 2023-07-07 RX ORDER — DEXTROAMPHETAMINE SACCHARATE, AMPHETAMINE ASPARTATE, DEXTROAMPHETAMINE SULFATE AND AMPHETAMINE SULFATE 2.5; 2.5; 2.5; 2.5 MG/1; MG/1; MG/1; MG/1
10 TABLET ORAL DAILY
Qty: 30 TABLET | Refills: 0 | Status: SHIPPED | OUTPATIENT
Start: 2023-07-20 | End: 2023-08-19

## 2023-07-07 RX ORDER — DEXTROAMPHETAMINE SACCHARATE, AMPHETAMINE ASPARTATE MONOHYDRATE, DEXTROAMPHETAMINE SULFATE AND AMPHETAMINE SULFATE 6.25; 6.25; 6.25; 6.25 MG/1; MG/1; MG/1; MG/1
25 CAPSULE, EXTENDED RELEASE ORAL EVERY MORNING
Qty: 30 CAPSULE | Refills: 0 | Status: SHIPPED | OUTPATIENT
Start: 2023-08-18 | End: 2023-09-17

## 2023-07-07 RX ORDER — DEXTROAMPHETAMINE SACCHARATE, AMPHETAMINE ASPARTATE MONOHYDRATE, DEXTROAMPHETAMINE SULFATE AND AMPHETAMINE SULFATE 6.25; 6.25; 6.25; 6.25 MG/1; MG/1; MG/1; MG/1
25 CAPSULE, EXTENDED RELEASE ORAL EVERY MORNING
Qty: 30 CAPSULE | Refills: 0 | Status: SHIPPED | OUTPATIENT
Start: 2023-09-16 | End: 2023-10-16

## 2023-07-07 RX ORDER — DEXTROAMPHETAMINE SACCHARATE, AMPHETAMINE ASPARTATE, DEXTROAMPHETAMINE SULFATE AND AMPHETAMINE SULFATE 2.5; 2.5; 2.5; 2.5 MG/1; MG/1; MG/1; MG/1
10 TABLET ORAL DAILY
Qty: 30 TABLET | Refills: 0 | Status: SHIPPED | OUTPATIENT
Start: 2023-08-18 | End: 2023-09-17

## 2023-07-07 RX ORDER — DEXTROAMPHETAMINE SACCHARATE, AMPHETAMINE ASPARTATE, DEXTROAMPHETAMINE SULFATE AND AMPHETAMINE SULFATE 2.5; 2.5; 2.5; 2.5 MG/1; MG/1; MG/1; MG/1
10 TABLET ORAL DAILY
Qty: 30 TABLET | Refills: 0 | Status: SHIPPED | OUTPATIENT
Start: 2023-09-16 | End: 2023-10-16

## 2023-07-07 ASSESSMENT — PATIENT HEALTH QUESTIONNAIRE - PHQ9
SUM OF ALL RESPONSES TO PHQ QUESTIONS 1-9: 4
SUM OF ALL RESPONSES TO PHQ QUESTIONS 1-9: 4
3. TROUBLE FALLING OR STAYING ASLEEP: 1
5. POOR APPETITE OR OVEREATING: 1
10. IF YOU CHECKED OFF ANY PROBLEMS, HOW DIFFICULT HAVE THESE PROBLEMS MADE IT FOR YOU TO DO YOUR WORK, TAKE CARE OF THINGS AT HOME, OR GET ALONG WITH OTHER PEOPLE: 1
SUM OF ALL RESPONSES TO PHQ QUESTIONS 1-9: 4
9. THOUGHTS THAT YOU WOULD BE BETTER OFF DEAD, OR OF HURTING YOURSELF: 0
6. FEELING BAD ABOUT YOURSELF - OR THAT YOU ARE A FAILURE OR HAVE LET YOURSELF OR YOUR FAMILY DOWN: 0
8. MOVING OR SPEAKING SO SLOWLY THAT OTHER PEOPLE COULD HAVE NOTICED. OR THE OPPOSITE, BEING SO FIGETY OR RESTLESS THAT YOU HAVE BEEN MOVING AROUND A LOT MORE THAN USUAL: 0
1. LITTLE INTEREST OR PLEASURE IN DOING THINGS: 1
7. TROUBLE CONCENTRATING ON THINGS, SUCH AS READING THE NEWSPAPER OR WATCHING TELEVISION: 0
SUM OF ALL RESPONSES TO PHQ QUESTIONS 1-9: 4
2. FEELING DOWN, DEPRESSED OR HOPELESS: 0
SUM OF ALL RESPONSES TO PHQ9 QUESTIONS 1 & 2: 1
4. FEELING TIRED OR HAVING LITTLE ENERGY: 1

## 2023-07-07 ASSESSMENT — ANXIETY QUESTIONNAIRES
3. WORRYING TOO MUCH ABOUT DIFFERENT THINGS: 1
IF YOU CHECKED OFF ANY PROBLEMS ON THIS QUESTIONNAIRE, HOW DIFFICULT HAVE THESE PROBLEMS MADE IT FOR YOU TO DO YOUR WORK, TAKE CARE OF THINGS AT HOME, OR GET ALONG WITH OTHER PEOPLE: SOMEWHAT DIFFICULT
1. FEELING NERVOUS, ANXIOUS, OR ON EDGE: 1
7. FEELING AFRAID AS IF SOMETHING AWFUL MIGHT HAPPEN: 1
4. TROUBLE RELAXING: 0
5. BEING SO RESTLESS THAT IT IS HARD TO SIT STILL: 1
6. BECOMING EASILY ANNOYED OR IRRITABLE: 1
2. NOT BEING ABLE TO STOP OR CONTROL WORRYING: 1
GAD7 TOTAL SCORE: 6

## 2023-07-07 NOTE — PROGRESS NOTES
Factors: Imminent risk of danger to/self/others based on the factors considered below. Appropriate for outpatient level of care. Safety plan includes: 911, PES, hotlines, and interventions discussed today. Age <25 or >49, male gender, depressed mood, suicidal ideation, suicidal plan, access to lethal means, prior suicide attempt,  substance abuse, chronic pain or medical illness, social isolation, history of violence, active psychosis, cognitive impairment, no outpatient services in place, medication noncompliance, and no collateral information to support safety. Documentation:  I communicated with the patient and/or health care decision maker about medication management and treatment plans. Details of this discussion including any medical advice provided: .bwn    Total Time: minutes: 11-20 minutes    Thony Shepherd was evaluated through a synchronous (real-time) audio encounter. Patient identification was verified at the start of the visit. She (or guardian if applicable) is aware that this is a billable service, which includes applicable co-pays. This visit was conducted with the patient's (and/or legal guardian's) verbal consent. She has not had a related appointment within my department in the past 7 days or scheduled within the next 24 hours. The patient was located at Home: [address] [place] [address]. The provider was located at Home ([facility]): [place].     Note: not billable if this call serves to triage the patient into an appointment for the relevant concern    Iris Dyer, APRN - NP

## 2023-07-24 ENCOUNTER — TELEPHONE (OUTPATIENT)
Dept: FAMILY MEDICINE CLINIC | Age: 63
End: 2023-07-24

## 2023-07-24 NOTE — TELEPHONE ENCOUNTER
Requesting refills on:     ADDERALL  20 MG     ADDERALL 10 MG    States pharm did not receive med refills after phone call visit. Please call med refills into [email or web address].     Pt is reachable at [phone]

## 2023-07-24 NOTE — TELEPHONE ENCOUNTER
Spoke with patient, informed her prescriptions were called into [facility] in [place], [place]. Patient states she used that pharm with old insurance. Will  this month from [facility] but would like all other prescriptions sent to [facility] in [place].  [place].

## 2023-07-25 DIAGNOSIS — F90.0 ADHD (ATTENTION DEFICIT HYPERACTIVITY DISORDER), INATTENTIVE TYPE: ICD-10-CM

## 2023-07-25 NOTE — TELEPHONE ENCOUNTER
Patient will need to call back for next month and following month refills as I cannot see that she filled Adderall yet and I will need to PDMP the dates

## 2023-07-25 NOTE — PROGRESS NOTES
C-Difficile admission screening and protocol:     * Admitted with diarrhea? no   *Prior history of C-Diff. In last 3 months? no     *Antibiotic use in the past 6-8 weeks? no     *Prior hospitalization or nursing home in the last month?     no Agree with above  Patient in early labor, making slow cervical change  GBS +  Will admit for augmentation with pitocin and ampicillin  Epidural PRN    Expect     anjana rosas

## 2023-08-03 DIAGNOSIS — G47.9 SLEEP DIFFICULTIES: ICD-10-CM

## 2023-08-03 DIAGNOSIS — F33.2 SEVERE EPISODE OF RECURRENT MAJOR DEPRESSIVE DISORDER, WITHOUT PSYCHOTIC FEATURES (HCC): ICD-10-CM

## 2023-08-03 DIAGNOSIS — F41.1 GAD (GENERALIZED ANXIETY DISORDER): ICD-10-CM

## 2023-08-07 RX ORDER — QUETIAPINE FUMARATE 25 MG/1
TABLET, FILM COATED ORAL
Qty: 60 TABLET | Refills: 3 | Status: SHIPPED | OUTPATIENT
Start: 2023-08-07

## 2023-08-11 ENCOUNTER — HOSPITAL ENCOUNTER (OUTPATIENT)
Dept: WOMENS IMAGING | Age: 63
Discharge: HOME | End: 2023-08-11
Payer: COMMERCIAL

## 2023-08-11 DIAGNOSIS — Z12.31 BREAST CANCER SCREENING BY MAMMOGRAM: ICD-10-CM

## 2023-08-11 PROCEDURE — 77063 BREAST TOMOSYNTHESIS BI: CPT

## 2023-08-28 ENCOUNTER — TELEPHONE (OUTPATIENT)
Dept: FAMILY MEDICINE CLINIC | Age: 63
End: 2023-08-28

## 2023-08-28 DIAGNOSIS — F90.0 ADHD (ATTENTION DEFICIT HYPERACTIVITY DISORDER), INATTENTIVE TYPE: ICD-10-CM

## 2023-08-28 RX ORDER — DEXTROAMPHETAMINE SACCHARATE, AMPHETAMINE ASPARTATE MONOHYDRATE, DEXTROAMPHETAMINE SULFATE AND AMPHETAMINE SULFATE 6.25; 6.25; 6.25; 6.25 MG/1; MG/1; MG/1; MG/1
25 CAPSULE, EXTENDED RELEASE ORAL EVERY MORNING
Qty: 30 CAPSULE | Refills: 0 | Status: SHIPPED | OUTPATIENT
Start: 2023-08-28 | End: 2023-09-27

## 2023-08-28 NOTE — TELEPHONE ENCOUNTER
Pt called stating that she wanted her medication to be sent over to [facility] instead of [facility] because they were charging her a large amount. Pt wanted her   amphetamine-dextroamphetamine (ADDERALL XR) 25 MG extended release capsule to be canceled at [facility] and to be sent over to [facility].

## 2023-08-28 NOTE — TELEPHONE ENCOUNTER
Looks like she had her 10 mg Adderall IR filled on 8/21.  I will send her XR to [facility] and then at her next visit in Sept we will send the rest

## 2023-08-29 SDOH — ECONOMIC STABILITY: FOOD INSECURITY: WITHIN THE PAST 12 MONTHS, THE FOOD YOU BOUGHT JUST DIDN'T LAST AND YOU DIDN'T HAVE MONEY TO GET MORE.: NEVER TRUE

## 2023-08-29 SDOH — ECONOMIC STABILITY: FOOD INSECURITY: WITHIN THE PAST 12 MONTHS, YOU WORRIED THAT YOUR FOOD WOULD RUN OUT BEFORE YOU GOT MONEY TO BUY MORE.: NEVER TRUE

## 2023-08-29 SDOH — ECONOMIC STABILITY: HOUSING INSECURITY
IN THE LAST 12 MONTHS, WAS THERE A TIME WHEN YOU DID NOT HAVE A STEADY PLACE TO SLEEP OR SLEPT IN A SHELTER (INCLUDING NOW)?: NO

## 2023-08-29 SDOH — ECONOMIC STABILITY: INCOME INSECURITY: HOW HARD IS IT FOR YOU TO PAY FOR THE VERY BASICS LIKE FOOD, HOUSING, MEDICAL CARE, AND HEATING?: NOT HARD AT ALL

## 2023-08-30 ENCOUNTER — HOSPITAL ENCOUNTER (OUTPATIENT)
Age: 63
Discharge: HOME | End: 2023-08-30
Payer: COMMERCIAL

## 2023-08-30 ENCOUNTER — HOSPITAL ENCOUNTER (OUTPATIENT)
Dept: GENERAL RADIOLOGY | Age: 63
Discharge: HOME | End: 2023-08-30
Payer: COMMERCIAL

## 2023-08-30 ENCOUNTER — OFFICE VISIT (OUTPATIENT)
Dept: FAMILY MEDICINE CLINIC | Age: 63
End: 2023-08-30
Payer: COMMERCIAL

## 2023-08-30 VITALS
WEIGHT: 170.2 LBS | SYSTOLIC BLOOD PRESSURE: 118 MMHG | DIASTOLIC BLOOD PRESSURE: 64 MMHG | TEMPERATURE: 97.5 F | HEIGHT: 66 IN | BODY MASS INDEX: 27.35 KG/M2

## 2023-08-30 DIAGNOSIS — M25.551 PAIN OF RIGHT HIP: ICD-10-CM

## 2023-08-30 DIAGNOSIS — B96.89 ACUTE BACTERIAL SINUSITIS: ICD-10-CM

## 2023-08-30 DIAGNOSIS — J01.90 ACUTE BACTERIAL SINUSITIS: ICD-10-CM

## 2023-08-30 DIAGNOSIS — M25.551 PAIN OF RIGHT HIP: Primary | ICD-10-CM

## 2023-08-30 PROCEDURE — 73502 X-RAY EXAM HIP UNI 2-3 VIEWS: CPT

## 2023-08-30 PROCEDURE — 99213 OFFICE O/P EST LOW 20 MIN: CPT | Performed by: INTERNAL MEDICINE

## 2023-08-30 RX ORDER — AZITHROMYCIN 250 MG/1
250 TABLET, FILM COATED ORAL SEE ADMIN INSTRUCTIONS
Qty: 6 TABLET | Refills: 0 | Status: SHIPPED | OUTPATIENT
Start: 2023-08-30 | End: 2023-09-04

## 2023-08-30 ASSESSMENT — ENCOUNTER SYMPTOMS
RHINORRHEA: 0
COUGH: 0
WHEEZING: 0
APNEA: 0
ABDOMINAL PAIN: 0
SHORTNESS OF BREATH: 0

## 2023-08-30 NOTE — PROGRESS NOTES
Heart Disease Father     Diabetes Father     No Known Problems Mother     Kidney Disease Paternal Aunt      Social History     Tobacco Use    Smoking status: Former     Types: Cigarettes     Quit date: 6/20/2008     Years since quitting: 15.2    Smokeless tobacco: Never   Substance Use Topics    Alcohol use: Yes     Comment: social use           ASSESSMENT/PLAN:   Diagnosis Orders   1. Pain of right hip  XR HIP RIGHT (2-3 VIEWS)      2. Acute bacterial sinusitis  azithromycin (ZITHROMAX) 250 MG tablet         Outpatient Encounter Medications as of 8/30/2023   Medication Sig Dispense Refill    Phenylephrine-guaiFENesin (GENEXA LA PO) Take by mouth daily      azithromycin (ZITHROMAX) 250 MG tablet Take 1 tablet by mouth See Admin Instructions for 5 days 500mg on day 1 followed by 250mg on days 2 - 5 6 tablet 0    amphetamine-dextroamphetamine (ADDERALL XR) 25 MG extended release capsule Take 1 capsule by mouth every morning for 30 days.  Max Daily Amount: 25 mg 30 capsule 0    QUEtiapine (SEROQUEL) 25 MG tablet TAKE 1 TO 2 TABLETS BY MOUTH EVERY NIGHT 60 tablet 3    gabapentin (NEURONTIN) 300 MG capsule TAKE 1 CAPSULE BY MOUTH THREE TIMES DAILY 90 capsule 1    hydrOXYzine HCl (ATARAX) 25 MG tablet Take 1 tablet by mouth 3 times daily as needed for Anxiety 90 tablet 3    melatonin 3 MG TABS tablet Take 1 tablet by mouth nightly as needed      Calcium Carbonate-Vitamin D (CALTRATE 600+D PO) Take 1 tablet by mouth daily       Multiple Vitamins-Minerals (THERAPEUTIC MULTIVITAMIN-MINERALS) tablet Take 1 tablet by mouth daily      omeprazole (PRILOSEC) 20 MG capsule Take 1 capsule by mouth daily as needed      senna (SENOKOT) 8.6 MG tablet Take 1 tablet by mouth daily as needed      naproxen (NAPROSYN) 500 MG tablet TAKE ONE TABLET BY MOUTH TWICE A DAY WITH MEALS (Patient not taking: Reported on 8/30/2023) 60 tablet 2    [DISCONTINUED] cetirizine (ZYRTEC) 10 MG tablet Take 1 tablet by mouth as needed      [DISCONTINUED]

## 2023-09-06 RX ORDER — ALENDRONATE SODIUM 70 MG/1
70 TABLET ORAL
Qty: 12 TABLET | Refills: 1 | Status: SHIPPED | OUTPATIENT
Start: 2023-09-06

## 2023-09-08 ENCOUNTER — OFFICE VISIT (OUTPATIENT)
Dept: PSYCHIATRY | Age: 63
End: 2023-09-08
Payer: COMMERCIAL

## 2023-09-08 VITALS
SYSTOLIC BLOOD PRESSURE: 126 MMHG | DIASTOLIC BLOOD PRESSURE: 72 MMHG | HEIGHT: 66 IN | BODY MASS INDEX: 27.64 KG/M2 | WEIGHT: 172 LBS

## 2023-09-08 DIAGNOSIS — F41.1 GAD (GENERALIZED ANXIETY DISORDER): ICD-10-CM

## 2023-09-08 DIAGNOSIS — F33.2 SEVERE EPISODE OF RECURRENT MAJOR DEPRESSIVE DISORDER, WITHOUT PSYCHOTIC FEATURES (HCC): ICD-10-CM

## 2023-09-08 DIAGNOSIS — F90.0 ADHD (ATTENTION DEFICIT HYPERACTIVITY DISORDER), INATTENTIVE TYPE: Primary | ICD-10-CM

## 2023-09-08 LAB
ALBUMIN SERPL-MCNC: 4.5 G/DL (ref 3.4–5)
ALBUMIN/GLOB SERPL: 2 {RATIO} (ref 1.1–2.2)
ALP SERPL-CCNC: 66 U/L (ref 40–129)
ALT SERPL-CCNC: 16 U/L (ref 10–40)
AMPHETAMINES UR QL SCN>1000 NG/ML: NORMAL
ANION GAP SERPL CALCULATED.3IONS-SCNC: 9 MMOL/L (ref 3–16)
AST SERPL-CCNC: 22 U/L (ref 15–37)
BARBITURATES UR QL SCN>200 NG/ML: NORMAL
BENZODIAZ UR QL SCN>200 NG/ML: NORMAL
BILIRUB SERPL-MCNC: 0.3 MG/DL (ref 0–1)
BUN SERPL-MCNC: 14 MG/DL (ref 7–20)
BUPRENORPHINE+NOR UR QL SCN: NORMAL
CALCIUM SERPL-MCNC: 9.4 MG/DL (ref 8.3–10.6)
CANNABINOIDS UR QL SCN>50 NG/ML: NORMAL
CHLORIDE SERPL-SCNC: 104 MMOL/L (ref 99–110)
CHOLEST SERPL-MCNC: 257 MG/DL (ref 0–199)
CO2 SERPL-SCNC: 28 MMOL/L (ref 21–32)
COCAINE UR QL SCN: NORMAL
CREAT SERPL-MCNC: 0.6 MG/DL (ref 0.6–1.2)
DEPRECATED RDW RBC AUTO: 13.4 % (ref 12.4–15.4)
DRUG SCREEN COMMENT UR-IMP: NORMAL
FENTANYL SCREEN, URINE: NORMAL
GFR SERPLBLD CREATININE-BSD FMLA CKD-EPI: >60 ML/MIN/{1.73_M2}
GLUCOSE SERPL-MCNC: 92 MG/DL (ref 70–99)
HCT VFR BLD AUTO: 39.2 % (ref 36–48)
HDLC SERPL-MCNC: 76 MG/DL (ref 40–60)
HGB BLD-MCNC: 13.1 G/DL (ref 12–16)
LDLC SERPL CALC-MCNC: 165 MG/DL
MCH RBC QN AUTO: 29.6 PG (ref 26–34)
MCHC RBC AUTO-ENTMCNC: 33.5 G/DL (ref 31–36)
MCV RBC AUTO: 88.4 FL (ref 80–100)
METHADONE UR QL SCN>300 NG/ML: NORMAL
OPIATES UR QL SCN>300 NG/ML: NORMAL
OXYCODONE UR QL SCN: NORMAL
PCP UR QL SCN>25 NG/ML: NORMAL
PH UR STRIP: 7 [PH]
PLATELET # BLD AUTO: 314 K/UL (ref 135–450)
PMV BLD AUTO: 8.3 FL (ref 5–10.5)
POTASSIUM SERPL-SCNC: 4.4 MMOL/L (ref 3.5–5.1)
PROT SERPL-MCNC: 6.7 G/DL (ref 6.4–8.2)
RBC # BLD AUTO: 4.43 M/UL (ref 4–5.2)
SODIUM SERPL-SCNC: 141 MMOL/L (ref 136–145)
TRIGL SERPL-MCNC: 80 MG/DL (ref 0–150)
TSH SERPL DL<=0.005 MIU/L-ACNC: 1.34 UIU/ML (ref 0.27–4.2)
VLDLC SERPL CALC-MCNC: 16 MG/DL
WBC # BLD AUTO: 6.9 K/UL (ref 4–11)

## 2023-09-08 PROCEDURE — 99213 OFFICE O/P EST LOW 20 MIN: CPT | Performed by: NURSE PRACTITIONER

## 2023-09-08 RX ORDER — DEXTROAMPHETAMINE SACCHARATE, AMPHETAMINE ASPARTATE MONOHYDRATE, DEXTROAMPHETAMINE SULFATE AND AMPHETAMINE SULFATE 6.25; 6.25; 6.25; 6.25 MG/1; MG/1; MG/1; MG/1
25 CAPSULE, EXTENDED RELEASE ORAL DAILY
Qty: 30 CAPSULE | Refills: 0 | Status: SHIPPED | OUTPATIENT
Start: 2023-10-28 | End: 2023-11-27

## 2023-09-08 RX ORDER — DEXTROAMPHETAMINE SACCHARATE, AMPHETAMINE ASPARTATE, DEXTROAMPHETAMINE SULFATE AND AMPHETAMINE SULFATE 2.5; 2.5; 2.5; 2.5 MG/1; MG/1; MG/1; MG/1
10 TABLET ORAL DAILY
Qty: 30 TABLET | Refills: 0 | Status: SHIPPED | OUTPATIENT
Start: 2023-09-20 | End: 2023-10-20

## 2023-09-08 RX ORDER — DEXTROAMPHETAMINE SACCHARATE, AMPHETAMINE ASPARTATE, DEXTROAMPHETAMINE SULFATE AND AMPHETAMINE SULFATE 2.5; 2.5; 2.5; 2.5 MG/1; MG/1; MG/1; MG/1
10 TABLET ORAL DAILY
Qty: 30 TABLET | Refills: 0 | Status: SHIPPED | OUTPATIENT
Start: 2023-10-18 | End: 2023-11-17

## 2023-09-08 RX ORDER — DEXTROAMPHETAMINE SACCHARATE, AMPHETAMINE ASPARTATE MONOHYDRATE, DEXTROAMPHETAMINE SULFATE AND AMPHETAMINE SULFATE 6.25; 6.25; 6.25; 6.25 MG/1; MG/1; MG/1; MG/1
25 CAPSULE, EXTENDED RELEASE ORAL DAILY
Qty: 30 CAPSULE | Refills: 0 | Status: SHIPPED | OUTPATIENT
Start: 2023-09-29 | End: 2023-10-29

## 2023-09-08 RX ORDER — DEXTROAMPHETAMINE SACCHARATE, AMPHETAMINE ASPARTATE, DEXTROAMPHETAMINE SULFATE AND AMPHETAMINE SULFATE 2.5; 2.5; 2.5; 2.5 MG/1; MG/1; MG/1; MG/1
10 TABLET ORAL DAILY
Qty: 30 TABLET | Refills: 0 | Status: SHIPPED | OUTPATIENT
Start: 2023-11-16 | End: 2023-12-16

## 2023-09-08 RX ORDER — DEXTROAMPHETAMINE SACCHARATE, AMPHETAMINE ASPARTATE MONOHYDRATE, DEXTROAMPHETAMINE SULFATE AND AMPHETAMINE SULFATE 6.25; 6.25; 6.25; 6.25 MG/1; MG/1; MG/1; MG/1
25 CAPSULE, EXTENDED RELEASE ORAL DAILY
Qty: 30 CAPSULE | Refills: 0 | Status: SHIPPED | OUTPATIENT
Start: 2023-11-26 | End: 2023-12-26

## 2023-09-08 ASSESSMENT — PATIENT HEALTH QUESTIONNAIRE - PHQ9
4. FEELING TIRED OR HAVING LITTLE ENERGY: 1
2. FEELING DOWN, DEPRESSED OR HOPELESS: 1
7. TROUBLE CONCENTRATING ON THINGS, SUCH AS READING THE NEWSPAPER OR WATCHING TELEVISION: 1
6. FEELING BAD ABOUT YOURSELF - OR THAT YOU ARE A FAILURE OR HAVE LET YOURSELF OR YOUR FAMILY DOWN: 0
3. TROUBLE FALLING OR STAYING ASLEEP: 1
SUM OF ALL RESPONSES TO PHQ QUESTIONS 1-9: 7
1. LITTLE INTEREST OR PLEASURE IN DOING THINGS: 1
SUM OF ALL RESPONSES TO PHQ QUESTIONS 1-9: 7
SUM OF ALL RESPONSES TO PHQ QUESTIONS 1-9: 7
SUM OF ALL RESPONSES TO PHQ9 QUESTIONS 1 & 2: 2
9. THOUGHTS THAT YOU WOULD BE BETTER OFF DEAD, OR OF HURTING YOURSELF: 0
SUM OF ALL RESPONSES TO PHQ QUESTIONS 1-9: 7
5. POOR APPETITE OR OVEREATING: 1
8. MOVING OR SPEAKING SO SLOWLY THAT OTHER PEOPLE COULD HAVE NOTICED. OR THE OPPOSITE, BEING SO FIGETY OR RESTLESS THAT YOU HAVE BEEN MOVING AROUND A LOT MORE THAN USUAL: 1
10. IF YOU CHECKED OFF ANY PROBLEMS, HOW DIFFICULT HAVE THESE PROBLEMS MADE IT FOR YOU TO DO YOUR WORK, TAKE CARE OF THINGS AT HOME, OR GET ALONG WITH OTHER PEOPLE: 1

## 2023-09-08 ASSESSMENT — ANXIETY QUESTIONNAIRES
7. FEELING AFRAID AS IF SOMETHING AWFUL MIGHT HAPPEN: 0
3. WORRYING TOO MUCH ABOUT DIFFERENT THINGS: 0
1. FEELING NERVOUS, ANXIOUS, OR ON EDGE: 1
4. TROUBLE RELAXING: 0
6. BECOMING EASILY ANNOYED OR IRRITABLE: 0
IF YOU CHECKED OFF ANY PROBLEMS ON THIS QUESTIONNAIRE, HOW DIFFICULT HAVE THESE PROBLEMS MADE IT FOR YOU TO DO YOUR WORK, TAKE CARE OF THINGS AT HOME, OR GET ALONG WITH OTHER PEOPLE: SOMEWHAT DIFFICULT
2. NOT BEING ABLE TO STOP OR CONTROL WORRYING: 1
GAD7 TOTAL SCORE: 2
5. BEING SO RESTLESS THAT IT IS HARD TO SIT STILL: 0

## 2023-09-08 NOTE — PROGRESS NOTES
PSYCHIATRY PROGRESS NOTE    Ellen Celis  1960 09/08/23    CC:   Chief Complaint   Patient presents with    Follow-up     Referred by Keyonna Ty DO. Diagnosis Orders   1. ADHD (attention deficit hyperactivity disorder), inattentive type  amphetamine-dextroamphetamine (ADDERALL XR) 25 MG extended release capsule    amphetamine-dextroamphetamine (ADDERALL XR) 25 MG extended release capsule    amphetamine-dextroamphetamine (ADDERALL XR) 25 MG extended release capsule    amphetamine-dextroamphetamine (ADDERALL, 10MG,) 10 MG tablet    amphetamine-dextroamphetamine (ADDERALL, 10MG,) 10 MG tablet    amphetamine-dextroamphetamine (ADDERALL, 10MG,) 10 MG tablet            Assessment & Plan:     Psychiatric   63 y.o.a. F with hx of depression and anxiety. Depression stabilized currently along with anxiety and ADHD symptoms. We will continue her current medication regiment. Continue to recommend therapy as needed. Hikes and exercises weekly as well. - Continue Seroquel 25 mg 1-2 tablets qhs  - Continue Hydroxyzine 25 mg TID PRN  - Continue Adderall 25 mg XR    2. Psychotherapy  - Denies. Patient not participating in therapy at this time. Recommended. - Practice complementary health approaches such as: self-management strategies, relaxation techniques, yoga, and physical exercise as tolerated. 3.   Substance Abuse  - Denies  - Completing UDS today  -Med contract signed 4/23  - OARRS reviewed, c/w medication list     4. Medical   - PCP Biju Sharma     5. RTC:  3 months or earlier if your symptoms fail to improve or go to nearest ER if having active SI/HI. Evaluated medications and assessed for side effects and effectiveness. Assessed patient's educational needs including reviewing plan of care, medications,diagnosis, treatment options, and prognosis. I reviewed the plan of care with the patient and the patient consented to the treatment interventions.  Patient acknowledged, verbalized understanding,

## 2023-09-09 LAB
EST. AVERAGE GLUCOSE BLD GHB EST-MCNC: 122.6 MG/DL
HBA1C MFR BLD: 5.9 %

## 2023-09-25 RX ORDER — GABAPENTIN 300 MG/1
CAPSULE ORAL
Qty: 90 CAPSULE | Refills: 1 | Status: SHIPPED | OUTPATIENT
Start: 2023-09-25 | End: 2023-10-25

## 2023-10-27 RX ORDER — GABAPENTIN 300 MG/1
CAPSULE ORAL
Qty: 90 CAPSULE | Refills: 1 | Status: SHIPPED | OUTPATIENT
Start: 2023-10-27 | End: 2023-11-26

## 2023-11-20 ENCOUNTER — TELEPHONE (OUTPATIENT)
Dept: FAMILY MEDICINE CLINIC | Age: 63
End: 2023-11-20

## 2023-11-20 NOTE — TELEPHONE ENCOUNTER
Pt called stating her pharmacy is out of 10 MG adderall and the pharmacy stated Pt needs a new script for 5MG and double it. Pt states she believes the pharmacy is going to request this but she wanted to make sure.  Pt uses [facility] in [address]

## 2023-11-21 DIAGNOSIS — F90.0 ADHD (ATTENTION DEFICIT HYPERACTIVITY DISORDER), INATTENTIVE TYPE: Primary | ICD-10-CM

## 2023-11-21 RX ORDER — DEXTROAMPHETAMINE SACCHARATE, AMPHETAMINE ASPARTATE MONOHYDRATE, DEXTROAMPHETAMINE SULFATE AND AMPHETAMINE SULFATE 5; 5; 5; 5 MG/1; MG/1; MG/1; MG/1
20 CAPSULE, EXTENDED RELEASE ORAL EVERY MORNING
Qty: 30 CAPSULE | Refills: 0 | Status: SHIPPED | OUTPATIENT
Start: 2023-11-21 | End: 2023-12-21

## 2023-11-21 RX ORDER — DEXTROAMPHETAMINE SACCHARATE, AMPHETAMINE ASPARTATE MONOHYDRATE, DEXTROAMPHETAMINE SULFATE AND AMPHETAMINE SULFATE 1.25; 1.25; 1.25; 1.25 MG/1; MG/1; MG/1; MG/1
5 CAPSULE, EXTENDED RELEASE ORAL DAILY
Qty: 30 CAPSULE | Refills: 0 | Status: SHIPPED | OUTPATIENT
Start: 2023-11-21 | End: 2023-12-21

## 2023-12-04 ENCOUNTER — OFFICE VISIT (OUTPATIENT)
Dept: FAMILY MEDICINE CLINIC | Age: 63
End: 2023-12-04
Payer: COMMERCIAL

## 2023-12-04 VITALS
TEMPERATURE: 97.3 F | HEIGHT: 66 IN | SYSTOLIC BLOOD PRESSURE: 120 MMHG | BODY MASS INDEX: 29.09 KG/M2 | DIASTOLIC BLOOD PRESSURE: 76 MMHG | WEIGHT: 181 LBS

## 2023-12-04 DIAGNOSIS — B96.89 ACUTE BACTERIAL SINUSITIS: Primary | ICD-10-CM

## 2023-12-04 DIAGNOSIS — J01.90 ACUTE BACTERIAL SINUSITIS: Primary | ICD-10-CM

## 2023-12-04 PROCEDURE — 99213 OFFICE O/P EST LOW 20 MIN: CPT | Performed by: INTERNAL MEDICINE

## 2023-12-04 RX ORDER — GABAPENTIN 300 MG/1
300 CAPSULE ORAL 3 TIMES DAILY
Qty: 90 CAPSULE | Refills: 0 | Status: SHIPPED | OUTPATIENT
Start: 2023-12-04 | End: 2024-01-03

## 2023-12-04 RX ORDER — AZITHROMYCIN 250 MG/1
250 TABLET, FILM COATED ORAL SEE ADMIN INSTRUCTIONS
Qty: 6 TABLET | Refills: 0 | Status: SHIPPED | OUTPATIENT
Start: 2023-12-04 | End: 2023-12-09

## 2023-12-04 ASSESSMENT — ENCOUNTER SYMPTOMS
ABDOMINAL PAIN: 0
WHEEZING: 0
RHINORRHEA: 1
SINUS PRESSURE: 1
SINUS PAIN: 1
COUGH: 1

## 2023-12-04 NOTE — PROGRESS NOTES
Ellen Celis (:  1960) is a 66 y.o. female,Established patient, here for evaluation of the following chief complaint(s):  Sinusitis (Patient c/o headache, left ear pain, sore throat, cough, head and chest congestion, lightheadedness since 2023. Patient tested negative for Covid-19 on 2023. Patient has taken OTC Mucinex and Advil Sinus)  Ellen Celis is a 66 y.o. female with the following history as recorded in [facility]:  Patient Active Problem List    Diagnosis Date Noted    Trigger index finger of right hand 2018    Primary osteoarthritis of first carpometacarpal joint of right hand 2018    Pain of right thumb 2018    Pink eye disease of right eye 2018    Incisional hernia, without obstruction or gangrene     Neck pain 2016    Abdominal wall pain 2016    Skull mass 2015    Skin lesion of face 2014    Skin lesion 2014    Trigeminal neuralgia 2014    Change in vision 10/14/2014    Pharyngitis 2014    Left foot pain 2014    Chills 2014    GERD (gastroesophageal reflux disease) 2014    Abdominal wall pain in epigastric region 2014    Nausea 2014    IBS (irritable bowel syndrome)     Anxiety disorder     Depression     Allergic rhinitis     HSV (herpes simplex virus) infection     SVT (supraventricular tachycardia)      Current Outpatient Medications   Medication Sig Dispense Refill    gabapentin (NEURONTIN) 300 MG capsule TAKE 1 CAPSULE BY MOUTH THREE TIMES DAILY 90 capsule 1    amphetamine-dextroamphetamine (ADDERALL XR) 25 MG extended release capsule Take 1 capsule by mouth daily for 30 days. Max Daily Amount: 25 mg 30 capsule 0    amphetamine-dextroamphetamine (ADDERALL, 10MG,) 10 MG tablet Take 1 tablet by mouth daily for 30 days.  Max Daily Amount: 10 mg (Patient taking differently: Take 0.5 tablets by mouth daily.) 30 tablet 0    alendronate (FOSAMAX) 70 MG tablet Take 1 tablet by mouth

## 2023-12-06 ENCOUNTER — TELEPHONE (OUTPATIENT)
Dept: FAMILY MEDICINE CLINIC | Age: 63
End: 2023-12-06

## 2023-12-06 NOTE — TELEPHONE ENCOUNTER
Patient was just seen on 12/4/23 , her left ear is worse, she has pain going down her neck on the left side, has a clear drainage. Her  said there is a red line down her left ear. She is not sure what to do.       Please call, [phone]

## 2023-12-08 ENCOUNTER — OFFICE VISIT (OUTPATIENT)
Dept: PSYCHIATRY | Age: 63
End: 2023-12-08
Payer: COMMERCIAL

## 2023-12-08 VITALS
HEIGHT: 66 IN | DIASTOLIC BLOOD PRESSURE: 78 MMHG | BODY MASS INDEX: 29.73 KG/M2 | SYSTOLIC BLOOD PRESSURE: 122 MMHG | WEIGHT: 185 LBS

## 2023-12-08 DIAGNOSIS — F33.1 MDD (MAJOR DEPRESSIVE DISORDER), RECURRENT EPISODE, MODERATE (HCC): ICD-10-CM

## 2023-12-08 DIAGNOSIS — F90.0 ADHD (ATTENTION DEFICIT HYPERACTIVITY DISORDER), INATTENTIVE TYPE: Primary | ICD-10-CM

## 2023-12-08 DIAGNOSIS — F41.1 GAD (GENERALIZED ANXIETY DISORDER): ICD-10-CM

## 2023-12-08 DIAGNOSIS — F33.2 SEVERE EPISODE OF RECURRENT MAJOR DEPRESSIVE DISORDER, WITHOUT PSYCHOTIC FEATURES (HCC): ICD-10-CM

## 2023-12-08 DIAGNOSIS — G47.9 SLEEP DIFFICULTIES: ICD-10-CM

## 2023-12-08 PROCEDURE — 99213 OFFICE O/P EST LOW 20 MIN: CPT | Performed by: NURSE PRACTITIONER

## 2023-12-08 RX ORDER — DEXTROAMPHETAMINE SACCHARATE, AMPHETAMINE ASPARTATE, DEXTROAMPHETAMINE SULFATE AND AMPHETAMINE SULFATE 2.5; 2.5; 2.5; 2.5 MG/1; MG/1; MG/1; MG/1
10 TABLET ORAL DAILY
Qty: 30 TABLET | Refills: 0 | Status: SHIPPED | OUTPATIENT
Start: 2024-01-17 | End: 2024-02-16

## 2023-12-08 RX ORDER — HYDROXYZINE HYDROCHLORIDE 10 MG/1
10 TABLET, FILM COATED ORAL 3 TIMES DAILY PRN
Qty: 90 TABLET | Refills: 0 | Status: SHIPPED | OUTPATIENT
Start: 2023-12-08 | End: 2024-01-07

## 2023-12-08 RX ORDER — DEXTROAMPHETAMINE SACCHARATE, AMPHETAMINE ASPARTATE MONOHYDRATE, DEXTROAMPHETAMINE SULFATE AND AMPHETAMINE SULFATE 6.25; 6.25; 6.25; 6.25 MG/1; MG/1; MG/1; MG/1
25 CAPSULE, EXTENDED RELEASE ORAL DAILY
Qty: 30 CAPSULE | Refills: 0 | Status: SHIPPED | OUTPATIENT
Start: 2024-01-07 | End: 2024-02-06

## 2023-12-08 RX ORDER — QUETIAPINE FUMARATE 25 MG/1
TABLET, FILM COATED ORAL
Qty: 60 TABLET | Refills: 3 | Status: SHIPPED | OUTPATIENT
Start: 2023-12-08

## 2023-12-08 RX ORDER — DEXTROAMPHETAMINE SACCHARATE, AMPHETAMINE ASPARTATE, DEXTROAMPHETAMINE SULFATE AND AMPHETAMINE SULFATE 2.5; 2.5; 2.5; 2.5 MG/1; MG/1; MG/1; MG/1
10 TABLET ORAL DAILY
Qty: 30 TABLET | Refills: 0 | Status: SHIPPED | OUTPATIENT
Start: 2023-12-19 | End: 2024-01-18

## 2023-12-08 RX ORDER — DEXTROAMPHETAMINE SACCHARATE, AMPHETAMINE ASPARTATE, DEXTROAMPHETAMINE SULFATE AND AMPHETAMINE SULFATE 2.5; 2.5; 2.5; 2.5 MG/1; MG/1; MG/1; MG/1
10 TABLET ORAL DAILY
Qty: 30 TABLET | Refills: 0 | Status: SHIPPED | OUTPATIENT
Start: 2024-02-15 | End: 2024-03-16

## 2023-12-08 RX ORDER — DEXTROAMPHETAMINE SACCHARATE, AMPHETAMINE ASPARTATE MONOHYDRATE, DEXTROAMPHETAMINE SULFATE AND AMPHETAMINE SULFATE 6.25; 6.25; 6.25; 6.25 MG/1; MG/1; MG/1; MG/1
25 CAPSULE, EXTENDED RELEASE ORAL DAILY
Qty: 30 CAPSULE | Refills: 0 | Status: SHIPPED | OUTPATIENT
Start: 2024-02-06 | End: 2024-03-07

## 2023-12-08 RX ORDER — DEXTROAMPHETAMINE SACCHARATE, AMPHETAMINE ASPARTATE MONOHYDRATE, DEXTROAMPHETAMINE SULFATE AND AMPHETAMINE SULFATE 6.25; 6.25; 6.25; 6.25 MG/1; MG/1; MG/1; MG/1
25 CAPSULE, EXTENDED RELEASE ORAL DAILY
Qty: 30 CAPSULE | Refills: 0 | Status: SHIPPED | OUTPATIENT
Start: 2023-12-08 | End: 2024-01-07

## 2023-12-08 ASSESSMENT — ANXIETY QUESTIONNAIRES
2. NOT BEING ABLE TO STOP OR CONTROL WORRYING: 1
5. BEING SO RESTLESS THAT IT IS HARD TO SIT STILL: 0
6. BECOMING EASILY ANNOYED OR IRRITABLE: 1
GAD7 TOTAL SCORE: 3
1. FEELING NERVOUS, ANXIOUS, OR ON EDGE: 1
3. WORRYING TOO MUCH ABOUT DIFFERENT THINGS: 0
IF YOU CHECKED OFF ANY PROBLEMS ON THIS QUESTIONNAIRE, HOW DIFFICULT HAVE THESE PROBLEMS MADE IT FOR YOU TO DO YOUR WORK, TAKE CARE OF THINGS AT HOME, OR GET ALONG WITH OTHER PEOPLE: SOMEWHAT DIFFICULT
7. FEELING AFRAID AS IF SOMETHING AWFUL MIGHT HAPPEN: 0
4. TROUBLE RELAXING: 0

## 2023-12-08 ASSESSMENT — PATIENT HEALTH QUESTIONNAIRE - PHQ9
2. FEELING DOWN, DEPRESSED OR HOPELESS: 0
4. FEELING TIRED OR HAVING LITTLE ENERGY: 1
7. TROUBLE CONCENTRATING ON THINGS, SUCH AS READING THE NEWSPAPER OR WATCHING TELEVISION: 0
9. THOUGHTS THAT YOU WOULD BE BETTER OFF DEAD, OR OF HURTING YOURSELF: 0
8. MOVING OR SPEAKING SO SLOWLY THAT OTHER PEOPLE COULD HAVE NOTICED. OR THE OPPOSITE, BEING SO FIGETY OR RESTLESS THAT YOU HAVE BEEN MOVING AROUND A LOT MORE THAN USUAL: 0
SUM OF ALL RESPONSES TO PHQ QUESTIONS 1-9: 3
5. POOR APPETITE OR OVEREATING: 2
3. TROUBLE FALLING OR STAYING ASLEEP: 0
1. LITTLE INTEREST OR PLEASURE IN DOING THINGS: 0
SUM OF ALL RESPONSES TO PHQ QUESTIONS 1-9: 3
SUM OF ALL RESPONSES TO PHQ QUESTIONS 1-9: 3
6. FEELING BAD ABOUT YOURSELF - OR THAT YOU ARE A FAILURE OR HAVE LET YOURSELF OR YOUR FAMILY DOWN: 0
SUM OF ALL RESPONSES TO PHQ QUESTIONS 1-9: 3
SUM OF ALL RESPONSES TO PHQ9 QUESTIONS 1 & 2: 0
10. IF YOU CHECKED OFF ANY PROBLEMS, HOW DIFFICULT HAVE THESE PROBLEMS MADE IT FOR YOU TO DO YOUR WORK, TAKE CARE OF THINGS AT HOME, OR GET ALONG WITH OTHER PEOPLE: 1

## 2023-12-08 NOTE — PROGRESS NOTES
5-9 = mild depression,   10-14 = moderate depression; 15-19 = moderately severe depression, 20-27 = severe depression    ASRS Scores:  ADHD screener results were positive. Inattentive:Part A - Total: 30  0-16 Unlikely  17-23 Likely  24+ Highly likely     Hyperactive/Impulsive: Part B - Total: 26  0-16 Unlikely  17-23 Likely  24+ Highly likely     Past Psychiatric History:               Prior hospitalizations: Denies              Prior diagnoses: Depression              Outpatient Treatment: See below                          Psychiatrist: [facility]                          Therapist: 5 years ago. Suicide Attempts: Denies              Hx SH:  Denies     Past Psychopharmacologic Trials (including response/reactions):  Buspar- light headedness, dizzy  Zoloft- rash  Paxil- hallucinations  Nortriptyline  Xanax- rash  Valium-rash  Wellbutrin    Current Psychiatric Medications:  Current Outpatient Medications   Medication Sig Dispense Refill    amphetamine-dextroamphetamine (ADDERALL XR) 25 MG extended release capsule Take 1 capsule by mouth daily for 30 days. Max Daily Amount: 25 mg 30 capsule 0    [START ON 1/7/2024] amphetamine-dextroamphetamine (ADDERALL XR) 25 MG extended release capsule Take 1 capsule by mouth daily for 30 days. Max Daily Amount: 25 mg 30 capsule 0    [START ON 2/6/2024] amphetamine-dextroamphetamine (ADDERALL XR) 25 MG extended release capsule Take 1 capsule by mouth daily for 30 days. Max Daily Amount: 25 mg 30 capsule 0    [START ON 12/19/2023] amphetamine-dextroamphetamine (ADDERALL, 10MG,) 10 MG tablet Take 1 tablet by mouth daily for 30 days. Max Daily Amount: 10 mg 30 tablet 0    [START ON 1/17/2024] amphetamine-dextroamphetamine (ADDERALL, 10MG,) 10 MG tablet Take 1 tablet by mouth daily for 30 days. Max Daily Amount: 10 mg 30 tablet 0    [START ON 2/15/2024] amphetamine-dextroamphetamine (ADDERALL, 10MG,) 10 MG tablet Take 1 tablet by mouth daily for 30 days.  Max Daily Amount: 10

## 2023-12-26 ENCOUNTER — HOSPITAL ENCOUNTER (EMERGENCY)
Age: 63
Discharge: HOME | End: 2023-12-26
Attending: EMERGENCY MEDICINE
Payer: COMMERCIAL

## 2023-12-26 VITALS
TEMPERATURE: 99.2 F | HEIGHT: 66 IN | OXYGEN SATURATION: 98 % | BODY MASS INDEX: 30.29 KG/M2 | WEIGHT: 188.49 LBS | HEART RATE: 79 BPM | RESPIRATION RATE: 18 BRPM | SYSTOLIC BLOOD PRESSURE: 154 MMHG | DIASTOLIC BLOOD PRESSURE: 89 MMHG

## 2023-12-26 DIAGNOSIS — H60.392 INFECTIVE OTITIS EXTERNA OF LEFT EAR: Primary | ICD-10-CM

## 2023-12-26 PROCEDURE — 99283 EMERGENCY DEPT VISIT LOW MDM: CPT

## 2023-12-27 NOTE — ED NOTES
Discharge instructions reviewed with patient and verbalized understanding, denies further questions and successful teach back occurred. Offered wheelchair for discharge and declined. Discharged ambulatory with steady gait to ED lobby. Written discharge instructions provided to patient. Prescription x1 sent electronically to patient's pharmacy.

## 2023-12-27 NOTE — ED PROVIDER NOTES
CHIEF COMPLAINT  Chief Complaint   Patient presents with    Otalgia     Patient with c/o left ear pain since November. Has been on zithromax, then on cipro. Completed cipro on Sunday. States has ENT appointment on 1/3/23, but states today pain was worse and feels like someone is stabbing her left ear. States she put some sweet oil on a cotton ball and when she took it out saw hard green drainage. Denies fever at home. Took 1/2 a tylenol #3 at 1600. HISTORY OF PRESENT ILLNESS  America Justin is a 66 y.o. female who presents to the ED complaining of a 1 month history of left ear pain having been given a course of Zithromax and Cipro for ear infection but reports that this morning her pain got significantly worse with pain was exacerbated by touching the ear and has noticed some green drainage from the ear. No vertigo, hearing loss. No history of trauma. No constitutional symptoms or fever. No other complaints, modifying factors or associated symptoms. Nursing notes reviewed.    Past Medical History:   Diagnosis Date    Allergic rhinitis     Anxiety disorder     Depression     HSV (herpes simplex virus) infection     IBS (irritable bowel syndrome)     SVT (supraventricular tachycardia)     Stewart-Parkinson-White syndrome      Past Surgical History:   Procedure Laterality Date    APPENDECTOMY  07/05/1999    CATARACT REMOVAL WITH IMPLANT Left     CHOLECYSTECTOMY      COLONOSCOPY  03/2020    Dr. Daron Barrgaan    COLONOSCOPY N/A 3/5/2020    COLORECTAL CANCER SCREENING, NOT HIGH RISK performed by Barbara Bearden MD at [address] Right 4/23/2021    EXCISION OF LARGE GANGLION CYST RIGHT FOOT AND ANKLE performed by Kym Mccray DPM at [address] [facility]  6-8-16    with mesh    OTHER SURGICAL HISTORY      bone spur removed from front of skull    PARTIAL HYSTERECTOMY (CERVIX NOT REMOVED)  07/16/1999    TONSILLECTOMY       Family History

## 2023-12-27 NOTE — ED TRIAGE NOTES
Patient ambulatory to Room 5 with c/o left ear pain that she has had since [facility] November. She reports she has been on 2 different antibiotics since that time. She was referred to ENT but unable to get appointment until 1/3/24. Patient states pain was worse today. She states it is a stabbing pain to her ear that causes pain with swallowing. She states she put sweet oil on a cotton ball in her left ear today and when she took it out she notices some hard green drainage on the cotton ball. She denies fever. States she took 1/2 of a tylenol #3 at 1600 with minimal relief. She is awake, alert, oriented, respirations easy & regular, skin w/d, cap refill brisk. Patient's  at bedside. Dr. Nevarez Dears in room to examine patient.

## 2024-01-03 ENCOUNTER — OFFICE VISIT (OUTPATIENT)
Dept: ENT CLINIC | Age: 64
End: 2024-01-03
Payer: COMMERCIAL

## 2024-01-03 VITALS
HEIGHT: 66 IN | BODY MASS INDEX: 29.57 KG/M2 | WEIGHT: 184 LBS | DIASTOLIC BLOOD PRESSURE: 81 MMHG | SYSTOLIC BLOOD PRESSURE: 134 MMHG | OXYGEN SATURATION: 85 % | HEART RATE: 100 BPM

## 2024-01-03 DIAGNOSIS — H60.392 OTHER INFECTIVE CHRONIC OTITIS EXTERNA OF LEFT EAR: Primary | ICD-10-CM

## 2024-01-03 PROCEDURE — 99204 OFFICE O/P NEW MOD 45 MIN: CPT | Performed by: OTOLARYNGOLOGY

## 2024-01-03 RX ORDER — LEVOFLOXACIN 500 MG/1
500 TABLET, FILM COATED ORAL DAILY
Qty: 7 TABLET | Refills: 0 | Status: SHIPPED | OUTPATIENT
Start: 2024-01-03 | End: 2024-01-10

## 2024-01-03 NOTE — PROGRESS NOTES
[facility]  DIVISION OF OTOLARYNGOLOGY- HEAD & NECK SURGERY  CONSULT      Patient Name: Johana Magallon  Medical Record Number:  [id number]  Primary Care Physician:  Marty Booker DO  Date of Consultation: 1/3/2024    Chief Complaint: Left ear pain        HISTORY OF PRESENT ILLNESS  Johana is a(n) 63 y.o. female who presents for evaluation of issues with her left ear.  The patient says that she started having left-sided ear issues in November.  She has been having drainage and discomfort.  The right ear has not been a problem.  She does not historically have issues with her ears.  She is never had ear surgery.  She said that she had oral antibiotics as well as drops.  It seems to help, but it does not alleviate the symptoms entirely.            REVIEW OF SYSTEMS  As above    PHYSICAL EXAM  GENERAL: No Acute Distress, Alert and Oriented, no Hoarseness, strong voice  EYES: EOMI, Anti-icteric  HENT:   Head: Normocephalic and atraumatic.   Face:  Symmetric, facial nerve intact, no sinus tenderness  Ears: See below  Mouth/Oral Cavity:  normal lips, Uvula is midline, no mucosal lesions, no trismus  Oropharynx/Larynx:  normal oropharynx  Nose:Normal external nasal appearance.  NECK: Normal range of motion, no thyromegaly, trachea is midline, no lymphadenopathy, no neck masses, no crepitus          PROCEDURE  Bilateral ear exam  Right ear was visualized binocular scope.  Tympanic membrane intact with aerated middle ear.  Left side she had what appeared to be to furuncle of the posterior canal.  I suctioned off the roof of it and there was a bit of purulent drainage.  Tympanic membrane intact with aerated middle ear        ASSESSMENT/PLAN  1. Other infective chronic otitis externa of left ear  The patient appears to have a furuncle of the left ear canal.  I feel as though she needs another round of antibiotics.  Unfortunately she has multiple different allergies.  I would give her Levaquin since one of the few

## 2024-01-05 DIAGNOSIS — F41.1 GAD (GENERALIZED ANXIETY DISORDER): ICD-10-CM

## 2024-01-05 RX ORDER — HYDROXYZINE HYDROCHLORIDE 10 MG/1
10 TABLET, FILM COATED ORAL 3 TIMES DAILY PRN
Qty: 90 TABLET | Refills: 0 | Status: SHIPPED | OUTPATIENT
Start: 2024-01-05

## 2024-01-12 ENCOUNTER — TELEPHONE (OUTPATIENT)
Dept: FAMILY MEDICINE CLINIC | Age: 64
End: 2024-01-12

## 2024-01-12 NOTE — TELEPHONE ENCOUNTER
Pt called stating that she needed a refill of her amphetamine-dextroamphetamine (ADDERALL XR) 25 MG extended release capsule ()   Pt also said that the 10 mg she was on made her sick and she said that they looked different than the 25 so she threw them away.   To be called into [facility] [address] in [place]

## 2024-01-12 NOTE — TELEPHONE ENCOUNTER
Called patient and informed her that she had January and February refills at the pharmacy already for the 25mg Adderall.

## 2024-01-17 ENCOUNTER — OFFICE VISIT (OUTPATIENT)
Dept: ENT CLINIC | Age: 64
End: 2024-01-17
Payer: COMMERCIAL

## 2024-01-17 VITALS
DIASTOLIC BLOOD PRESSURE: 79 MMHG | RESPIRATION RATE: 16 BRPM | TEMPERATURE: 97.1 F | HEART RATE: 70 BPM | BODY MASS INDEX: 29.57 KG/M2 | SYSTOLIC BLOOD PRESSURE: 112 MMHG | HEIGHT: 66 IN | WEIGHT: 184 LBS | OXYGEN SATURATION: 97 %

## 2024-01-17 DIAGNOSIS — H60.392 OTHER INFECTIVE ACUTE OTITIS EXTERNA OF LEFT EAR: Primary | ICD-10-CM

## 2024-01-17 PROCEDURE — 99213 OFFICE O/P EST LOW 20 MIN: CPT | Performed by: OTOLARYNGOLOGY

## 2024-01-17 NOTE — PROGRESS NOTES
[facility]  DIVISION OF OTOLARYNGOLOGY- HEAD & NECK SURGERY  Follow up      Patient Name: Johana Magallon  Medical Record Number:  [id number]  Primary Care Physician:  Marty Booker DO  Date of Consultation: 1/17/2024    Chief Complaint: Left ear issues        Interval History    Patient following up for her left ear.  I saw her on January 3.  She was having what appeared to be a phone call the lateral canal on the left side.  I gave her Levaquin.  She says is better, but still draining a little bit.          REVIEW OF SYSTEMS  As above    PHYSICAL EXAM  GENERAL: No Acute Distress, Alert and Oriented, no Hoarseness, strong voice  EYES: EOMI, Anti-icteric  HENT:   Head: Normocephalic and atraumatic.   Face:  Symmetric, facial nerve intact, no sinus tenderness  Right Ear: Normal external ear, normal external auditory canal, intact tympanic membrane with normal mobility and aerated middle ear  Left Ear: Significant improvement of the infection of the lateral canal.  You can still see the bump, but it is not erythematous              ASSESSMENT/PLAN  1. Other infective acute otitis externa of left ear  This has significantly improved.  I would like for her to follow-up if it starts getting worse.             I have performed a head and neck physical exam personally or was physically present during the key or critical portions of the service.    This note was generated completely or in part utilizing Dragon dictation speech recognition software.  Occasionally, words are mistranscribed and despite editing, the text may contain inaccuracies due to incorrect word recognition.  If further clarification is needed please contact the office at [phone].

## 2024-01-29 RX ORDER — GABAPENTIN 300 MG/1
300 CAPSULE ORAL 3 TIMES DAILY
Qty: 90 CAPSULE | Refills: 0 | Status: SHIPPED | OUTPATIENT
Start: 2024-01-29 | End: 2024-02-28

## 2024-02-02 DIAGNOSIS — F41.1 GAD (GENERALIZED ANXIETY DISORDER): ICD-10-CM

## 2024-02-02 RX ORDER — HYDROXYZINE HYDROCHLORIDE 10 MG/1
10 TABLET, FILM COATED ORAL 3 TIMES DAILY PRN
Qty: 90 TABLET | Refills: 0 | Status: SHIPPED | OUTPATIENT
Start: 2024-02-02

## 2024-02-14 ENCOUNTER — OFFICE VISIT (OUTPATIENT)
Dept: FAMILY MEDICINE CLINIC | Age: 64
End: 2024-02-14
Payer: COMMERCIAL

## 2024-02-14 VITALS
SYSTOLIC BLOOD PRESSURE: 124 MMHG | HEIGHT: 66 IN | WEIGHT: 184.8 LBS | TEMPERATURE: 97.9 F | BODY MASS INDEX: 29.7 KG/M2 | DIASTOLIC BLOOD PRESSURE: 76 MMHG

## 2024-02-14 DIAGNOSIS — F32.A DEPRESSION, UNSPECIFIED DEPRESSION TYPE: ICD-10-CM

## 2024-02-14 DIAGNOSIS — E78.2 MIXED HYPERLIPIDEMIA: Primary | ICD-10-CM

## 2024-02-14 PROBLEM — H10.021 PINK EYE DISEASE OF RIGHT EYE: Status: RESOLVED | Noted: 2018-03-07 | Resolved: 2024-02-14

## 2024-02-14 PROBLEM — F90.9 ATTENTION DEFICIT HYPERACTIVITY DISORDER (ADHD): Status: ACTIVE | Noted: 2023-12-21

## 2024-02-14 PROCEDURE — 99213 OFFICE O/P EST LOW 20 MIN: CPT

## 2024-02-14 RX ORDER — ATORVASTATIN CALCIUM 10 MG/1
10 TABLET, FILM COATED ORAL DAILY
Qty: 90 TABLET | Refills: 2 | Status: SHIPPED | OUTPATIENT
Start: 2024-02-14

## 2024-02-14 NOTE — PROGRESS NOTES
shortness of breath and wheezing.    Cardiovascular:  Negative for chest pain, palpitations and leg swelling.   Gastrointestinal:  Negative for abdominal pain, blood in stool, constipation, diarrhea, nausea and vomiting.   Endocrine: Negative for cold intolerance and heat intolerance.   Genitourinary:  Negative for difficulty urinating, dysuria, flank pain, frequency and urgency.   Musculoskeletal:  Negative for arthralgias, back pain, gait problem and myalgias.   Skin:  Negative for color change, rash and wound.   Neurological:  Negative for dizziness, weakness, light-headedness and headaches.   Hematological:  Does not bruise/bleed easily.   Psychiatric/Behavioral:  Negative for dysphoric mood and suicidal ideas. The patient is not nervous/anxious.        Vitals:    02/14/24 1251   BP: 124/76   Site: Left Upper Arm   Position: Sitting   Cuff Size: Medium Adult   Temp: 97.9 °F (36.6 °C)   TempSrc: Temporal   Weight: 83.8 kg (184 lb 12.8 oz)   Height: 1.676 m (5' 6\")       Physical Exam  Vitals reviewed.   Constitutional:       Appearance: Normal appearance.   HENT:      Head: Normocephalic.      Right Ear: External ear normal.      Left Ear: External ear normal.      Nose: Nose normal. No congestion.      Mouth/Throat:      Mouth: Mucous membranes are moist.   Eyes:      Pupils: Pupils are equal, round, and reactive to light.   Cardiovascular:      Rate and Rhythm: Normal rate and regular rhythm.   Pulmonary:      Effort: Pulmonary effort is normal.      Breath sounds: Normal breath sounds. No wheezing or rhonchi.   Abdominal:      General: Abdomen is flat.      Palpations: Abdomen is soft.   Musculoskeletal:         General: No swelling. Normal range of motion.      Cervical back: Normal range of motion and neck supple.   Lymphadenopathy:      Cervical: No cervical adenopathy.   Skin:     General: Skin is warm.      Coloration: Skin is not jaundiced.   Neurological:      General: No focal deficit present.

## 2024-02-19 RX ORDER — ALENDRONATE SODIUM 70 MG/1
70 TABLET ORAL
Qty: 12 TABLET | Refills: 1 | Status: SHIPPED | OUTPATIENT
Start: 2024-02-19

## 2024-02-26 RX ORDER — GABAPENTIN 300 MG/1
300 CAPSULE ORAL 3 TIMES DAILY
Qty: 90 CAPSULE | Refills: 0 | Status: SHIPPED | OUTPATIENT
Start: 2024-02-26 | End: 2024-03-27

## 2024-03-04 DIAGNOSIS — F41.1 GAD (GENERALIZED ANXIETY DISORDER): ICD-10-CM

## 2024-03-04 RX ORDER — HYDROXYZINE HYDROCHLORIDE 10 MG/1
10 TABLET, FILM COATED ORAL 3 TIMES DAILY PRN
Qty: 90 TABLET | Refills: 0 | Status: SHIPPED | OUTPATIENT
Start: 2024-03-04

## 2024-03-08 ENCOUNTER — OFFICE VISIT (OUTPATIENT)
Dept: PSYCHIATRY | Age: 64
End: 2024-03-08
Payer: COMMERCIAL

## 2024-03-08 VITALS
WEIGHT: 180 LBS | BODY MASS INDEX: 28.93 KG/M2 | DIASTOLIC BLOOD PRESSURE: 76 MMHG | SYSTOLIC BLOOD PRESSURE: 128 MMHG | HEIGHT: 66 IN

## 2024-03-08 DIAGNOSIS — F90.0 ADHD (ATTENTION DEFICIT HYPERACTIVITY DISORDER), INATTENTIVE TYPE: Primary | ICD-10-CM

## 2024-03-08 DIAGNOSIS — F33.2 SEVERE EPISODE OF RECURRENT MAJOR DEPRESSIVE DISORDER, WITHOUT PSYCHOTIC FEATURES (HCC): ICD-10-CM

## 2024-03-08 DIAGNOSIS — G47.9 SLEEP DIFFICULTIES: ICD-10-CM

## 2024-03-08 DIAGNOSIS — F41.1 GAD (GENERALIZED ANXIETY DISORDER): ICD-10-CM

## 2024-03-08 PROCEDURE — 99213 OFFICE O/P EST LOW 20 MIN: CPT | Performed by: NURSE PRACTITIONER

## 2024-03-08 RX ORDER — DEXTROAMPHETAMINE SACCHARATE, AMPHETAMINE ASPARTATE, DEXTROAMPHETAMINE SULFATE AND AMPHETAMINE SULFATE 1.25; 1.25; 1.25; 1.25 MG/1; MG/1; MG/1; MG/1
5 TABLET ORAL 2 TIMES DAILY
Qty: 60 TABLET | Refills: 0 | Status: SHIPPED | OUTPATIENT
Start: 2024-05-09 | End: 2024-06-08

## 2024-03-08 RX ORDER — QUETIAPINE FUMARATE 25 MG/1
TABLET, FILM COATED ORAL
Qty: 60 TABLET | Refills: 3 | Status: SHIPPED | OUTPATIENT
Start: 2024-03-08

## 2024-03-08 RX ORDER — DEXTROAMPHETAMINE SACCHARATE, AMPHETAMINE ASPARTATE MONOHYDRATE, DEXTROAMPHETAMINE SULFATE AND AMPHETAMINE SULFATE 6.25; 6.25; 6.25; 6.25 MG/1; MG/1; MG/1; MG/1
25 CAPSULE, EXTENDED RELEASE ORAL DAILY
Qty: 30 CAPSULE | Refills: 0 | Status: SHIPPED | OUTPATIENT
Start: 2024-05-10 | End: 2024-06-09

## 2024-03-08 RX ORDER — DEXTROAMPHETAMINE SACCHARATE, AMPHETAMINE ASPARTATE, DEXTROAMPHETAMINE SULFATE AND AMPHETAMINE SULFATE 1.25; 1.25; 1.25; 1.25 MG/1; MG/1; MG/1; MG/1
5 TABLET ORAL 2 TIMES DAILY
Qty: 60 TABLET | Refills: 0 | Status: SHIPPED | OUTPATIENT
Start: 2024-03-13 | End: 2024-04-12

## 2024-03-08 RX ORDER — DEXTROAMPHETAMINE SACCHARATE, AMPHETAMINE ASPARTATE MONOHYDRATE, DEXTROAMPHETAMINE SULFATE AND AMPHETAMINE SULFATE 6.25; 6.25; 6.25; 6.25 MG/1; MG/1; MG/1; MG/1
25 CAPSULE, EXTENDED RELEASE ORAL DAILY
Qty: 30 CAPSULE | Refills: 0 | Status: SHIPPED | OUTPATIENT
Start: 2024-03-13 | End: 2024-04-12

## 2024-03-08 RX ORDER — DEXTROAMPHETAMINE SACCHARATE, AMPHETAMINE ASPARTATE MONOHYDRATE, DEXTROAMPHETAMINE SULFATE AND AMPHETAMINE SULFATE 6.25; 6.25; 6.25; 6.25 MG/1; MG/1; MG/1; MG/1
25 CAPSULE, EXTENDED RELEASE ORAL DAILY
Qty: 30 CAPSULE | Refills: 0 | Status: SHIPPED | OUTPATIENT
Start: 2024-04-11 | End: 2024-05-11

## 2024-03-08 RX ORDER — DEXTROAMPHETAMINE SACCHARATE, AMPHETAMINE ASPARTATE, DEXTROAMPHETAMINE SULFATE AND AMPHETAMINE SULFATE 1.25; 1.25; 1.25; 1.25 MG/1; MG/1; MG/1; MG/1
5 TABLET ORAL 2 TIMES DAILY
Qty: 60 TABLET | Refills: 0 | Status: SHIPPED | OUTPATIENT
Start: 2024-04-10 | End: 2024-05-10

## 2024-03-08 ASSESSMENT — ANXIETY QUESTIONNAIRES
IF YOU CHECKED OFF ANY PROBLEMS ON THIS QUESTIONNAIRE, HOW DIFFICULT HAVE THESE PROBLEMS MADE IT FOR YOU TO DO YOUR WORK, TAKE CARE OF THINGS AT HOME, OR GET ALONG WITH OTHER PEOPLE: NOT DIFFICULT AT ALL
2. NOT BEING ABLE TO STOP OR CONTROL WORRYING: 0
1. FEELING NERVOUS, ANXIOUS, OR ON EDGE: 1
5. BEING SO RESTLESS THAT IT IS HARD TO SIT STILL: 0
3. WORRYING TOO MUCH ABOUT DIFFERENT THINGS: 0
4. TROUBLE RELAXING: 0
GAD7 TOTAL SCORE: 2
6. BECOMING EASILY ANNOYED OR IRRITABLE: 1
7. FEELING AFRAID AS IF SOMETHING AWFUL MIGHT HAPPEN: 0

## 2024-03-08 ASSESSMENT — PATIENT HEALTH QUESTIONNAIRE - PHQ9
9. THOUGHTS THAT YOU WOULD BE BETTER OFF DEAD, OR OF HURTING YOURSELF: 0
6. FEELING BAD ABOUT YOURSELF - OR THAT YOU ARE A FAILURE OR HAVE LET YOURSELF OR YOUR FAMILY DOWN: 1
SUM OF ALL RESPONSES TO PHQ QUESTIONS 1-9: 7
10. IF YOU CHECKED OFF ANY PROBLEMS, HOW DIFFICULT HAVE THESE PROBLEMS MADE IT FOR YOU TO DO YOUR WORK, TAKE CARE OF THINGS AT HOME, OR GET ALONG WITH OTHER PEOPLE: 1
1. LITTLE INTEREST OR PLEASURE IN DOING THINGS: 1
7. TROUBLE CONCENTRATING ON THINGS, SUCH AS READING THE NEWSPAPER OR WATCHING TELEVISION: 0
4. FEELING TIRED OR HAVING LITTLE ENERGY: 1
3. TROUBLE FALLING OR STAYING ASLEEP: 1
5. POOR APPETITE OR OVEREATING: 2
2. FEELING DOWN, DEPRESSED OR HOPELESS: 1
SUM OF ALL RESPONSES TO PHQ9 QUESTIONS 1 & 2: 2
SUM OF ALL RESPONSES TO PHQ QUESTIONS 1-9: 7
8. MOVING OR SPEAKING SO SLOWLY THAT OTHER PEOPLE COULD HAVE NOTICED. OR THE OPPOSITE, BEING SO FIGETY OR RESTLESS THAT YOU HAVE BEEN MOVING AROUND A LOT MORE THAN USUAL: 0

## 2024-03-08 NOTE — PROGRESS NOTES
(3/8/2024  8:51 AM)    Interpretation of PHQ-9 score:  1-4 = minimal depression, 5-9 = mild depression,   10-14 = moderate depression; 15-19 = moderately severe depression, 20-27 = severe depression    ASRS Scores:  ADHD screener results were positive.  Inattentive:Part A - Total: 30  0-16 Unlikely  17-23 Likely  24+ Highly likely     Hyperactive/Impulsive: Part B - Total: 26  0-16 Unlikely  17-23 Likely  24+ Highly likely     Past Psychiatric History:               Prior hospitalizations: Denies              Prior diagnoses: Depression              Outpatient Treatment: See below                          Psychiatrist: Denies                          Therapist: 5 years ago.               Suicide Attempts: Denies              Hx SH:  Denies     Past Psychopharmacologic Trials (including response/reactions):  Buspar- light headedness, dizzy  Zoloft- rash  Paxil- hallucinations  Nortriptyline  Xanax- rash  Valium-rash  Wellbutrin    Current Psychiatric Medications:  Current Outpatient Medications   Medication Sig Dispense Refill    [START ON 3/13/2024] amphetamine-dextroamphetamine (ADDERALL XR) 25 MG extended release capsule Take 1 capsule by mouth daily for 30 days. Max Daily Amount: 25 mg 30 capsule 0    [START ON 4/11/2024] amphetamine-dextroamphetamine (ADDERALL XR) 25 MG extended release capsule Take 1 capsule by mouth daily for 30 days. Max Daily Amount: 25 mg 30 capsule 0    [START ON 5/10/2024] amphetamine-dextroamphetamine (ADDERALL XR) 25 MG extended release capsule Take 1 capsule by mouth daily for 30 days. Max Daily Amount: 25 mg 30 capsule 0    [START ON 3/13/2024] amphetamine-dextroamphetamine (ADDERALL, 5MG,) 5 MG tablet Take 1 tablet by mouth 2 times daily for 30 days. Max Daily Amount: 10 mg 60 tablet 0    [START ON 4/10/2024] amphetamine-dextroamphetamine (ADDERALL, 5MG,) 5 MG tablet Take 1 tablet by mouth 2 times daily for 30 days. Max Daily Amount: 10 mg 60 tablet 0    [START ON 5/9/2024]

## 2024-04-24 RX ORDER — GABAPENTIN 300 MG/1
300 CAPSULE ORAL 3 TIMES DAILY
Qty: 90 CAPSULE | Refills: 3 | Status: SHIPPED | OUTPATIENT
Start: 2024-04-24 | End: 2024-08-22

## 2024-04-29 ENCOUNTER — OFFICE VISIT (OUTPATIENT)
Dept: FAMILY MEDICINE CLINIC | Age: 64
End: 2024-04-29
Payer: COMMERCIAL

## 2024-04-29 VITALS
HEIGHT: 66 IN | SYSTOLIC BLOOD PRESSURE: 124 MMHG | WEIGHT: 175.3 LBS | BODY MASS INDEX: 28.17 KG/M2 | TEMPERATURE: 97.3 F | DIASTOLIC BLOOD PRESSURE: 72 MMHG

## 2024-04-29 DIAGNOSIS — M25.40 JOINT SWELLING: ICD-10-CM

## 2024-04-29 DIAGNOSIS — M25.40 JOINT SWELLING: Primary | ICD-10-CM

## 2024-04-29 LAB
ALBUMIN SERPL-MCNC: 4.7 G/DL (ref 3.4–5)
ALBUMIN/GLOB SERPL: 2.1 {RATIO} (ref 1.1–2.2)
ALP SERPL-CCNC: 75 U/L (ref 40–129)
ALT SERPL-CCNC: 17 U/L (ref 10–40)
ANION GAP SERPL CALCULATED.3IONS-SCNC: 10 MMOL/L (ref 3–16)
AST SERPL-CCNC: 26 U/L (ref 15–37)
BILIRUB SERPL-MCNC: 0.4 MG/DL (ref 0–1)
BUN SERPL-MCNC: 5 MG/DL (ref 7–20)
CALCIUM SERPL-MCNC: 9.6 MG/DL (ref 8.3–10.6)
CHLORIDE SERPL-SCNC: 105 MMOL/L (ref 99–110)
CO2 SERPL-SCNC: 27 MMOL/L (ref 21–32)
CREAT SERPL-MCNC: 0.5 MG/DL (ref 0.6–1.2)
ERYTHROCYTE [SEDIMENTATION RATE] IN BLOOD BY WESTERGREN METHOD: 5 MM/HR (ref 0–30)
GFR SERPLBLD CREATININE-BSD FMLA CKD-EPI: >90 ML/MIN/{1.73_M2}
GLUCOSE SERPL-MCNC: 88 MG/DL (ref 70–99)
POTASSIUM SERPL-SCNC: 4.2 MMOL/L (ref 3.5–5.1)
PROT SERPL-MCNC: 6.9 G/DL (ref 6.4–8.2)
RHEUMATOID FACT SER IA-ACNC: <10 IU/ML
SODIUM SERPL-SCNC: 142 MMOL/L (ref 136–145)
URATE SERPL-MCNC: 4 MG/DL (ref 2.6–6)

## 2024-04-29 PROCEDURE — 99214 OFFICE O/P EST MOD 30 MIN: CPT

## 2024-04-29 RX ORDER — ATORVASTATIN CALCIUM 10 MG/1
10 TABLET, FILM COATED ORAL DAILY
Qty: 90 TABLET | Refills: 2 | Status: SHIPPED | OUTPATIENT
Start: 2024-04-29

## 2024-04-29 ASSESSMENT — ENCOUNTER SYMPTOMS
VOMITING: 0
WHEEZING: 0
SORE THROAT: 0
APNEA: 0
NAUSEA: 0
ABDOMINAL PAIN: 0
SHORTNESS OF BREATH: 0
CONSTIPATION: 0
BACK PAIN: 0
TROUBLE SWALLOWING: 0
DIARRHEA: 0
COUGH: 0
BLOOD IN STOOL: 0
COLOR CHANGE: 0
SINUS PRESSURE: 0

## 2024-04-29 NOTE — ASSESSMENT & PLAN NOTE
Swelling and pain overall improved.  Will get blood work today to rule out rheumatoid arthritis or any other cause for inflammation.  Patient voiced understanding.

## 2024-04-29 NOTE — PROGRESS NOTES
Johana Magallon (:  1960) is a 63 y.o. female,Established patient, here for evaluation of the following chief complaint(s):  Joint Pain (Patient c/o sudden onset of joint pain and stiffness on 2024.  Patient notes that she had swelling and rash at that time)      ASSESSMENT/PLAN:  1. Joint swelling  Assessment & Plan:    Swelling and pain overall improved.  Will get blood work today to rule out rheumatoid arthritis or any other cause for inflammation.  Patient voiced understanding.  Orders:  -     RHEUMATOID FACTOR; Future  -     Sedimentation Rate; Future  -     Uric Acid; Future  -     Comprehensive Metabolic Panel; Future      Return in about 8 months (around 2024) for check in- fasting.    SUBJECTIVE/OBJECTIVE:    Johana presents today with concerns of joint pain, swelling and stiffness that started on .  She states she woke up in the middle the night with severe pain and swelling on her elbow joints, knees, hands and wrists.  She states she has never had this happen before. No known injury. She denies any fevers, chills.  She states it was very painful to move, swelling went down on its own slowly with time.  Using over-the-counter medication.  This past week, swelling has been much more controlled, almost back to normal.    She has been taking Lipitor, but ran out of this 3 weeks ago.  Does not believe that this is playing a role in this pain.    She also tried lowering her gabapentin to 2 tablets a day instead of 3, she is unsure if this would have caused this type of reaction.  She has never had this happen before when she has lowered her gabapentin.    Mom has osteoarthritis, no history of rheumatoid arthritis in the family.  She has never been diagnosed with rheumatoid arthritis.        Current Outpatient Medications   Medication Sig Dispense Refill    atorvastatin (LIPITOR) 10 MG tablet Take 1 tablet by mouth daily 90 tablet 2    gabapentin (NEURONTIN) 300 MG capsule Take 1 capsule

## 2024-05-04 ENCOUNTER — HOSPITAL ENCOUNTER (OUTPATIENT)
Age: 64
Discharge: HOME | End: 2024-05-04
Payer: COMMERCIAL

## 2024-05-04 ENCOUNTER — HOSPITAL ENCOUNTER (OUTPATIENT)
Dept: GENERAL RADIOLOGY | Age: 64
Discharge: HOME | End: 2024-05-04
Payer: COMMERCIAL

## 2024-05-04 DIAGNOSIS — L08.9 LOCAL INFECTION OF THE SKIN AND SUBCUTANEOUS TISSUE, UNSPECIFIED: ICD-10-CM

## 2024-05-04 PROCEDURE — 73630 X-RAY EXAM OF FOOT: CPT

## 2024-06-11 ENCOUNTER — TELEPHONE (OUTPATIENT)
Dept: FAMILY MEDICINE CLINIC | Age: 64
End: 2024-06-11

## 2024-06-11 NOTE — TELEPHONE ENCOUNTER
Pt called requesting a refill of her amphetamine-dextroamphetamine (ADDERALL XR) 25 MG extended release capsule () to be called into [facility] [address] [place]

## 2024-06-12 DIAGNOSIS — F90.0 ADHD (ATTENTION DEFICIT HYPERACTIVITY DISORDER), INATTENTIVE TYPE: ICD-10-CM

## 2024-06-12 RX ORDER — DEXTROAMPHETAMINE SACCHARATE, AMPHETAMINE ASPARTATE MONOHYDRATE, DEXTROAMPHETAMINE SULFATE AND AMPHETAMINE SULFATE 6.25; 6.25; 6.25; 6.25 MG/1; MG/1; MG/1; MG/1
25 CAPSULE, EXTENDED RELEASE ORAL DAILY
Qty: 30 CAPSULE | Refills: 0 | Status: SHIPPED | OUTPATIENT
Start: 2024-06-12 | End: 2024-07-12

## 2024-06-12 RX ORDER — DEXTROAMPHETAMINE SACCHARATE, AMPHETAMINE ASPARTATE, DEXTROAMPHETAMINE SULFATE AND AMPHETAMINE SULFATE 1.25; 1.25; 1.25; 1.25 MG/1; MG/1; MG/1; MG/1
5 TABLET ORAL 2 TIMES DAILY
Qty: 60 TABLET | Refills: 0 | Status: SHIPPED | OUTPATIENT
Start: 2024-06-12 | End: 2024-07-12

## 2024-07-03 DIAGNOSIS — F41.1 GAD (GENERALIZED ANXIETY DISORDER): ICD-10-CM

## 2024-07-03 DIAGNOSIS — F33.2 SEVERE EPISODE OF RECURRENT MAJOR DEPRESSIVE DISORDER, WITHOUT PSYCHOTIC FEATURES (HCC): ICD-10-CM

## 2024-07-03 DIAGNOSIS — G47.9 SLEEP DIFFICULTIES: ICD-10-CM

## 2024-07-03 RX ORDER — QUETIAPINE FUMARATE 25 MG/1
TABLET, FILM COATED ORAL
Qty: 60 TABLET | Refills: 3 | Status: SHIPPED | OUTPATIENT
Start: 2024-07-03

## 2024-07-12 RX ORDER — ALENDRONATE SODIUM 70 MG/1
70 TABLET ORAL
Qty: 12 TABLET | Refills: 1 | Status: SHIPPED | OUTPATIENT
Start: 2024-07-12

## 2024-07-15 DIAGNOSIS — F33.2 SEVERE EPISODE OF RECURRENT MAJOR DEPRESSIVE DISORDER, WITHOUT PSYCHOTIC FEATURES (HCC): ICD-10-CM

## 2024-07-15 DIAGNOSIS — F90.0 ADHD (ATTENTION DEFICIT HYPERACTIVITY DISORDER), INATTENTIVE TYPE: ICD-10-CM

## 2024-07-15 DIAGNOSIS — G47.9 SLEEP DIFFICULTIES: ICD-10-CM

## 2024-07-15 DIAGNOSIS — F41.1 GAD (GENERALIZED ANXIETY DISORDER): ICD-10-CM

## 2024-07-16 RX ORDER — DEXTROAMPHETAMINE SACCHARATE, AMPHETAMINE ASPARTATE, DEXTROAMPHETAMINE SULFATE AND AMPHETAMINE SULFATE 1.25; 1.25; 1.25; 1.25 MG/1; MG/1; MG/1; MG/1
5 TABLET ORAL 2 TIMES DAILY
Qty: 60 TABLET | Refills: 0 | Status: SHIPPED | OUTPATIENT
Start: 2024-07-16 | End: 2024-08-15

## 2024-07-16 RX ORDER — DEXTROAMPHETAMINE SACCHARATE, AMPHETAMINE ASPARTATE, DEXTROAMPHETAMINE SULFATE AND AMPHETAMINE SULFATE 1.25; 1.25; 1.25; 1.25 MG/1; MG/1; MG/1; MG/1
5 TABLET ORAL 2 TIMES DAILY
Qty: 60 TABLET | Refills: 0 | OUTPATIENT
Start: 2024-07-16 | End: 2024-08-15

## 2024-07-16 RX ORDER — DEXTROAMPHETAMINE SACCHARATE, AMPHETAMINE ASPARTATE MONOHYDRATE, DEXTROAMPHETAMINE SULFATE AND AMPHETAMINE SULFATE 6.25; 6.25; 6.25; 6.25 MG/1; MG/1; MG/1; MG/1
25 CAPSULE, EXTENDED RELEASE ORAL DAILY
Qty: 30 CAPSULE | Refills: 0 | Status: SHIPPED | OUTPATIENT
Start: 2024-07-16 | End: 2024-08-15

## 2024-07-16 RX ORDER — HYDROXYZINE HYDROCHLORIDE 10 MG/1
10 TABLET, FILM COATED ORAL 3 TIMES DAILY PRN
Qty: 90 TABLET | Refills: 0 | Status: SHIPPED | OUTPATIENT
Start: 2024-07-16

## 2024-07-16 RX ORDER — QUETIAPINE FUMARATE 25 MG/1
TABLET, FILM COATED ORAL
Qty: 60 TABLET | Refills: 3 | OUTPATIENT
Start: 2024-07-16

## 2024-08-13 DIAGNOSIS — F90.0 ADHD (ATTENTION DEFICIT HYPERACTIVITY DISORDER), INATTENTIVE TYPE: ICD-10-CM

## 2024-08-14 RX ORDER — DEXTROAMPHETAMINE SACCHARATE, AMPHETAMINE ASPARTATE, DEXTROAMPHETAMINE SULFATE AND AMPHETAMINE SULFATE 1.25; 1.25; 1.25; 1.25 MG/1; MG/1; MG/1; MG/1
5 TABLET ORAL 2 TIMES DAILY
Qty: 60 TABLET | Refills: 0 | OUTPATIENT
Start: 2024-08-14 | End: 2024-09-13

## 2024-08-14 RX ORDER — DEXTROAMPHETAMINE SACCHARATE, AMPHETAMINE ASPARTATE MONOHYDRATE, DEXTROAMPHETAMINE SULFATE AND AMPHETAMINE SULFATE 6.25; 6.25; 6.25; 6.25 MG/1; MG/1; MG/1; MG/1
25 CAPSULE, EXTENDED RELEASE ORAL DAILY
Qty: 30 CAPSULE | Refills: 0 | Status: SHIPPED | OUTPATIENT
Start: 2024-08-14 | End: 2024-09-13

## 2024-08-15 RX ORDER — GABAPENTIN 300 MG/1
300 CAPSULE ORAL 3 TIMES DAILY
Qty: 90 CAPSULE | Refills: 3 | Status: SHIPPED | OUTPATIENT
Start: 2024-08-15 | End: 2024-12-13

## 2024-09-06 ENCOUNTER — SCHEDULED TELEPHONE ENCOUNTER (OUTPATIENT)
Dept: PSYCHIATRY | Age: 64
End: 2024-09-06
Payer: COMMERCIAL

## 2024-09-06 DIAGNOSIS — F90.0 ADHD (ATTENTION DEFICIT HYPERACTIVITY DISORDER), INATTENTIVE TYPE: ICD-10-CM

## 2024-09-06 PROCEDURE — 99442 PR PHYS/QHP TELEPHONE EVALUATION 11-20 MIN: CPT | Performed by: NURSE PRACTITIONER

## 2024-09-06 RX ORDER — DEXTROAMPHETAMINE SACCHARATE, AMPHETAMINE ASPARTATE MONOHYDRATE, DEXTROAMPHETAMINE SULFATE AND AMPHETAMINE SULFATE 6.25; 6.25; 6.25; 6.25 MG/1; MG/1; MG/1; MG/1
25 CAPSULE, EXTENDED RELEASE ORAL DAILY
Qty: 30 CAPSULE | Refills: 0 | Status: SHIPPED | OUTPATIENT
Start: 2024-09-12 | End: 2024-10-12

## 2024-09-06 RX ORDER — DEXTROAMPHETAMINE SACCHARATE, AMPHETAMINE ASPARTATE MONOHYDRATE, DEXTROAMPHETAMINE SULFATE AND AMPHETAMINE SULFATE 6.25; 6.25; 6.25; 6.25 MG/1; MG/1; MG/1; MG/1
25 CAPSULE, EXTENDED RELEASE ORAL DAILY
Qty: 30 CAPSULE | Refills: 0 | Status: SHIPPED | OUTPATIENT
Start: 2024-10-11 | End: 2024-11-10

## 2024-09-06 RX ORDER — DEXTROAMPHETAMINE SACCHARATE, AMPHETAMINE ASPARTATE, DEXTROAMPHETAMINE SULFATE AND AMPHETAMINE SULFATE 1.25; 1.25; 1.25; 1.25 MG/1; MG/1; MG/1; MG/1
5 TABLET ORAL 2 TIMES DAILY
Qty: 60 TABLET | Refills: 0 | Status: SHIPPED | OUTPATIENT
Start: 2024-09-06 | End: 2024-10-06

## 2024-09-06 RX ORDER — DEXTROAMPHETAMINE SACCHARATE, AMPHETAMINE ASPARTATE MONOHYDRATE, DEXTROAMPHETAMINE SULFATE AND AMPHETAMINE SULFATE 6.25; 6.25; 6.25; 6.25 MG/1; MG/1; MG/1; MG/1
25 CAPSULE, EXTENDED RELEASE ORAL DAILY
Qty: 30 CAPSULE | Refills: 0 | Status: SHIPPED | OUTPATIENT
Start: 2024-11-09 | End: 2024-12-09

## 2024-09-06 ASSESSMENT — PATIENT HEALTH QUESTIONNAIRE - PHQ9
SUM OF ALL RESPONSES TO PHQ9 QUESTIONS 1 & 2: 2
2. FEELING DOWN, DEPRESSED OR HOPELESS: SEVERAL DAYS
SUM OF ALL RESPONSES TO PHQ QUESTIONS 1-9: 5
SUM OF ALL RESPONSES TO PHQ QUESTIONS 1-9: 5
3. TROUBLE FALLING OR STAYING ASLEEP: SEVERAL DAYS
7. TROUBLE CONCENTRATING ON THINGS, SUCH AS READING THE NEWSPAPER OR WATCHING TELEVISION: NOT AT ALL
1. LITTLE INTEREST OR PLEASURE IN DOING THINGS: SEVERAL DAYS
10. IF YOU CHECKED OFF ANY PROBLEMS, HOW DIFFICULT HAVE THESE PROBLEMS MADE IT FOR YOU TO DO YOUR WORK, TAKE CARE OF THINGS AT HOME, OR GET ALONG WITH OTHER PEOPLE: SOMEWHAT DIFFICULT
SUM OF ALL RESPONSES TO PHQ QUESTIONS 1-9: 5
4. FEELING TIRED OR HAVING LITTLE ENERGY: SEVERAL DAYS
9. THOUGHTS THAT YOU WOULD BE BETTER OFF DEAD, OR OF HURTING YOURSELF: NOT AT ALL
SUM OF ALL RESPONSES TO PHQ QUESTIONS 1-9: 5
6. FEELING BAD ABOUT YOURSELF - OR THAT YOU ARE A FAILURE OR HAVE LET YOURSELF OR YOUR FAMILY DOWN: NOT AT ALL
8. MOVING OR SPEAKING SO SLOWLY THAT OTHER PEOPLE COULD HAVE NOTICED. OR THE OPPOSITE, BEING SO FIGETY OR RESTLESS THAT YOU HAVE BEEN MOVING AROUND A LOT MORE THAN USUAL: NOT AT ALL
5. POOR APPETITE OR OVEREATING: SEVERAL DAYS

## 2024-09-06 ASSESSMENT — ANXIETY QUESTIONNAIRES
7. FEELING AFRAID AS IF SOMETHING AWFUL MIGHT HAPPEN: NOT AT ALL
1. FEELING NERVOUS, ANXIOUS, OR ON EDGE: SEVERAL DAYS
3. WORRYING TOO MUCH ABOUT DIFFERENT THINGS: NOT AT ALL
2. NOT BEING ABLE TO STOP OR CONTROL WORRYING: NOT AT ALL
6. BECOMING EASILY ANNOYED OR IRRITABLE: SEVERAL DAYS
4. TROUBLE RELAXING: NOT AT ALL
5. BEING SO RESTLESS THAT IT IS HARD TO SIT STILL: NOT AT ALL
IF YOU CHECKED OFF ANY PROBLEMS ON THIS QUESTIONNAIRE, HOW DIFFICULT HAVE THESE PROBLEMS MADE IT FOR YOU TO DO YOUR WORK, TAKE CARE OF THINGS AT HOME, OR GET ALONG WITH OTHER PEOPLE: SOMEWHAT DIFFICULT
GAD7 TOTAL SCORE: 2

## 2024-09-17 DIAGNOSIS — F41.1 GAD (GENERALIZED ANXIETY DISORDER): ICD-10-CM

## 2024-09-18 RX ORDER — HYDROXYZINE HYDROCHLORIDE 10 MG/1
10 TABLET, FILM COATED ORAL 3 TIMES DAILY PRN
Qty: 90 TABLET | Refills: 0 | Status: SHIPPED | OUTPATIENT
Start: 2024-09-18

## 2024-10-03 ENCOUNTER — HOSPITAL ENCOUNTER (OUTPATIENT)
Dept: WOMENS IMAGING | Age: 64
Discharge: HOME | End: 2024-10-03
Payer: COMMERCIAL

## 2024-10-03 VITALS — BODY MASS INDEX: 28.45 KG/M2 | WEIGHT: 177 LBS | HEIGHT: 66 IN

## 2024-10-03 DIAGNOSIS — Z12.31 VISIT FOR SCREENING MAMMOGRAM: ICD-10-CM

## 2024-10-03 PROCEDURE — 77063 BREAST TOMOSYNTHESIS BI: CPT

## 2024-10-07 ENCOUNTER — OFFICE VISIT (OUTPATIENT)
Dept: FAMILY MEDICINE CLINIC | Age: 64
End: 2024-10-07
Payer: COMMERCIAL

## 2024-10-07 VITALS
TEMPERATURE: 97.8 F | BODY MASS INDEX: 30.31 KG/M2 | WEIGHT: 188.6 LBS | DIASTOLIC BLOOD PRESSURE: 70 MMHG | HEIGHT: 66 IN | SYSTOLIC BLOOD PRESSURE: 122 MMHG

## 2024-10-07 DIAGNOSIS — L73.9 FOLLICULITIS OF AXILLA: Primary | ICD-10-CM

## 2024-10-07 PROCEDURE — 99213 OFFICE O/P EST LOW 20 MIN: CPT

## 2024-10-07 RX ORDER — DOXYCYCLINE HYCLATE 100 MG
100 TABLET ORAL 2 TIMES DAILY
Qty: 14 TABLET | Refills: 0 | Status: SHIPPED | OUTPATIENT
Start: 2024-10-07 | End: 2024-10-14

## 2024-10-07 SDOH — ECONOMIC STABILITY: FOOD INSECURITY: WITHIN THE PAST 12 MONTHS, YOU WORRIED THAT YOUR FOOD WOULD RUN OUT BEFORE YOU GOT MONEY TO BUY MORE.: NEVER TRUE

## 2024-10-07 SDOH — ECONOMIC STABILITY: FOOD INSECURITY: WITHIN THE PAST 12 MONTHS, THE FOOD YOU BOUGHT JUST DIDN'T LAST AND YOU DIDN'T HAVE MONEY TO GET MORE.: NEVER TRUE

## 2024-10-07 ASSESSMENT — ENCOUNTER SYMPTOMS
NAUSEA: 0
VOMITING: 0
APNEA: 0
CONSTIPATION: 0
WHEEZING: 0
DIARRHEA: 0
TROUBLE SWALLOWING: 0
COUGH: 0
BLOOD IN STOOL: 0
SHORTNESS OF BREATH: 0
COLOR CHANGE: 0
SINUS PRESSURE: 0
BACK PAIN: 0
SORE THROAT: 0
ABDOMINAL PAIN: 0

## 2024-10-07 NOTE — ASSESSMENT & PLAN NOTE
Infected follicle to left axilla. Will initiate doxy BID 7 days, continue antibiotic ointment daily. Recommend warm compresses 2-3x daily. Continue to monitor for worsening infection, fevers. If follicle inflammation worsens recommend ED eval for I&D. Pt voiced understanding

## 2024-10-07 NOTE — PROGRESS NOTES
intolerance.   Genitourinary:  Negative for difficulty urinating, dysuria, flank pain, frequency and urgency.   Musculoskeletal:  Negative for arthralgias, back pain, gait problem and myalgias.   Skin:  Positive for rash (folliculitis under left arm). Negative for color change and wound.   Neurological:  Negative for dizziness, weakness, light-headedness and headaches.   Hematological:  Does not bruise/bleed easily.   Psychiatric/Behavioral:  Negative for dysphoric mood and suicidal ideas. The patient is not nervous/anxious.        Vitals:    10/07/24 1316   BP: 122/70   Site: Left Upper Arm   Position: Sitting   Cuff Size: Medium Adult   Temp: 97.8 °F (36.6 °C)   TempSrc: Temporal   Weight: 85.5 kg (188 lb 9.6 oz)   Height: 1.676 m (5' 6\")       Physical Exam  Vitals reviewed.   Constitutional:       General: She is not in acute distress.     Appearance: Normal appearance.   HENT:      Head: Normocephalic.      Right Ear: External ear normal.      Left Ear: External ear normal.      Nose: Nose normal. No congestion.      Mouth/Throat:      Mouth: Mucous membranes are moist.   Eyes:      Extraocular Movements: Extraocular movements intact.      Pupils: Pupils are equal, round, and reactive to light.   Pulmonary:      Effort: Pulmonary effort is normal.      Breath sounds: No wheezing.   Abdominal:      General: Abdomen is flat.      Palpations: Abdomen is soft.   Musculoskeletal:         General: No swelling. Normal range of motion.      Cervical back: Normal range of motion.   Skin:     General: Skin is warm.      Coloration: Skin is not jaundiced.      Findings: Abscess (folliculitis to left armpit, red and tender) and erythema present. No rash or wound.   Neurological:      General: No focal deficit present.      Mental Status: She is alert and oriented to person, place, and time.   Psychiatric:         Mood and Affect: Mood normal.         Behavior: Behavior is cooperative.         Thought Content: Thought

## 2024-10-07 NOTE — PATIENT INSTRUCTIONS
Thank you for choosing [facility].    Please bring a current list of medications to every appointment.    Please contact your pharmacy for any prescription refill(s) that you are requesting.

## 2024-10-14 DIAGNOSIS — F41.1 GAD (GENERALIZED ANXIETY DISORDER): ICD-10-CM

## 2024-10-15 RX ORDER — HYDROXYZINE HYDROCHLORIDE 10 MG/1
10 TABLET, FILM COATED ORAL 3 TIMES DAILY PRN
Qty: 90 TABLET | Refills: 0 | Status: SHIPPED | OUTPATIENT
Start: 2024-10-15

## 2024-10-23 ENCOUNTER — OFFICE VISIT (OUTPATIENT)
Dept: FAMILY MEDICINE CLINIC | Age: 64
End: 2024-10-23
Payer: COMMERCIAL

## 2024-10-23 VITALS
WEIGHT: 191 LBS | BODY MASS INDEX: 30.7 KG/M2 | TEMPERATURE: 97.3 F | SYSTOLIC BLOOD PRESSURE: 132 MMHG | DIASTOLIC BLOOD PRESSURE: 84 MMHG | HEIGHT: 66 IN

## 2024-10-23 DIAGNOSIS — R59.0 LYMPHADENOPATHY, AXILLARY: Primary | ICD-10-CM

## 2024-10-23 PROCEDURE — 99213 OFFICE O/P EST LOW 20 MIN: CPT

## 2024-10-23 ASSESSMENT — ENCOUNTER SYMPTOMS
WHEEZING: 0
COLOR CHANGE: 0
ABDOMINAL PAIN: 0
VOMITING: 0
APNEA: 0
DIARRHEA: 0
SINUS PRESSURE: 0
NAUSEA: 0
BACK PAIN: 0
COUGH: 0
SHORTNESS OF BREATH: 0
TROUBLE SWALLOWING: 0
SORE THROAT: 0
BLOOD IN STOOL: 0
CONSTIPATION: 0

## 2024-10-23 NOTE — ASSESSMENT & PLAN NOTE
Right axilla found to have mild lympadenopathy on exam, tender with palpation. 2 lymph nodes notes. Folliculitis has cleared. Suspect possible residual from recent infection, or possibly related to stress. Recent mammo this month was negative which is reassuring. Recommend monitoring at home, if worsening or does not resolve can consider soft tissue US. Pt voiced understanding and agrees with plan

## 2024-10-23 NOTE — PROGRESS NOTES
Johana Magallon (:  1960) is a 64 y.o. female,Established patient, here for evaluation of the following chief complaint(s):  Arm Injury (Pt is back for a follow up on the ingrown hair under armpit. Pt states it feels like a cord under the skin)      ASSESSMENT/PLAN:  1. Lymphadenopathy, axillary  Assessment & Plan:    Right axilla found to have mild lympadenopathy on exam, tender with palpation. 2 lymph nodes notes. Folliculitis has cleared. Suspect possible residual from recent infection, or possibly related to stress. Recent mammo this month was negative which is reassuring. Recommend monitoring at home, if worsening or does not resolve can consider soft tissue US. Pt voiced understanding and agrees with plan      Return if symptoms worsen or fail to improve.    SUBJECTIVE/OBJECTIVE:    Johana presents today for follow up for folliculitis.     She was prescribed doxycyline for inflamed follicle under right armpit beginning of October.  She states the follicle infection has cleared.  She has noticed since then a tender, cordlike spot under right armpit.  Recent mammogram beginning of October normal, no evidence of malignancy  Date of last Mammogram: 10/3/2024    Under a lot of stress lately, daughter starting therapy for breast cancer.  Denies fever chills or recent illness.  No recent history of COVID.          Past Medical History:   Diagnosis Date    Allergic rhinitis     Anxiety disorder     Depression     HSV (herpes simplex virus) infection     IBS (irritable bowel syndrome)     SVT (supraventricular tachycardia) (HCC)     Stewart-Parkinson-White syndrome        Current Outpatient Medications   Medication Sig Dispense Refill    hydrOXYzine HCl (ATARAX) 10 MG tablet TAKE 1 TABLET BY MOUTH THREE TIMES DAILY AS NEEDED FOR ANXIETY 90 tablet 0    amphetamine-dextroamphetamine (ADDERALL XR) 25 MG extended release capsule Take 1 capsule by mouth daily for 30 days. Max Daily Amount: 25 mg 30 capsule 0    [START

## 2024-11-11 DIAGNOSIS — F41.1 GAD (GENERALIZED ANXIETY DISORDER): ICD-10-CM

## 2024-11-11 RX ORDER — HYDROXYZINE HYDROCHLORIDE 10 MG/1
10 TABLET, FILM COATED ORAL 3 TIMES DAILY PRN
Qty: 90 TABLET | Refills: 0 | Status: SHIPPED | OUTPATIENT
Start: 2024-11-11

## 2024-11-12 ENCOUNTER — TELEPHONE (OUTPATIENT)
Dept: FAMILY MEDICINE CLINIC | Age: 64
End: 2024-11-12

## 2024-11-12 DIAGNOSIS — F90.0 ADHD (ATTENTION DEFICIT HYPERACTIVITY DISORDER), INATTENTIVE TYPE: Primary | ICD-10-CM

## 2024-11-12 RX ORDER — DEXTROAMPHETAMINE SACCHARATE, AMPHETAMINE ASPARTATE, DEXTROAMPHETAMINE SULFATE AND AMPHETAMINE SULFATE 1.25; 1.25; 1.25; 1.25 MG/1; MG/1; MG/1; MG/1
5 TABLET ORAL 2 TIMES DAILY PRN
Qty: 60 TABLET | Refills: 0 | Status: SHIPPED | OUTPATIENT
Start: 2024-11-12 | End: 2024-12-12

## 2024-11-12 NOTE — TELEPHONE ENCOUNTER
Pt called requesting refill of the adderall 5 mg. To be called into [facility] [address] [place]

## 2024-12-05 RX ORDER — GABAPENTIN 300 MG/1
300 CAPSULE ORAL 3 TIMES DAILY
Qty: 90 CAPSULE | Refills: 3 | Status: SHIPPED | OUTPATIENT
Start: 2024-12-05 | End: 2025-04-04

## 2024-12-06 DIAGNOSIS — F90.0 ADHD (ATTENTION DEFICIT HYPERACTIVITY DISORDER), INATTENTIVE TYPE: ICD-10-CM

## 2024-12-07 RX ORDER — DEXTROAMPHETAMINE SACCHARATE, AMPHETAMINE ASPARTATE MONOHYDRATE, DEXTROAMPHETAMINE SULFATE AND AMPHETAMINE SULFATE 6.25; 6.25; 6.25; 6.25 MG/1; MG/1; MG/1; MG/1
25 CAPSULE, EXTENDED RELEASE ORAL DAILY
Qty: 30 CAPSULE | Refills: 0 | Status: SHIPPED | OUTPATIENT
Start: 2024-12-07 | End: 2025-01-06

## 2024-12-23 DIAGNOSIS — F41.1 GAD (GENERALIZED ANXIETY DISORDER): ICD-10-CM

## 2024-12-23 DIAGNOSIS — F33.2 SEVERE EPISODE OF RECURRENT MAJOR DEPRESSIVE DISORDER, WITHOUT PSYCHOTIC FEATURES (HCC): ICD-10-CM

## 2024-12-23 DIAGNOSIS — G47.9 SLEEP DIFFICULTIES: ICD-10-CM

## 2024-12-23 RX ORDER — QUETIAPINE FUMARATE 25 MG/1
TABLET, FILM COATED ORAL
Qty: 60 TABLET | Refills: 3 | Status: SHIPPED | OUTPATIENT
Start: 2024-12-23

## 2025-01-10 ENCOUNTER — OFFICE VISIT (OUTPATIENT)
Dept: PSYCHIATRY | Age: 65
End: 2025-01-10
Payer: COMMERCIAL

## 2025-01-10 VITALS
BODY MASS INDEX: 31.18 KG/M2 | HEIGHT: 66 IN | WEIGHT: 194 LBS | DIASTOLIC BLOOD PRESSURE: 82 MMHG | SYSTOLIC BLOOD PRESSURE: 126 MMHG

## 2025-01-10 DIAGNOSIS — F90.0 ADHD (ATTENTION DEFICIT HYPERACTIVITY DISORDER), INATTENTIVE TYPE: ICD-10-CM

## 2025-01-10 DIAGNOSIS — F33.1 MDD (MAJOR DEPRESSIVE DISORDER), RECURRENT EPISODE, MODERATE (HCC): Primary | ICD-10-CM

## 2025-01-10 PROCEDURE — 99214 OFFICE O/P EST MOD 30 MIN: CPT | Performed by: NURSE PRACTITIONER

## 2025-01-10 RX ORDER — ESCITALOPRAM OXALATE 5 MG/1
5 TABLET ORAL DAILY
Qty: 30 TABLET | Refills: 3 | Status: SHIPPED | OUTPATIENT
Start: 2025-01-10

## 2025-01-10 RX ORDER — DEXTROAMPHETAMINE SACCHARATE, AMPHETAMINE ASPARTATE MONOHYDRATE, DEXTROAMPHETAMINE SULFATE AND AMPHETAMINE SULFATE 6.25; 6.25; 6.25; 6.25 MG/1; MG/1; MG/1; MG/1
25 CAPSULE, EXTENDED RELEASE ORAL DAILY
Qty: 30 CAPSULE | Refills: 0 | Status: SHIPPED | OUTPATIENT
Start: 2025-03-11 | End: 2025-04-10

## 2025-01-10 RX ORDER — DEXTROAMPHETAMINE SACCHARATE, AMPHETAMINE ASPARTATE, DEXTROAMPHETAMINE SULFATE AND AMPHETAMINE SULFATE 1.25; 1.25; 1.25; 1.25 MG/1; MG/1; MG/1; MG/1
5 TABLET ORAL 2 TIMES DAILY PRN
Qty: 60 TABLET | Refills: 0 | Status: SHIPPED | OUTPATIENT
Start: 2025-01-10 | End: 2025-02-09

## 2025-01-10 RX ORDER — DEXTROAMPHETAMINE SACCHARATE, AMPHETAMINE ASPARTATE MONOHYDRATE, DEXTROAMPHETAMINE SULFATE AND AMPHETAMINE SULFATE 6.25; 6.25; 6.25; 6.25 MG/1; MG/1; MG/1; MG/1
25 CAPSULE, EXTENDED RELEASE ORAL DAILY
Qty: 30 CAPSULE | Refills: 0 | Status: SHIPPED | OUTPATIENT
Start: 2025-02-09 | End: 2025-03-11

## 2025-01-10 RX ORDER — DEXTROAMPHETAMINE SACCHARATE, AMPHETAMINE ASPARTATE MONOHYDRATE, DEXTROAMPHETAMINE SULFATE AND AMPHETAMINE SULFATE 6.25; 6.25; 6.25; 6.25 MG/1; MG/1; MG/1; MG/1
25 CAPSULE, EXTENDED RELEASE ORAL DAILY
Qty: 30 CAPSULE | Refills: 0 | Status: SHIPPED | OUTPATIENT
Start: 2025-01-10 | End: 2025-02-09

## 2025-01-10 ASSESSMENT — PATIENT HEALTH QUESTIONNAIRE - PHQ9
8. MOVING OR SPEAKING SO SLOWLY THAT OTHER PEOPLE COULD HAVE NOTICED. OR THE OPPOSITE, BEING SO FIGETY OR RESTLESS THAT YOU HAVE BEEN MOVING AROUND A LOT MORE THAN USUAL: SEVERAL DAYS
SUM OF ALL RESPONSES TO PHQ QUESTIONS 1-9: 18
1. LITTLE INTEREST OR PLEASURE IN DOING THINGS: NEARLY EVERY DAY
5. POOR APPETITE OR OVEREATING: NEARLY EVERY DAY
SUM OF ALL RESPONSES TO PHQ QUESTIONS 1-9: 18
10. IF YOU CHECKED OFF ANY PROBLEMS, HOW DIFFICULT HAVE THESE PROBLEMS MADE IT FOR YOU TO DO YOUR WORK, TAKE CARE OF THINGS AT HOME, OR GET ALONG WITH OTHER PEOPLE: VERY DIFFICULT
3. TROUBLE FALLING OR STAYING ASLEEP: NEARLY EVERY DAY
6. FEELING BAD ABOUT YOURSELF - OR THAT YOU ARE A FAILURE OR HAVE LET YOURSELF OR YOUR FAMILY DOWN: MORE THAN HALF THE DAYS
4. FEELING TIRED OR HAVING LITTLE ENERGY: NEARLY EVERY DAY
SUM OF ALL RESPONSES TO PHQ QUESTIONS 1-9: 18
9. THOUGHTS THAT YOU WOULD BE BETTER OFF DEAD, OR OF HURTING YOURSELF: NOT AT ALL
SUM OF ALL RESPONSES TO PHQ9 QUESTIONS 1 & 2: 5
SUM OF ALL RESPONSES TO PHQ QUESTIONS 1-9: 18
2. FEELING DOWN, DEPRESSED OR HOPELESS: MORE THAN HALF THE DAYS
7. TROUBLE CONCENTRATING ON THINGS, SUCH AS READING THE NEWSPAPER OR WATCHING TELEVISION: SEVERAL DAYS

## 2025-01-10 ASSESSMENT — ANXIETY QUESTIONNAIRES
4. TROUBLE RELAXING: 2-OVER HALF THE DAYS
5. BEING SO RESTLESS THAT IT IS HARD TO SIT STILL: 0-NOT AT ALL
GAD7 TOTAL SCORE: 16
7. FEELING AFRAID AS IF SOMETHING AWFUL MIGHT HAPPEN: 3-NEARLY EVERY DAY
1. FEELING NERVOUS, ANXIOUS, OR ON EDGE: NEARLY EVERY DAY
6. BECOMING EASILY ANNOYED OR IRRITABLE: 2-OVER HALF THE DAYS
2. NOT BEING ABLE TO STOP OR CONTROL WORRYING: 3-NEARLY EVERY DAY
3. WORRYING TOO MUCH ABOUT DIFFERENT THINGS: 3-NEARLY EVERY DAY

## 2025-01-10 ASSESSMENT — COLUMBIA-SUICIDE SEVERITY RATING SCALE - C-SSRS
2. HAVE YOU ACTUALLY HAD ANY THOUGHTS OF KILLING YOURSELF?: NO
6. HAVE YOU EVER DONE ANYTHING, STARTED TO DO ANYTHING, OR PREPARED TO DO ANYTHING TO END YOUR LIFE?: NO
1. WITHIN THE PAST MONTH, HAVE YOU WISHED YOU WERE DEAD OR WISHED YOU COULD GO TO SLEEP AND NOT WAKE UP?: NO

## 2025-01-10 NOTE — PROGRESS NOTES
PSYCHIATRY PROGRESS NOTE    Johana Magallon  1960  01/10/25    CC:   Chief Complaint   Patient presents with    Follow-up     Referred by Albania Singh APRN - NP.      Diagnosis Orders   1. MDD (major depressive disorder), recurrent episode, moderate (HCC)  escitalopram (LEXAPRO) 5 MG tablet      2. ADHD (attention deficit hyperactivity disorder), inattentive type  amphetamine-dextroamphetamine (ADDERALL XR) 25 MG extended release capsule    amphetamine-dextroamphetamine (ADDERALL XR) 25 MG extended release capsule    amphetamine-dextroamphetamine (ADDERALL XR) 25 MG extended release capsule    amphetamine-dextroamphetamine (ADDERALL, 5MG,) 5 MG tablet            Assessment & Plan:      Psychiatric Assessment  Patient presentation today indicative of Depression, Anxiety, ADHD. Depression and Anxiety worsening with diagnosis of breast cancer of her daughter. Will trial Lexapro for depression.      2. Pharmacology     - Continue Seroquel 25 mg 1-2 tablets qhs. Patient has been prescribed Quetiapine. Discussed with patient risks, benefits, side effects & adverse events of medication, including but not limited to orthostasis/HTN, drowsiness, headache, agitation, dizziness, fatigue, EPS, weight gain, metabolic syndrome, dry mouth, increased appetite, constipation, and suicidal ideation.  Also discussed need for LFT, EKG, lipid monitoring. Patient verbalized understanding. Will monitor for AE/SEs at each appointment.     - Continue Hydroxyzine 10 mg TID PRN. Patient has been prescribed an antihistamine (Diphenhydramine, Hydroxyzine, etc.). Discussed with patient risks, benefits, side effects & adverse events of medication, including but not limited to drowsiness, hypotension, palpitations, ataxia, confusion, dizziness. Patient verbalized understanding. Will monitor for AE/SEs at each appointment.     - Continue Adderall 25 mg XR once daily. Continue Adderall 5 mg IR booster. Patient has been prescribed a

## 2025-01-17 RX ORDER — ALENDRONATE SODIUM 70 MG/1
70 TABLET ORAL
Qty: 12 TABLET | Refills: 0 | Status: SHIPPED | OUTPATIENT
Start: 2025-01-17

## 2025-01-17 SDOH — ECONOMIC STABILITY: FOOD INSECURITY: WITHIN THE PAST 12 MONTHS, THE FOOD YOU BOUGHT JUST DIDN'T LAST AND YOU DIDN'T HAVE MONEY TO GET MORE.: NEVER TRUE

## 2025-01-17 SDOH — ECONOMIC STABILITY: INCOME INSECURITY: IN THE LAST 12 MONTHS, WAS THERE A TIME WHEN YOU WERE NOT ABLE TO PAY THE MORTGAGE OR RENT ON TIME?: NO

## 2025-01-17 SDOH — ECONOMIC STABILITY: FOOD INSECURITY: WITHIN THE PAST 12 MONTHS, YOU WORRIED THAT YOUR FOOD WOULD RUN OUT BEFORE YOU GOT MONEY TO BUY MORE.: NEVER TRUE

## 2025-01-20 ENCOUNTER — OFFICE VISIT (OUTPATIENT)
Dept: FAMILY MEDICINE CLINIC | Age: 65
End: 2025-01-20
Payer: COMMERCIAL

## 2025-01-20 VITALS
BODY MASS INDEX: 30.67 KG/M2 | SYSTOLIC BLOOD PRESSURE: 122 MMHG | WEIGHT: 190.8 LBS | HEIGHT: 66 IN | TEMPERATURE: 97.3 F | DIASTOLIC BLOOD PRESSURE: 82 MMHG

## 2025-01-20 DIAGNOSIS — G44.89 OTHER HEADACHE SYNDROME: Primary | ICD-10-CM

## 2025-01-20 DIAGNOSIS — B02.8 HERPES ZOSTER WITH OTHER COMPLICATION: ICD-10-CM

## 2025-01-20 PROCEDURE — 99213 OFFICE O/P EST LOW 20 MIN: CPT | Performed by: NURSE PRACTITIONER

## 2025-01-20 RX ORDER — ACYCLOVIR 800 MG/1
800 TABLET ORAL 2 TIMES DAILY
Qty: 20 TABLET | Refills: 0 | Status: SHIPPED | OUTPATIENT
Start: 2025-01-20 | End: 2025-01-30

## 2025-01-20 RX ORDER — PREDNISONE 20 MG/1
20 TABLET ORAL DAILY
Qty: 10 TABLET | Refills: 0 | Status: SHIPPED | OUTPATIENT
Start: 2025-01-20 | End: 2025-01-30

## 2025-01-20 RX ORDER — IBUPROFEN 800 MG/1
800 TABLET, FILM COATED ORAL EVERY 8 HOURS PRN
Qty: 30 TABLET | Refills: 0 | Status: CANCELLED | OUTPATIENT
Start: 2025-01-20 | End: 2025-01-30

## 2025-01-20 ASSESSMENT — ENCOUNTER SYMPTOMS
RESPIRATORY NEGATIVE: 1
GASTROINTESTINAL NEGATIVE: 1

## 2025-01-20 NOTE — PROGRESS NOTES
Johana Magallon (:  1960) is a 64 y.o. female,Established patient, here for evaluation of the following chief complaint(s):  Headache (Patient complains of headaches regularly. Patient feels most pressure in right eye area x 2 months. )      Assessment & Plan   ASSESSMENT/PLAN:  1. Other headache syndrome  Not controlled. May be related to shingles.   - predniSONE (DELTASONE) 20 MG tablet; Take 1 tablet by mouth daily for 10 days  Dispense: 10 tablet; Refill: 0    2. Herpes zoster with other complication  New. Hx of, start meds today, follow up with ophthalmologist if symptoms persist  - acyclovir (ZOVIRAX) 800 MG tablet; Take 1 tablet by mouth 2 times daily for 10 days  Dispense: 20 tablet; Refill: 0  - predniSONE (DELTASONE) 20 MG tablet; Take 1 tablet by mouth daily for 10 days  Dispense: 10 tablet; Refill: 0       Return if symptoms worsen or fail to improve.         Subjective   SUBJECTIVE/OBJECTIVE:  HPI  Presents today for headache and pressure around right eye for 2 months.   Hx of Shingles to right side of face, thinks it may be trying to come back. Discomfort to eye and right side of face never fully went away. States she has had shingles 3 times in the past and that is why she is on gabapentin. Follow up appt to ophthalmologist he told her he could see where the shingles attacked her optical nerve. Some vision change in right eye, not worsening. Denies unilateral weakness/numbness, balance concerns or slurred speech.     Current Outpatient Medications   Medication Sig Dispense Refill    acyclovir (ZOVIRAX) 800 MG tablet Take 1 tablet by mouth 2 times daily for 10 days 20 tablet 0    predniSONE (DELTASONE) 20 MG tablet Take 1 tablet by mouth daily for 10 days 10 tablet 0    alendronate (FOSAMAX) 70 MG tablet TAKE 1 TABLET BY MOUTH EVERY 7 DAYS 12 tablet 0    amphetamine-dextroamphetamine (ADDERALL XR) 25 MG extended release capsule Take 1 capsule by mouth daily for 30 days. Max Daily Amount: 25

## 2025-01-22 RX ORDER — ATORVASTATIN CALCIUM 10 MG/1
10 TABLET, FILM COATED ORAL DAILY
Qty: 90 TABLET | Refills: 0 | Status: SHIPPED | OUTPATIENT
Start: 2025-01-22

## 2025-01-31 ENCOUNTER — OFFICE VISIT (OUTPATIENT)
Dept: FAMILY MEDICINE CLINIC | Age: 65
End: 2025-01-31

## 2025-01-31 VITALS
BODY MASS INDEX: 33.15 KG/M2 | HEIGHT: 65 IN | SYSTOLIC BLOOD PRESSURE: 128 MMHG | WEIGHT: 199 LBS | TEMPERATURE: 98.1 F | DIASTOLIC BLOOD PRESSURE: 80 MMHG

## 2025-01-31 DIAGNOSIS — Z86.19 HISTORY OF SHINGLES: Primary | ICD-10-CM

## 2025-01-31 DIAGNOSIS — H53.9 CHANGE IN VISION: ICD-10-CM

## 2025-01-31 RX ORDER — MUPIROCIN 20 MG/G
OINTMENT TOPICAL
Qty: 15 G | Refills: 0 | Status: SHIPPED | OUTPATIENT
Start: 2025-01-31 | End: 2025-02-07

## 2025-01-31 NOTE — PROGRESS NOTES
Subjective:      Patient ID: Johana Magallon is a 64 y.o. female.    Chief Complaint   Patient presents with    Follow-up     Shingles - new spot on face        Patient presents with:  Follow-up: Shingles - new spot on face    Here for the above   Started with shingle 3 week ago   She had issue with on the right face   About the eye  It is better  But she did have some blurring of the vision with this and it has persisted    She had a area pop up on the left chin for 4 days     YOB: 1960    Date of Visit:  1/31/2025     -- Dye [Iodides] -- Anaphylaxis and Shortness Of Breath    --  With shellfish in hives   -- Shellfish-Derived Products -- Anaphylaxis   -- Shrimp (Diagnostic) -- Anaphylaxis   -- Bactrim [Sulfamethoxazole-Trimethoprim]     --  Blisters,skin burns   -- Paxil [Paroxetine] -- Other (See Comments)    --  hallucuinations   -- Pcn [Penicillins]     --  Has never taken but stays away from due to             cephalexin and keflex   -- Cephalexin -- Rash   -- Clindamycin/Lincomycin Cross Reactors -- Rash   -- Nortriptyline -- Rash   -- Sertraline -- Rash    --  Skin peels   -- Sulfa Antibiotics -- Rash    --  Bactrim-burns skin    Current Outpatient Medications:  atorvastatin (LIPITOR) 10 MG tablet, TAKE 1 TABLET BY MOUTH DAILY, Disp: 90 tablet, Rfl: 0  alendronate (FOSAMAX) 70 MG tablet, TAKE 1 TABLET BY MOUTH EVERY 7 DAYS, Disp: 12 tablet, Rfl: 0  amphetamine-dextroamphetamine (ADDERALL XR) 25 MG extended release capsule, Take 1 capsule by mouth daily for 30 days. Max Daily Amount: 25 mg, Disp: 30 capsule, Rfl: 0  [START ON 2/9/2025] amphetamine-dextroamphetamine (ADDERALL XR) 25 MG extended release capsule, Take 1 capsule by mouth daily for 30 days. Max Daily Amount: 25 mg, Disp: 30 capsule, Rfl: 0  [START ON 3/11/2025] amphetamine-dextroamphetamine (ADDERALL XR) 25 MG extended release capsule, Take 1 capsule by mouth daily for 30 days. Max Daily Amount: 25 mg, Disp: 30 capsule, Rfl:

## 2025-01-31 NOTE — PATIENT INSTRUCTIONS
See dr prasad or associate for the vision change   Let them know you had shingles   Use the cream on the skin area on the left   The skin area should heal completely   Call if not resolved

## 2025-03-19 RX ORDER — ALENDRONATE SODIUM 70 MG/1
70 TABLET ORAL
Qty: 12 TABLET | Refills: 0 | Status: SHIPPED | OUTPATIENT
Start: 2025-03-19

## 2025-04-07 RX ORDER — GABAPENTIN 300 MG/1
300 CAPSULE ORAL 3 TIMES DAILY
Qty: 90 CAPSULE | Refills: 3 | Status: SHIPPED | OUTPATIENT
Start: 2025-04-07 | End: 2025-08-05

## 2025-04-11 ENCOUNTER — OFFICE VISIT (OUTPATIENT)
Dept: PSYCHIATRY | Age: 65
End: 2025-04-11
Payer: COMMERCIAL

## 2025-04-11 VITALS
DIASTOLIC BLOOD PRESSURE: 76 MMHG | BODY MASS INDEX: 32.32 KG/M2 | WEIGHT: 194 LBS | SYSTOLIC BLOOD PRESSURE: 124 MMHG | HEIGHT: 65 IN

## 2025-04-11 DIAGNOSIS — F33.1 MDD (MAJOR DEPRESSIVE DISORDER), RECURRENT EPISODE, MODERATE (HCC): ICD-10-CM

## 2025-04-11 DIAGNOSIS — F41.1 GAD (GENERALIZED ANXIETY DISORDER): ICD-10-CM

## 2025-04-11 DIAGNOSIS — F90.0 ADHD (ATTENTION DEFICIT HYPERACTIVITY DISORDER), INATTENTIVE TYPE: Primary | ICD-10-CM

## 2025-04-11 LAB
ALBUMIN SERPL-MCNC: 4.6 G/DL (ref 3.4–5)
ALBUMIN/GLOB SERPL: 2 {RATIO} (ref 1.1–2.2)
ALP SERPL-CCNC: 77 U/L (ref 40–129)
ALT SERPL-CCNC: 31 U/L (ref 10–40)
ANION GAP SERPL CALCULATED.3IONS-SCNC: 12 MMOL/L (ref 3–16)
AST SERPL-CCNC: 39 U/L (ref 15–37)
BILIRUB DIRECT SERPL-MCNC: <0.1 MG/DL (ref 0–0.3)
BILIRUB INDIRECT SERPL-MCNC: 0.3 MG/DL (ref 0–1)
BILIRUB SERPL-MCNC: 0.4 MG/DL (ref 0–1)
BUN SERPL-MCNC: 6 MG/DL (ref 7–20)
CALCIUM SERPL-MCNC: 9.5 MG/DL (ref 8.3–10.6)
CHLORIDE SERPL-SCNC: 104 MMOL/L (ref 99–110)
CHOLEST SERPL-MCNC: 203 MG/DL (ref 0–199)
CO2 SERPL-SCNC: 25 MMOL/L (ref 21–32)
CREAT SERPL-MCNC: 0.7 MG/DL (ref 0.6–1.2)
DEPRECATED RDW RBC AUTO: 14.2 % (ref 12.4–15.4)
GFR SERPLBLD CREATININE-BSD FMLA CKD-EPI: >90 ML/MIN/{1.73_M2}
GLUCOSE SERPL-MCNC: 95 MG/DL (ref 70–99)
HCT VFR BLD AUTO: 38.9 % (ref 36–48)
HDLC SERPL-MCNC: 60 MG/DL (ref 40–60)
HGB BLD-MCNC: 13.2 G/DL (ref 12–16)
LDLC SERPL CALC-MCNC: 108 MG/DL
MCH RBC QN AUTO: 30.7 PG (ref 26–34)
MCHC RBC AUTO-ENTMCNC: 34 G/DL (ref 31–36)
MCV RBC AUTO: 90.4 FL (ref 80–100)
PLATELET # BLD AUTO: 252 K/UL (ref 135–450)
PMV BLD AUTO: 8.9 FL (ref 5–10.5)
POTASSIUM SERPL-SCNC: 4.9 MMOL/L (ref 3.5–5.1)
PROT SERPL-MCNC: 6.9 G/DL (ref 6.4–8.2)
RBC # BLD AUTO: 4.3 M/UL (ref 4–5.2)
SODIUM SERPL-SCNC: 141 MMOL/L (ref 136–145)
TRIGL SERPL-MCNC: 176 MG/DL (ref 0–150)
TSH SERPL DL<=0.005 MIU/L-ACNC: 1.25 UIU/ML (ref 0.27–4.2)
VLDLC SERPL CALC-MCNC: 35 MG/DL
WBC # BLD AUTO: 6.6 K/UL (ref 4–11)

## 2025-04-11 PROCEDURE — 99214 OFFICE O/P EST MOD 30 MIN: CPT | Performed by: NURSE PRACTITIONER

## 2025-04-11 RX ORDER — DEXTROAMPHETAMINE SACCHARATE, AMPHETAMINE ASPARTATE MONOHYDRATE, DEXTROAMPHETAMINE SULFATE AND AMPHETAMINE SULFATE 6.25; 6.25; 6.25; 6.25 MG/1; MG/1; MG/1; MG/1
25 CAPSULE, EXTENDED RELEASE ORAL DAILY
Qty: 30 CAPSULE | Refills: 0 | Status: SHIPPED | OUTPATIENT
Start: 2025-04-11 | End: 2025-05-11

## 2025-04-11 RX ORDER — DEXTROAMPHETAMINE SACCHARATE, AMPHETAMINE ASPARTATE MONOHYDRATE, DEXTROAMPHETAMINE SULFATE AND AMPHETAMINE SULFATE 6.25; 6.25; 6.25; 6.25 MG/1; MG/1; MG/1; MG/1
25 CAPSULE, EXTENDED RELEASE ORAL DAILY
Qty: 30 CAPSULE | Refills: 0 | Status: SHIPPED | OUTPATIENT
Start: 2025-06-10 | End: 2025-07-10

## 2025-04-11 RX ORDER — DEXTROAMPHETAMINE SACCHARATE, AMPHETAMINE ASPARTATE, DEXTROAMPHETAMINE SULFATE AND AMPHETAMINE SULFATE 1.25; 1.25; 1.25; 1.25 MG/1; MG/1; MG/1; MG/1
5 TABLET ORAL 2 TIMES DAILY PRN
Qty: 60 TABLET | Refills: 0 | Status: SHIPPED | OUTPATIENT
Start: 2025-04-11 | End: 2025-05-11

## 2025-04-11 RX ORDER — DEXTROAMPHETAMINE SACCHARATE, AMPHETAMINE ASPARTATE MONOHYDRATE, DEXTROAMPHETAMINE SULFATE AND AMPHETAMINE SULFATE 6.25; 6.25; 6.25; 6.25 MG/1; MG/1; MG/1; MG/1
25 CAPSULE, EXTENDED RELEASE ORAL DAILY
Qty: 30 CAPSULE | Refills: 0 | Status: SHIPPED | OUTPATIENT
Start: 2025-05-11 | End: 2025-06-10

## 2025-04-11 RX ORDER — ESCITALOPRAM OXALATE 5 MG/1
5 TABLET ORAL DAILY
Qty: 30 TABLET | Refills: 3 | Status: SHIPPED | OUTPATIENT
Start: 2025-04-11

## 2025-04-11 ASSESSMENT — ANXIETY QUESTIONNAIRES
4. TROUBLE RELAXING: SEVERAL DAYS
2. NOT BEING ABLE TO STOP OR CONTROL WORRYING: SEVERAL DAYS
7. FEELING AFRAID AS IF SOMETHING AWFUL MIGHT HAPPEN: NOT AT ALL
6. BECOMING EASILY ANNOYED OR IRRITABLE: SEVERAL DAYS
GAD7 TOTAL SCORE: 6
5. BEING SO RESTLESS THAT IT IS HARD TO SIT STILL: NOT AT ALL
IF YOU CHECKED OFF ANY PROBLEMS ON THIS QUESTIONNAIRE, HOW DIFFICULT HAVE THESE PROBLEMS MADE IT FOR YOU TO DO YOUR WORK, TAKE CARE OF THINGS AT HOME, OR GET ALONG WITH OTHER PEOPLE: SOMEWHAT DIFFICULT
3. WORRYING TOO MUCH ABOUT DIFFERENT THINGS: SEVERAL DAYS
1. FEELING NERVOUS, ANXIOUS, OR ON EDGE: MORE THAN HALF THE DAYS

## 2025-04-11 ASSESSMENT — PATIENT HEALTH QUESTIONNAIRE - PHQ9
3. TROUBLE FALLING OR STAYING ASLEEP: NEARLY EVERY DAY
SUM OF ALL RESPONSES TO PHQ QUESTIONS 1-9: 11
8. MOVING OR SPEAKING SO SLOWLY THAT OTHER PEOPLE COULD HAVE NOTICED. OR THE OPPOSITE, BEING SO FIGETY OR RESTLESS THAT YOU HAVE BEEN MOVING AROUND A LOT MORE THAN USUAL: SEVERAL DAYS
10. IF YOU CHECKED OFF ANY PROBLEMS, HOW DIFFICULT HAVE THESE PROBLEMS MADE IT FOR YOU TO DO YOUR WORK, TAKE CARE OF THINGS AT HOME, OR GET ALONG WITH OTHER PEOPLE: SOMEWHAT DIFFICULT
SUM OF ALL RESPONSES TO PHQ QUESTIONS 1-9: 11
7. TROUBLE CONCENTRATING ON THINGS, SUCH AS READING THE NEWSPAPER OR WATCHING TELEVISION: SEVERAL DAYS
9. THOUGHTS THAT YOU WOULD BE BETTER OFF DEAD, OR OF HURTING YOURSELF: NOT AT ALL
SUM OF ALL RESPONSES TO PHQ QUESTIONS 1-9: 11
4. FEELING TIRED OR HAVING LITTLE ENERGY: MORE THAN HALF THE DAYS
1. LITTLE INTEREST OR PLEASURE IN DOING THINGS: SEVERAL DAYS
2. FEELING DOWN, DEPRESSED OR HOPELESS: SEVERAL DAYS
5. POOR APPETITE OR OVEREATING: MORE THAN HALF THE DAYS
SUM OF ALL RESPONSES TO PHQ QUESTIONS 1-9: 11
6. FEELING BAD ABOUT YOURSELF - OR THAT YOU ARE A FAILURE OR HAVE LET YOURSELF OR YOUR FAMILY DOWN: NOT AT ALL

## 2025-04-11 NOTE — PROGRESS NOTES
PSYCHIATRY PROGRESS NOTE    Johana Magallon  1960 04/11/25    CC:   Chief Complaint   Patient presents with    Follow-up     Referred by Albania Singh APRN - NP.      Diagnosis Orders   1. ADHD (attention deficit hyperactivity disorder), inattentive type  amphetamine-dextroamphetamine (ADDERALL XR) 25 MG extended release capsule    amphetamine-dextroamphetamine (ADDERALL XR) 25 MG extended release capsule    amphetamine-dextroamphetamine (ADDERALL XR) 25 MG extended release capsule    amphetamine-dextroamphetamine (ADDERALL, 5MG,) 5 MG tablet      2. MDD (major depressive disorder), recurrent episode, moderate (HCC)  escitalopram (LEXAPRO) 5 MG tablet    Comprehensive Metabolic Panel    CBC    TSH    Hemoglobin A1C    LIPID PANEL    Hepatic Function Panel    LIPID PANEL    Hemoglobin A1C    TSH    CBC    Comprehensive Metabolic Panel    Hepatic Function Panel      3. LUIZ (generalized anxiety disorder)              Assessment & Plan:      Psychiatric Assessment  Patient presentation today indicative of Depression, Anxiety, ADHD. Since last evaluation, patient displaying improvements of depression and anxiety. Likely due to additional Lexapro along with processing of her daughters cancer.      2. Pharmacology     - Continue Seroquel 25 mg 1-2 tablets qhs. Patient has been prescribed Quetiapine. Discussed with patient risks, benefits, side effects & adverse events of medication, including but not limited to orthostasis/HTN, drowsiness, headache, agitation, dizziness, fatigue, EPS, weight gain, metabolic syndrome, dry mouth, increased appetite, constipation, and suicidal ideation.  Also discussed need for LFT, EKG, lipid monitoring. Patient verbalized understanding. Will monitor for AE/SEs at each appointment.      - Continue Hydroxyzine 10 mg TID PRN. Patient has been prescribed an antihistamine (Diphenhydramine, Hydroxyzine, etc.). Discussed with patient risks, benefits, side effects & adverse events

## 2025-04-12 LAB
EST. AVERAGE GLUCOSE BLD GHB EST-MCNC: 108.3 MG/DL
HBA1C MFR BLD: 5.4 %

## 2025-04-14 DIAGNOSIS — F41.1 GAD (GENERALIZED ANXIETY DISORDER): ICD-10-CM

## 2025-04-14 DIAGNOSIS — G47.9 SLEEP DIFFICULTIES: ICD-10-CM

## 2025-04-14 DIAGNOSIS — F33.2 SEVERE EPISODE OF RECURRENT MAJOR DEPRESSIVE DISORDER, WITHOUT PSYCHOTIC FEATURES (HCC): ICD-10-CM

## 2025-04-15 RX ORDER — QUETIAPINE FUMARATE 25 MG/1
TABLET, FILM COATED ORAL
Qty: 60 TABLET | Refills: 3 | Status: SHIPPED | OUTPATIENT
Start: 2025-04-15

## 2025-04-21 RX ORDER — ATORVASTATIN CALCIUM 10 MG/1
10 TABLET, FILM COATED ORAL DAILY
Qty: 90 TABLET | Refills: 3 | Status: SHIPPED | OUTPATIENT
Start: 2025-04-21

## 2025-04-25 ENCOUNTER — OFFICE VISIT (OUTPATIENT)
Dept: FAMILY MEDICINE CLINIC | Age: 65
End: 2025-04-25
Payer: COMMERCIAL

## 2025-04-25 VITALS
OXYGEN SATURATION: 98 % | WEIGHT: 201.4 LBS | DIASTOLIC BLOOD PRESSURE: 78 MMHG | TEMPERATURE: 97.5 F | RESPIRATION RATE: 18 BRPM | HEART RATE: 79 BPM | HEIGHT: 65 IN | BODY MASS INDEX: 33.55 KG/M2 | SYSTOLIC BLOOD PRESSURE: 130 MMHG

## 2025-04-25 DIAGNOSIS — M85.89 OSTEOPENIA OF MULTIPLE SITES: ICD-10-CM

## 2025-04-25 DIAGNOSIS — F32.A DEPRESSION, UNSPECIFIED DEPRESSION TYPE: ICD-10-CM

## 2025-04-25 DIAGNOSIS — E78.2 MIXED HYPERLIPIDEMIA: ICD-10-CM

## 2025-04-25 DIAGNOSIS — I45.6 WPW (WOLFF-PARKINSON-WHITE SYNDROME): Primary | ICD-10-CM

## 2025-04-25 DIAGNOSIS — Z12.11 SCREEN FOR COLON CANCER: ICD-10-CM

## 2025-04-25 PROBLEM — L73.9 FOLLICULITIS OF AXILLA: Status: RESOLVED | Noted: 2024-10-07 | Resolved: 2025-04-25

## 2025-04-25 PROBLEM — M79.644 PAIN OF RIGHT THUMB: Status: RESOLVED | Noted: 2018-03-07 | Resolved: 2025-04-25

## 2025-04-25 PROBLEM — M25.40 JOINT SWELLING: Status: RESOLVED | Noted: 2024-04-29 | Resolved: 2025-04-25

## 2025-04-25 PROCEDURE — 99214 OFFICE O/P EST MOD 30 MIN: CPT

## 2025-04-25 RX ORDER — ALENDRONATE SODIUM 70 MG/1
70 TABLET ORAL
Qty: 12 TABLET | Refills: 2 | Status: SHIPPED | OUTPATIENT
Start: 2025-04-25

## 2025-04-25 ASSESSMENT — ENCOUNTER SYMPTOMS
COUGH: 0
COLOR CHANGE: 0
BACK PAIN: 0
SHORTNESS OF BREATH: 0
WHEEZING: 0
APNEA: 0
TROUBLE SWALLOWING: 0
BLOOD IN STOOL: 0
ABDOMINAL PAIN: 0
CONSTIPATION: 0
DIARRHEA: 0
SINUS PRESSURE: 0
SORE THROAT: 0
NAUSEA: 0
VOMITING: 0

## 2025-04-25 NOTE — ASSESSMENT & PLAN NOTE
Patient diagnosed with WPW several years ago, has been stable for many years.  She has noticed just this past year more increase in episodes of tachycardia and dizziness.  Labs from 4/11 reviewed, thyroid level stable, electrolytes within normal limits.  Heart rate RRR in office at this time, recommend consult with cardiology to ensure no further treatment is warranted for condition given an increase in episodes.  Patient voiced understanding.

## 2025-04-25 NOTE — ASSESSMENT & PLAN NOTE
Chronic, at goal (stable), continue current treatment plan and lifestyle modifications recommended.  Labs reviewed

## 2025-04-25 NOTE — PROGRESS NOTES
Negative for dysphoric mood and suicidal ideas. The patient is not nervous/anxious.        Vitals:    04/25/25 1206   BP: 130/78   BP Site: Left Upper Arm   Patient Position: Sitting   BP Cuff Size: Medium Adult   Pulse: 79   Resp: 18   Temp: 97.5 °F (36.4 °C)   TempSrc: Temporal   SpO2: 98%   Weight: 91.4 kg (201 lb 6.4 oz)   Height: 1.651 m (5' 5\")       Physical Exam  Vitals reviewed.   Constitutional:       General: She is not in acute distress.     Appearance: Normal appearance. She is obese.   HENT:      Head: Normocephalic.      Right Ear: External ear normal.      Left Ear: External ear normal.      Nose: Nose normal. No congestion.      Mouth/Throat:      Mouth: Mucous membranes are moist.   Eyes:      Extraocular Movements: Extraocular movements intact.      Pupils: Pupils are equal, round, and reactive to light.   Cardiovascular:      Rate and Rhythm: Normal rate and regular rhythm.      Pulses: Normal pulses.      Heart sounds: Normal heart sounds. No murmur heard.     No gallop.   Pulmonary:      Effort: Pulmonary effort is normal.      Breath sounds: Normal breath sounds. No wheezing or rhonchi.   Abdominal:      General: Abdomen is flat.   Musculoskeletal:         General: No swelling. Normal range of motion.      Cervical back: Normal range of motion.      Right lower leg: No edema.      Left lower leg: No edema.   Skin:     General: Skin is warm.      Capillary Refill: Capillary refill takes less than 2 seconds.      Coloration: Skin is not jaundiced.      Findings: Lesion (small cyst to right inner forearm) present.   Neurological:      General: No focal deficit present.      Mental Status: She is alert and oriented to person, place, and time.      Motor: No weakness.   Psychiatric:         Mood and Affect: Mood normal.         Behavior: Behavior is cooperative.         Thought Content: Thought content normal.         Judgment: Judgment normal.                 An electronic signature was used to

## 2025-05-02 ENCOUNTER — TELEPHONE (OUTPATIENT)
Dept: CARDIOLOGY CLINIC | Age: 65
End: 2025-05-02

## 2025-05-02 DIAGNOSIS — I45.6 WPW (WOLFF-PARKINSON-WHITE SYNDROME): Primary | ICD-10-CM

## 2025-05-02 DIAGNOSIS — R00.2 PALPITATIONS: ICD-10-CM

## 2025-05-02 NOTE — TELEPHONE ENCOUNTER
Patient called to schedule a new patient appointment. PCP put in a referral for WPW (Alexi-Parkinson-White syndrome) with Dr. Lemus.    Spoke with LORAINE uRiz and she relayed this needs to be scheduled with EP and that patient should get 30 day monitor before following up with Dr. Gamez.    Patient scheduled for monitor placement 05/06 at 8:30 am and follow up with Dr. Gamez on 06/17 at 8:30 am.

## 2025-05-19 ENCOUNTER — OFFICE VISIT (OUTPATIENT)
Dept: FAMILY MEDICINE CLINIC | Age: 65
End: 2025-05-19
Payer: COMMERCIAL

## 2025-05-19 VITALS
WEIGHT: 189.2 LBS | HEART RATE: 62 BPM | HEIGHT: 65 IN | BODY MASS INDEX: 31.52 KG/M2 | TEMPERATURE: 97.8 F | SYSTOLIC BLOOD PRESSURE: 118 MMHG | OXYGEN SATURATION: 97 % | DIASTOLIC BLOOD PRESSURE: 80 MMHG

## 2025-05-19 DIAGNOSIS — J06.9 UPPER RESPIRATORY TRACT INFECTION, UNSPECIFIED TYPE: Primary | ICD-10-CM

## 2025-05-19 LAB
INFLUENZA A ANTIGEN, POC: NEGATIVE
INFLUENZA B ANTIGEN, POC: NEGATIVE
LOT EXPIRE DATE: NORMAL
LOT KIT NUMBER: NORMAL
S PYO AG THROAT QL: NORMAL
SARS-COV-2, POC: NORMAL
VALID INTERNAL CONTROL: NORMAL
VENDOR AND KIT NAME POC: NORMAL

## 2025-05-19 PROCEDURE — 99213 OFFICE O/P EST LOW 20 MIN: CPT

## 2025-05-19 PROCEDURE — 87880 STREP A ASSAY W/OPTIC: CPT

## 2025-05-19 PROCEDURE — 87428 SARSCOV & INF VIR A&B AG IA: CPT

## 2025-05-19 RX ORDER — ONDANSETRON 4 MG/1
4 TABLET, FILM COATED ORAL 3 TIMES DAILY PRN
Qty: 30 TABLET | Refills: 0 | Status: CANCELLED | OUTPATIENT
Start: 2025-05-19

## 2025-05-19 RX ORDER — DEXTROMETHORPHAN HYDROBROMIDE AND PROMETHAZINE HYDROCHLORIDE 15; 6.25 MG/5ML; MG/5ML
5 SYRUP ORAL 4 TIMES DAILY PRN
Qty: 115 ML | Refills: 0 | Status: SHIPPED | OUTPATIENT
Start: 2025-05-19

## 2025-05-19 ASSESSMENT — ENCOUNTER SYMPTOMS
VOMITING: 0
COUGH: 1
ABDOMINAL PAIN: 0
DIARRHEA: 0
SINUS PRESSURE: 0
BLOOD IN STOOL: 0
WHEEZING: 0
APNEA: 0
COLOR CHANGE: 0
TROUBLE SWALLOWING: 0
CONSTIPATION: 0
BACK PAIN: 0
SORE THROAT: 1
SHORTNESS OF BREATH: 0
NAUSEA: 1

## 2025-05-19 NOTE — PATIENT INSTRUCTIONS
Thank you for choosing [facility].    Please bring a current list of medications to every appointment.    Please contact your pharmacy for any prescription refill(s) that you are requesting.

## 2025-05-19 NOTE — ASSESSMENT & PLAN NOTE
Flu/covid/strep neg. She was exposed to strep, will treat for URI and coverage for strep. She does have many antibiotic allergies, hx allergy to keflex (rash). Will trial augmentin, discussed 6% cross sensitivity. Cough medicine PRN. Continue fluid intake, rest at home.

## 2025-05-19 NOTE — PROGRESS NOTES
Johana Magallon (:  1960) is a 65 y.o. female,Established patient, here for evaluation of the following chief complaint(s):  Cold Symptoms (Patient c/o headache, nausea, aches, dizziness, sore throat, non-productive cough.  Symptoms began yesterday.  Patient denies fevers.  Patient has taken OTC Migraine, Sinus and Allergy medications)      ASSESSMENT/PLAN:  1. Upper respiratory tract infection, unspecified type  Assessment & Plan:    Flu/covid/strep neg. She was exposed to strep, will treat for URI and coverage for strep. She does have many antibiotic allergies, hx allergy to keflex (rash). Will trial augmentin, discussed 6% cross sensitivity. Cough medicine PRN. Continue fluid intake, rest at home.  Orders:  -     POCT COVID-19 & Influenza A/B  -     promethazine-dextromethorphan (PROMETHAZINE-DM) 6.25-15 MG/5ML syrup; Take 5 mLs by mouth 4 times daily as needed for Cough, Disp-115 mL, R-0Normal  -     amoxicillin-clavulanate (AUGMENTIN) 875-125 MG per tablet; Take 1 tablet by mouth 2 times daily for 7 days, Disp-14 tablet, R-0Normal  -     POCT rapid strep A      Return if symptoms worsen or fail to improve.    SUBJECTIVE/OBJECTIVE:    Johana presents with concerns of headache, nausea, aches, dizziness, sore throat, non-productive cough for 2 days.  Patient denies fevers, chills, SOB.  Denies vomiting but is dry heaving  She has taken OTC Migraine, Sinus and Allergy medications without relief  Granddaughter had strep last week- exposed to this  Eating and drinking ok        Past Medical History:   Diagnosis Date    Allergic rhinitis     Anxiety disorder     Depression     HSV (herpes simplex virus) infection     IBS (irritable bowel syndrome)     SVT (supraventricular tachycardia)     Stewart-Parkinson-White syndrome        Current Outpatient Medications   Medication Sig Dispense Refill    promethazine-dextromethorphan (PROMETHAZINE-DM) 6.25-15 MG/5ML syrup Take 5 mLs by mouth 4 times daily as needed for

## 2025-06-12 ENCOUNTER — RESULTS FOLLOW-UP (OUTPATIENT)
Dept: CARDIOLOGY CLINIC | Age: 65
End: 2025-06-12

## 2025-06-23 NOTE — PROGRESS NOTES
Phenylephrine-guaiFENesin (GENEXA LA PO) Take by mouth daily    Norman Munoz MD   melatonin 3 MG TABS tablet Take 1 tablet by mouth nightly as needed    Norman Munoz MD   Calcium Carbonate-Vitamin D (CALTRATE 600+D PO) Take 1 tablet by mouth daily     Norman Munoz MD   Multiple Vitamins-Minerals (THERAPEUTIC MULTIVITAMIN-MINERALS) tablet Take 1 tablet by mouth daily    Norman Munoz MD   senna (SENOKOT) 8.6 MG tablet Take 1 tablet by mouth daily as needed    ProviderNorman MD       Past Medical History:   Diagnosis Date    Allergic rhinitis     Anxiety disorder     Depression     HSV (herpes simplex virus) infection     IBS (irritable bowel syndrome)     SVT (supraventricular tachycardia)     Stewart-Parkinson-White syndrome         Past Surgical History:   Procedure Laterality Date    APPENDECTOMY  07/05/1999    CATARACT REMOVAL WITH IMPLANT Left     CHOLECYSTECTOMY      COLONOSCOPY  03/2020    Dr. Mina Odom    COLONOSCOPY N/A 3/5/2020    COLORECTAL CANCER SCREENING, NOT HIGH RISK performed by Mina Odom MD at [facility] END[facility]    DENTAL SURGERY      FOOT SURGERY Right 4/23/2021    EXCISION OF LARGE GANGLION CYST RIGHT FOOT AND ANKLE performed by Uzair Teague DPM at [facility] OR    INCISIONAL HERNIA REPAIR  6-8-16    with mesh    OTHER SURGICAL HISTORY      bone spur removed from front of skull    PARTIAL HYSTERECTOMY (CERVIX NOT REMOVED)  07/16/1999    TONSILLECTOMY         Allergies   Allergen Reactions    Dye [Iodides] Anaphylaxis and Shortness Of Breath     With shellfish in hives    Shellfish-Derived Products Anaphylaxis    Shrimp (Diagnostic) Anaphylaxis    Bactrim [Sulfamethoxazole-Trimethoprim]      Blisters,skin burns    Paxil [Paroxetine] Other (See Comments)     hallucuinations    Pcn [Penicillins]      Has never taken but stays away from due to cephalexin and keflex    Cephalexin Rash    Clindamycin/Lincomycin Cross Reactors Rash    Nortriptyline

## 2025-06-24 ENCOUNTER — OFFICE VISIT (OUTPATIENT)
Dept: CARDIOLOGY CLINIC | Age: 65
End: 2025-06-24
Payer: COMMERCIAL

## 2025-06-24 VITALS
BODY MASS INDEX: 31.32 KG/M2 | OXYGEN SATURATION: 97 % | HEART RATE: 72 BPM | HEIGHT: 65 IN | WEIGHT: 188 LBS | DIASTOLIC BLOOD PRESSURE: 72 MMHG | SYSTOLIC BLOOD PRESSURE: 110 MMHG

## 2025-06-24 DIAGNOSIS — I45.6 WPW (WOLFF-PARKINSON-WHITE SYNDROME): ICD-10-CM

## 2025-06-24 DIAGNOSIS — R42 LIGHTHEADEDNESS: ICD-10-CM

## 2025-06-24 DIAGNOSIS — R07.9 CHEST PAIN, UNSPECIFIED TYPE: ICD-10-CM

## 2025-06-24 DIAGNOSIS — R00.0 TACHYCARDIA: ICD-10-CM

## 2025-06-24 DIAGNOSIS — R00.2 PALPITATIONS: Primary | ICD-10-CM

## 2025-06-24 PROCEDURE — 93000 ELECTROCARDIOGRAM COMPLETE: CPT | Performed by: INTERNAL MEDICINE

## 2025-06-24 PROCEDURE — 99204 OFFICE O/P NEW MOD 45 MIN: CPT | Performed by: INTERNAL MEDICINE

## 2025-07-09 RX ORDER — GABAPENTIN 300 MG/1
300 CAPSULE ORAL 3 TIMES DAILY
Qty: 90 CAPSULE | Refills: 0 | Status: SHIPPED | OUTPATIENT
Start: 2025-07-09 | End: 2025-08-08

## 2025-07-16 ASSESSMENT — PATIENT HEALTH QUESTIONNAIRE - PHQ9
5. POOR APPETITE OR OVEREATING: SEVERAL DAYS
10. IF YOU CHECKED OFF ANY PROBLEMS, HOW DIFFICULT HAVE THESE PROBLEMS MADE IT FOR YOU TO DO YOUR WORK, TAKE CARE OF THINGS AT HOME, OR GET ALONG WITH OTHER PEOPLE: NOT DIFFICULT AT ALL
6. FEELING BAD ABOUT YOURSELF - OR THAT YOU ARE A FAILURE OR HAVE LET YOURSELF OR YOUR FAMILY DOWN: NOT AT ALL
6. FEELING BAD ABOUT YOURSELF - OR THAT YOU ARE A FAILURE OR HAVE LET YOURSELF OR YOUR FAMILY DOWN: NOT AT ALL
4. FEELING TIRED OR HAVING LITTLE ENERGY: SEVERAL DAYS
4. FEELING TIRED OR HAVING LITTLE ENERGY: SEVERAL DAYS
1. LITTLE INTEREST OR PLEASURE IN DOING THINGS: SEVERAL DAYS
8. MOVING OR SPEAKING SO SLOWLY THAT OTHER PEOPLE COULD HAVE NOTICED. OR THE OPPOSITE - BEING SO FIDGETY OR RESTLESS THAT YOU HAVE BEEN MOVING AROUND A LOT MORE THAN USUAL: SEVERAL DAYS
3. TROUBLE FALLING OR STAYING ASLEEP: SEVERAL DAYS
3. TROUBLE FALLING OR STAYING ASLEEP: SEVERAL DAYS
9. THOUGHTS THAT YOU WOULD BE BETTER OFF DEAD, OR OF HURTING YOURSELF: NOT AT ALL
2. FEELING DOWN, DEPRESSED OR HOPELESS: NOT AT ALL
10. IF YOU CHECKED OFF ANY PROBLEMS, HOW DIFFICULT HAVE THESE PROBLEMS MADE IT FOR YOU TO DO YOUR WORK, TAKE CARE OF THINGS AT HOME, OR GET ALONG WITH OTHER PEOPLE: NOT DIFFICULT AT ALL
2. FEELING DOWN, DEPRESSED OR HOPELESS: NOT AT ALL
SUM OF ALL RESPONSES TO PHQ QUESTIONS 1-9: 5
1. LITTLE INTEREST OR PLEASURE IN DOING THINGS: SEVERAL DAYS
8. MOVING OR SPEAKING SO SLOWLY THAT OTHER PEOPLE COULD HAVE NOTICED. OR THE OPPOSITE, BEING SO FIGETY OR RESTLESS THAT YOU HAVE BEEN MOVING AROUND A LOT MORE THAN USUAL: SEVERAL DAYS
SUM OF ALL RESPONSES TO PHQ QUESTIONS 1-9: 5
5. POOR APPETITE OR OVEREATING: SEVERAL DAYS
9. THOUGHTS THAT YOU WOULD BE BETTER OFF DEAD, OR OF HURTING YOURSELF: NOT AT ALL
SUM OF ALL RESPONSES TO PHQ QUESTIONS 1-9: 5
7. TROUBLE CONCENTRATING ON THINGS, SUCH AS READING THE NEWSPAPER OR WATCHING TELEVISION: NOT AT ALL
SUM OF ALL RESPONSES TO PHQ QUESTIONS 1-9: 5
SUM OF ALL RESPONSES TO PHQ QUESTIONS 1-9: 5
7. TROUBLE CONCENTRATING ON THINGS, SUCH AS READING THE NEWSPAPER OR WATCHING TELEVISION: NOT AT ALL

## 2025-07-16 ASSESSMENT — ANXIETY QUESTIONNAIRES
2. NOT BEING ABLE TO STOP OR CONTROL WORRYING: SEVERAL DAYS
IF YOU CHECKED OFF ANY PROBLEMS ON THIS QUESTIONNAIRE, HOW DIFFICULT HAVE THESE PROBLEMS MADE IT FOR YOU TO DO YOUR WORK, TAKE CARE OF THINGS AT HOME, OR GET ALONG WITH OTHER PEOPLE: NOT DIFFICULT AT ALL
4. TROUBLE RELAXING: SEVERAL DAYS
7. FEELING AFRAID AS IF SOMETHING AWFUL MIGHT HAPPEN: NOT AT ALL
5. BEING SO RESTLESS THAT IT IS HARD TO SIT STILL: NOT AT ALL
6. BECOMING EASILY ANNOYED OR IRRITABLE: SEVERAL DAYS
3. WORRYING TOO MUCH ABOUT DIFFERENT THINGS: SEVERAL DAYS
3. WORRYING TOO MUCH ABOUT DIFFERENT THINGS: SEVERAL DAYS
5. BEING SO RESTLESS THAT IT IS HARD TO SIT STILL: NOT AT ALL
IF YOU CHECKED OFF ANY PROBLEMS ON THIS QUESTIONNAIRE, HOW DIFFICULT HAVE THESE PROBLEMS MADE IT FOR YOU TO DO YOUR WORK, TAKE CARE OF THINGS AT HOME, OR GET ALONG WITH OTHER PEOPLE: NOT DIFFICULT AT ALL
2. NOT BEING ABLE TO STOP OR CONTROL WORRYING: SEVERAL DAYS
1. FEELING NERVOUS, ANXIOUS, OR ON EDGE: SEVERAL DAYS
6. BECOMING EASILY ANNOYED OR IRRITABLE: SEVERAL DAYS
7. FEELING AFRAID AS IF SOMETHING AWFUL MIGHT HAPPEN: NOT AT ALL
1. FEELING NERVOUS, ANXIOUS, OR ON EDGE: SEVERAL DAYS
GAD7 TOTAL SCORE: 5
4. TROUBLE RELAXING: SEVERAL DAYS

## 2025-07-18 ENCOUNTER — OFFICE VISIT (OUTPATIENT)
Dept: PSYCHIATRY | Age: 65
End: 2025-07-18
Payer: COMMERCIAL

## 2025-07-18 VITALS
WEIGHT: 189 LBS | SYSTOLIC BLOOD PRESSURE: 118 MMHG | HEIGHT: 65 IN | DIASTOLIC BLOOD PRESSURE: 76 MMHG | BODY MASS INDEX: 31.49 KG/M2

## 2025-07-18 DIAGNOSIS — F33.1 MDD (MAJOR DEPRESSIVE DISORDER), RECURRENT EPISODE, MODERATE (HCC): ICD-10-CM

## 2025-07-18 DIAGNOSIS — F90.0 ADHD (ATTENTION DEFICIT HYPERACTIVITY DISORDER), INATTENTIVE TYPE: ICD-10-CM

## 2025-07-18 DIAGNOSIS — F33.2 SEVERE EPISODE OF RECURRENT MAJOR DEPRESSIVE DISORDER, WITHOUT PSYCHOTIC FEATURES (HCC): ICD-10-CM

## 2025-07-18 DIAGNOSIS — F41.1 GAD (GENERALIZED ANXIETY DISORDER): Primary | ICD-10-CM

## 2025-07-18 DIAGNOSIS — G47.9 SLEEP DIFFICULTIES: ICD-10-CM

## 2025-07-18 PROCEDURE — 99214 OFFICE O/P EST MOD 30 MIN: CPT | Performed by: NURSE PRACTITIONER

## 2025-07-18 RX ORDER — ESCITALOPRAM OXALATE 5 MG/1
5 TABLET ORAL DAILY
Qty: 90 TABLET | Refills: 0 | Status: SHIPPED | OUTPATIENT
Start: 2025-07-18 | End: 2025-10-16

## 2025-07-18 RX ORDER — DEXTROAMPHETAMINE SACCHARATE, AMPHETAMINE ASPARTATE, DEXTROAMPHETAMINE SULFATE AND AMPHETAMINE SULFATE 1.25; 1.25; 1.25; 1.25 MG/1; MG/1; MG/1; MG/1
5 TABLET ORAL 2 TIMES DAILY
Qty: 60 TABLET | Refills: 0 | Status: SHIPPED | OUTPATIENT
Start: 2025-07-18 | End: 2025-08-17

## 2025-07-18 RX ORDER — DEXTROAMPHETAMINE SACCHARATE, AMPHETAMINE ASPARTATE MONOHYDRATE, DEXTROAMPHETAMINE SULFATE AND AMPHETAMINE SULFATE 6.25; 6.25; 6.25; 6.25 MG/1; MG/1; MG/1; MG/1
25 CAPSULE, EXTENDED RELEASE ORAL DAILY
Qty: 30 CAPSULE | Refills: 0 | Status: SHIPPED | OUTPATIENT
Start: 2025-09-04 | End: 2025-10-04

## 2025-07-18 RX ORDER — QUETIAPINE FUMARATE 25 MG/1
TABLET, FILM COATED ORAL
Qty: 60 TABLET | Refills: 3 | Status: SHIPPED | OUTPATIENT
Start: 2025-07-18

## 2025-07-18 RX ORDER — DEXTROAMPHETAMINE SACCHARATE, AMPHETAMINE ASPARTATE MONOHYDRATE, DEXTROAMPHETAMINE SULFATE AND AMPHETAMINE SULFATE 6.25; 6.25; 6.25; 6.25 MG/1; MG/1; MG/1; MG/1
25 CAPSULE, EXTENDED RELEASE ORAL DAILY
Qty: 30 CAPSULE | Refills: 0 | Status: SHIPPED | OUTPATIENT
Start: 2025-08-06 | End: 2025-09-05

## 2025-07-18 RX ORDER — HYDROXYZINE HYDROCHLORIDE 10 MG/1
10 TABLET, FILM COATED ORAL 3 TIMES DAILY PRN
Qty: 90 TABLET | Refills: 0 | Status: SHIPPED | OUTPATIENT
Start: 2025-07-18

## 2025-07-18 RX ORDER — DEXTROAMPHETAMINE SACCHARATE, AMPHETAMINE ASPARTATE MONOHYDRATE, DEXTROAMPHETAMINE SULFATE AND AMPHETAMINE SULFATE 6.25; 6.25; 6.25; 6.25 MG/1; MG/1; MG/1; MG/1
25 CAPSULE, EXTENDED RELEASE ORAL DAILY
Qty: 30 CAPSULE | Refills: 0 | Status: SHIPPED | OUTPATIENT
Start: 2025-10-03 | End: 2025-11-02

## 2025-07-18 ASSESSMENT — ANXIETY QUESTIONNAIRES
4. TROUBLE RELAXING: SEVERAL DAYS
1. FEELING NERVOUS, ANXIOUS, OR ON EDGE: SEVERAL DAYS
5. BEING SO RESTLESS THAT IT IS HARD TO SIT STILL: NOT AT ALL
6. BECOMING EASILY ANNOYED OR IRRITABLE: SEVERAL DAYS
GAD7 TOTAL SCORE: 4
IF YOU CHECKED OFF ANY PROBLEMS ON THIS QUESTIONNAIRE, HOW DIFFICULT HAVE THESE PROBLEMS MADE IT FOR YOU TO DO YOUR WORK, TAKE CARE OF THINGS AT HOME, OR GET ALONG WITH OTHER PEOPLE: SOMEWHAT DIFFICULT
2. NOT BEING ABLE TO STOP OR CONTROL WORRYING: SEVERAL DAYS
3. WORRYING TOO MUCH ABOUT DIFFERENT THINGS: NOT AT ALL
7. FEELING AFRAID AS IF SOMETHING AWFUL MIGHT HAPPEN: NOT AT ALL

## 2025-07-18 ASSESSMENT — PATIENT HEALTH QUESTIONNAIRE - PHQ9
10. IF YOU CHECKED OFF ANY PROBLEMS, HOW DIFFICULT HAVE THESE PROBLEMS MADE IT FOR YOU TO DO YOUR WORK, TAKE CARE OF THINGS AT HOME, OR GET ALONG WITH OTHER PEOPLE: SOMEWHAT DIFFICULT
SUM OF ALL RESPONSES TO PHQ QUESTIONS 1-9: 5
9. THOUGHTS THAT YOU WOULD BE BETTER OFF DEAD, OR OF HURTING YOURSELF: NOT AT ALL
4. FEELING TIRED OR HAVING LITTLE ENERGY: SEVERAL DAYS
5. POOR APPETITE OR OVEREATING: SEVERAL DAYS
6. FEELING BAD ABOUT YOURSELF - OR THAT YOU ARE A FAILURE OR HAVE LET YOURSELF OR YOUR FAMILY DOWN: NOT AT ALL
3. TROUBLE FALLING OR STAYING ASLEEP: SEVERAL DAYS
SUM OF ALL RESPONSES TO PHQ QUESTIONS 1-9: 5
SUM OF ALL RESPONSES TO PHQ QUESTIONS 1-9: 5
2. FEELING DOWN, DEPRESSED OR HOPELESS: NOT AT ALL
8. MOVING OR SPEAKING SO SLOWLY THAT OTHER PEOPLE COULD HAVE NOTICED. OR THE OPPOSITE, BEING SO FIGETY OR RESTLESS THAT YOU HAVE BEEN MOVING AROUND A LOT MORE THAN USUAL: SEVERAL DAYS
1. LITTLE INTEREST OR PLEASURE IN DOING THINGS: SEVERAL DAYS
SUM OF ALL RESPONSES TO PHQ QUESTIONS 1-9: 5
7. TROUBLE CONCENTRATING ON THINGS, SUCH AS READING THE NEWSPAPER OR WATCHING TELEVISION: NOT AT ALL

## 2025-07-18 NOTE — PROGRESS NOTES
PSYCHIATRY PROGRESS NOTE    Johana Magallon  1960 07/18/25    CC:   Chief Complaint   Patient presents with    Follow-up     Referred by Albania Singh APRN - NP.      Diagnosis Orders   1. LUIZ (generalized anxiety disorder)  QUEtiapine (SEROQUEL) 25 MG tablet    hydrOXYzine HCl (ATARAX) 10 MG tablet      2. MDD (major depressive disorder), recurrent episode, moderate (HCC)  escitalopram (LEXAPRO) 5 MG tablet      3. ADHD (attention deficit hyperactivity disorder), inattentive type  amphetamine-dextroamphetamine (ADDERALL XR) 25 MG extended release capsule    amphetamine-dextroamphetamine (ADDERALL XR) 25 MG extended release capsule    amphetamine-dextroamphetamine (ADDERALL XR) 25 MG extended release capsule    amphetamine-dextroamphetamine (ADDERALL, 5MG,) 5 MG tablet      4. Severe episode of recurrent major depressive disorder, without psychotic features (HCC)  QUEtiapine (SEROQUEL) 25 MG tablet      5. Sleep difficulties  QUEtiapine (SEROQUEL) 25 MG tablet            Assessment & Plan:     Psychiatric Assessment  Patient presentation today indicative of Depression, Anxiety, ADHD. Since last evaluation, patient continues to display improvements of depression and anxiety. Likely due to additional Lexapro along with progression of her daughters cancer diagnosis.     2. Pharmacology     - Continue Seroquel 25 mg 1-2 tablets qhs. Patient has been prescribed Quetiapine. Discussed with patient risks, benefits, side effects & adverse events of medication, including but not limited to orthostasis/HTN, drowsiness, headache, agitation, dizziness, fatigue, EPS, weight gain, metabolic syndrome, dry mouth, increased appetite, constipation, and suicidal ideation.  Also discussed need for LFT, EKG, lipid monitoring. Patient verbalized understanding. Will monitor for AE/SEs at each appointment.      - Continue Hydroxyzine 10 mg TID PRN. Patient has been prescribed an antihistamine (Diphenhydramine, Hydroxyzine,

## 2025-09-02 DIAGNOSIS — F33.1 MDD (MAJOR DEPRESSIVE DISORDER), RECURRENT EPISODE, MODERATE (HCC): ICD-10-CM

## 2025-09-02 RX ORDER — ESCITALOPRAM OXALATE 5 MG/1
5 TABLET ORAL DAILY
Qty: 30 TABLET | Refills: 2 | Status: SHIPPED | OUTPATIENT
Start: 2025-09-02 | End: 2025-12-01

## (undated) DEVICE — SYRINGE MED 3ML CLR PLAS STD N CTRL LUERLOCK TIP DISP

## (undated) DEVICE — BANDAGE COMPR W4INXL12FT E DISP ESMARCH EVEN

## (undated) DEVICE — PENCIL ES L3M BTTN SWCH S STL HEX LOK BLDE ELECTRD HOLSTER

## (undated) DEVICE — STATION ISOLATN W1775XH205IN D675IN ICU WALL OR GLS MT P2

## (undated) DEVICE — GLOVE SURG SZ 8 CRM LTX FREE POLYISOPRENE POLYMER BEAD ANTI

## (undated) DEVICE — STRIP,CLOSURE,WOUND,MEDI-STRIP,1/2X4: Brand: MEDLINE

## (undated) DEVICE — PODIATRY: Brand: MEDLINE INDUSTRIES, INC.

## (undated) DEVICE — SUTURE MCRYL SZ 4-0 L18IN ABSRB UD L19MM PS-2 3/8 CIR PRIM Y496G

## (undated) DEVICE — 3M™ STERI-STRIP™ COMPOUND BENZOIN TINCTURE 40 BAGS/CARTON 4 CARTONS/CASE C1544: Brand: 3M™ STERI-STRIP™

## (undated) DEVICE — SYRINGE IRRIG 60ML SFT PLIABLE BLB EZ TO GRP 1 HND USE W/

## (undated) DEVICE — SOLUTION IV IRRIG POUR BRL 0.9% SODIUM CHL 2F7124

## (undated) DEVICE — DRESSING,GAUZE,XEROFORM,CURAD,1"X8",ST: Brand: CURAD

## (undated) DEVICE — GLOVE SURG SZ 75 CRM LTX FREE POLYISOPRENE POLYMER BEAD ANTI

## (undated) DEVICE — SUTURE VCRL SZ 3-0 L27IN ABSRB UD L19MM PS-2 3/8 CIR PRIM J427H

## (undated) DEVICE — 3M™ COBAN™ NL STERILE NON-LATEX SELF-ADHERENT WRAP, 2083S, 3 IN X 5 YD (7,5 CM X 4,5 M), 24 ROLLS/CASE: Brand: 3M™ COBAN™

## (undated) DEVICE — CANISTER, RIGID, 1200CC: Brand: MEDLINE INDUSTRIES, INC.

## (undated) DEVICE — GOWN,SIRUS,POLYRNF,BRTHSLV,XL,30/CS: Brand: MEDLINE

## (undated) DEVICE — SYRINGE 20ML LL S/C 50

## (undated) DEVICE — TUBING, SUCTION, 1/4" X 12', STRAIGHT: Brand: MEDLINE

## (undated) DEVICE — BANDAGE,GAUZE,CONFORMING,3"X75",STRL,LF: Brand: MEDLINE

## (undated) DEVICE — BANDAGE,GAUZE,BULKEE II,4.5"X4.1YD,STRL: Brand: MEDLINE

## (undated) DEVICE — SUTURE VCRL SZ 4-0 L18IN ABSRB UD L19MM PS-2 3/8 CIR PRIM J496H